# Patient Record
Sex: MALE | Race: WHITE | Employment: OTHER | URBAN - METROPOLITAN AREA
[De-identification: names, ages, dates, MRNs, and addresses within clinical notes are randomized per-mention and may not be internally consistent; named-entity substitution may affect disease eponyms.]

---

## 2018-01-26 ENCOUNTER — HOSPITAL ENCOUNTER (OUTPATIENT)
Dept: PHYSICAL THERAPY | Age: 73
Discharge: HOME | End: 2018-01-26
Payer: MEDICARE

## 2018-01-26 PROCEDURE — 97162 PT EVAL MOD COMPLEX 30 MIN: CPT

## 2018-01-26 PROCEDURE — G8979 MOBILITY GOAL STATUS: HCPCS

## 2018-01-26 PROCEDURE — 97110 THERAPEUTIC EXERCISES: CPT

## 2018-01-26 PROCEDURE — G8978 MOBILITY CURRENT STATUS: HCPCS

## 2018-01-26 NOTE — PROGRESS NOTES
Ambulatory/Rehab Services H2 Model Falls Risk Assessment    Risk Factor Pts. ·   Confusion/Disorientation/Impulsivity  []    4 ·   Symptomatic Depression  []   2 ·   Altered Elimination  []   1 ·   Dizziness/Vertigo  []   1 ·   Gender (Male)  [x]   1 ·   Any administered antiepileptics (anticonvulsants):  []   2 ·   Any administered benzodiazepines:  []   1 ·   Visual Impairment (specify):  []   1 ·   Portable Oxygen Use  []   1 ·   Orthostatic ? BP  []   1 ·   History of Recent Falls (within 3 mos.)  []   5     Ability to Rise from Chair (choose one) Pts. ·   Ability to rise in a single movement  []   0 ·   Pushes up, successful in one attempt  [x]   1 ·   Multiple attempts, but successful  []   3 ·   Unable to rise without assistance  []   4   Total: (5 or greater = High Risk) 2     Falls Prevention Plan:   []                Physical Limitations to Exercise (specify):   []                Mobility Assistance Device (type):   []                Exercise/Equipment Adaptation (specify):    ©2010 [facility] [address] Patent #[id number].  Federal Law prohibits the replication, distribution or use without written permission from [facility] [employer]

## 2018-01-26 NOTE — THERAPY EVALUATION
Mavis Capellan  : 1945  [place] / [place] / Product Type: Medicare /  [address]  at [place] [address] Therapy  [address], [address]  [phone]   [phone]      OUTPATIENT PHYSICAL THERAPY:Initial Assessment 2018    ICD-10: Treatment Diagnosis:  R26.2 Difficulty in walking, not elsewhere classified     M54.5 Low back pain   Precautions/Allergies:   Sulfa (sulfonamide antibiotics)   Fall Risk Score: 2 (? 5 = High Risk)  MD Orders: Evaluate and treat MEDICAL/REFERRING DIAGNOSIS:  Pain in right hip [M25.551]  Other intervertebral disc degeneration, lumbar region [M51.36]   DATE OF ONSET: 2017  REFERRING PHYSICIAN: Jack Walsh, *  RETURN PHYSICIAN APPOINTMENT: TBD     INITIAL ASSESSMENT:  Mr. Louis Zamora reported an onset of lumbar pain and R hip pain / weakness beginning 2017. He is an avid golfer and his symptoms are causing weight bearing activities to be increasingly difficult. Further evaluation revealed R hip abductor weakness which contributes to his R LE limp, and may be affecting his lumbar pain. He has been referred to physical therapy for treatment. PROBLEM LIST (Impacting functional limitations):  1. Decreased Strength  2. Decreased ADL/Functional Activities  3. Increased Pain  4. Decreased Activity Tolerance INTERVENTIONS PLANNED:  1. Home Exercise Program (HEP)  2. Manual Therapy  3. Neuromuscular Re-education/Strengthening  4. Therapeutic Exercise/Strengthening   TREATMENT PLAN:  Effective Dates: 2018 TO 3/26/2018. Frequency/Duration: 2 times a week for 8 weeks  GOALS: (Goals have been discussed and agreed upon with patient.)  SHORT-TERM FUNCTIONAL GOALS: Time Frame: 4 weeks  1. Increase LEFS 4 points to decrease pain 1-2 levels. 2. Increase R LE strength to decrease pain 1-2 levels. 3. Increase R LE strength to improve gait and decreased R LE limp with golf.   DISCHARGE GOALS: Time Frame: 12 weeks  1. Increase LEFS 9 points to decrease pain +2 levels. 2. Increase R LE strength to allow a normal gait pattern and no R LE limp with golf. .  3. Demonstrate independence in home exercises to allow DC from physical therapy. Rehabilitation Potential For Stated Goals: Good  Regarding Marquez Siddiqui's therapy, I certify that the treatment plan above will be carried out by a therapist or under their direction. Thank you for this referral,  Renan Caballero., PT     Referring Physician Signature: Néstor Farias., *              Date                    The information in this section was collected on 1/26/2018 (except where otherwise noted). HISTORY:   History of Present Injury/Illness (Reason for Referral): The patient reported an onset of lumbar pain and R hip pain / weakness beginning June 2017. He is an avid golfer and his symptoms are causing weight bearing activities to be increasingly difficult. Further evaluation revealed R hip abductor weakness which contributes to his R LE limp, and may be affecting his lumbar pain. He has been referred to physical therapy for treatment. Past Medical History/Comorbidities:   Mr. Jose D Merino  has a past medical history of Abdominal aneurysm without mention of rupture (9/30/2015); Benign localized hyperplasia of prostate without urinary obstruction and other lower urinary tract symptoms (LUTS) (9/30/2015); Bladder neck obstruction (9/30/2015); Cancer ([facility] [address] (02/2017); Gastrointestinal disorder; H/O testicular mass (9/30/2015); Hypertension; Inguinal hernia with obstruction, without mention of gangrene, recurrent unilateral or unspecified (9/30/2015); Other and unspecified hyperlipidemia (9/30/2015); Other ill-defined conditions; and Renal insufficiency (9/30/2015). Mr. Jose D Merino  has a past surgical history that includes hx other surgical; hx orthopaedic (Right); hx orthopaedic (Right); and hx other surgical (Right, 02/2017).   Social History/Living Environment:      Prior Level of Function/Work/Activity:  Retired  Dominant Side:         RIGHT  Current Medications:       Current Outpatient Prescriptions:     amLODIPine (NORVASC) 2.5 mg tablet, Take 7.5 mg by mouth daily. , Disp: , Rfl:     atorvastatin (LIPITOR) 40 mg tablet, Take  by mouth daily. 1-1/2 tablet daily, Disp: , Rfl:     finasteride (PROSCAR) 5 mg tablet, Take 5 mg by mouth daily. , Disp: , Rfl:     Aspirin, Buffered 81 mg tab, Take 1 Tab by mouth daily. Takes every other day, Disp: , Rfl:     tamsulosin (FLOMAX) 0.4 mg capsule, Take 0.4 mg by mouth daily. , Disp: , Rfl:     omeprazole (PRILOSEC) 10 mg capsule, Take 10 mg by mouth daily. , Disp: , Rfl:    Date Last Reviewed:  1/26/2018   Number of Personal Factors/Comorbidities that affect the Plan of Care: 1-2: MODERATE COMPLEXITY   EXAMINATION:     Observation/Orthostatic Postural Assessment:   Slightly kyphotic with a R LE limp. Palpation:   Tenderness to her lumbar spine and lateral right hip. ROM: AROM : WNL                          Strength:    -4/5 R LE Strength      5/5 L LE Strength           Special Tests: N/A  Neurological Screen: N/A  Functional Mobility: Independent   Balance:  Good. Body Structures Involved:  1. Joints  2. Muscles Body Functions Affected:  1. Sensory/Pain  2. Neuromusculoskeletal  3. Movement Related Activities and Participation Affected:  1. General Tasks and Demands  2. Mobility  3. Community, Social and [place]   Number of elements (examined above) that affect the Plan of Care: 3: MODERATE COMPLEXITY   CLINICAL PRESENTATION:   Presentation: Evolving clinical presentation with changing clinical characteristics: MODERATE COMPLEXITY   CLINICAL DECISION MAKING:   Outcome Measure:    Tool Used: Lower Extremity Functional Scale (LEFS)  Score:  Initial: 39/80 Most Recent: X/80 (Date: -- )   Interpretation of Score: 20 questions each scored on a 5 point scale with 0 representing \"extreme difficulty or unable to perform\" and 4 representing \"no difficulty\". The lower the score, the greater the functional disability. 80/80 represents no disability. Minimal detectable change is 9 points. Score 80 79-63 62-48 47-32 31-16 15-1 0   Modifier CH CI CJ CK CL CM CN     ? Mobility - Walking and Moving Around:     - CURRENT STATUS: CK - 40%-59% impaired, limited or restricted    - GOAL STATUS: CJ - 20%-39% impaired, limited or restricted    - D/C STATUS:  ---------------To be determined---------------    Medical Necessity:   · Patient demonstrates good rehab potential due to higher previous functional level. Reason for Services/Other Comments:  · Patient continues to require skilled intervention due to his inability to perform weight bearing activities and ADLs due to R LE pain and weakness. .   Use of outcome tool(s) and clinical judgement create a POC that gives a: Questionable prediction of patient's progress: MODERATE COMPLEXITY            TREATMENT:   (In addition to Assessment/Re-Assessment sessions the following treatments were rendered)  Pre-treatment Symptoms/Complaints:  The patient reported an onset of lumbar pain and R hip pain / weakness beginning June 2017. He is an avid golfer and his symptoms are causing weight bearing activities to be increasingly difficult. Further evaluation revealed R hip abductor weakness which contributes to his R LE limp, and may be affecting his lumbar pain. He has been referred to physical therapy for treatment. Pain: Initial:   Pain Intensity 1: 6  Post Session:  6   Therapeutic Exercise: ( 15): The patient received treatment as below to improve mobility, strength and balance. Required moderate verbal and manual cues to promote proper body alignment, promote proper body posture and promote proper body mechanics. Progressed resistance, range and repetitions as indicated.   The patient received exercise instruction followed by patient demonstration of the exercises to include AROM, PROM, and strengthening exercises for  step downs, gluteus medius, hip abduction, lateral plank, crab walk, bridging, single hip and knee extension in supine, and heel cord stretching to stabilize the patient's LE and improve AROM to decrease spasms, pain, and improve function. [facility]  Evaluation: ( X )  Manual Therapy (     ):   Therapeutic Modalities:                                                                                               HEP: As above; handouts given to patient for all exercises. Treatment/Session Assessment:    · Response to Treatment:  The patient tolerated today's treatment without symptom exacerbation. The patient demonstrated independence with the initial home exercises and is to perform the exercises in conjunction with continued physical therapy. The patient should progress to an independent home exercise program within a few weeks. · Compliance with Program/Exercises: Will assess as treatment progresses. · Recommendations/Intent for next treatment session: \"Next visit will focus on advancements to more challenging activities\".    Total Treatment Duration:  PT Patient Time In/Time Out  Time In: 0900  Time Out: [address] T[address] StreetPancho, PT

## 2018-01-30 ENCOUNTER — HOSPITAL ENCOUNTER (OUTPATIENT)
Dept: PHYSICAL THERAPY | Age: 73
Discharge: HOME | End: 2018-01-30
Payer: MEDICARE

## 2018-01-30 PROCEDURE — 97110 THERAPEUTIC EXERCISES: CPT

## 2018-02-02 ENCOUNTER — HOSPITAL ENCOUNTER (OUTPATIENT)
Dept: PHYSICAL THERAPY | Age: 73
Discharge: HOME | End: 2018-02-02
Payer: MEDICARE

## 2018-02-02 PROCEDURE — 97110 THERAPEUTIC EXERCISES: CPT

## 2018-02-02 NOTE — THERAPY EVALUATION
Mavis Capellan  : 1945  [place] / Product Type: Medicare /  [address]  at [facility]  [address], [address]  [phone]   [phone]      OUTPATIENT PHYSICAL THERAPY:Daily Note 2018    ICD-10: Treatment Diagnosis:  R26.2 Difficulty in walking, not elsewhere classified     M54.5 Low back pain   Precautions/Allergies:   Sulfa (sulfonamide antibiotics)   Fall Risk Score: 2 (? 5 = High Risk)  MD Orders: Evaluate and treat MEDICAL/REFERRING DIAGNOSIS:  Pain in right hip [M25.551]  Other intervertebral disc degeneration, lumbar region [M51.36]   DATE OF ONSET: 2017  REFERRING PHYSICIAN: Jack San., *  RETURN PHYSICIAN APPOINTMENT: TBD     INITIAL ASSESSMENT:  Mr. Louis Zamora reported an onset of lumbar pain and R hip pain / weakness beginning 2017. He is an avid golfer and his symptoms are causing weight bearing activities to be increasingly difficult. Further evaluation revealed R hip abductor weakness which contributes to his R LE limp, and may be affecting his lumbar pain. He has been referred to physical therapy for treatment. PROBLEM LIST (Impacting functional limitations):  1. Decreased Strength  2. Decreased ADL/Functional Activities  3. Increased Pain  4. Decreased Activity Tolerance INTERVENTIONS PLANNED:  1. Home Exercise Program (HEP)  2. Manual Therapy  3. Neuromuscular Re-education/Strengthening  4. Therapeutic Exercise/Strengthening   TREATMENT PLAN:  Effective Dates: 2018 TO 3/26/2018. Frequency/Duration: 2 times a week for 8 weeks  GOALS: (Goals have been discussed and agreed upon with patient.)  SHORT-TERM FUNCTIONAL GOALS: Time Frame: 4 weeks  1. Increase LEFS 4 points to decrease pain 1-2 levels. 2. Increase R LE strength to decrease pain 1-2 levels. 3. Increase R LE strength to improve gait and decreased R LE limp with golf.   DISCHARGE GOALS: Time Frame: 12 weeks  1. Increase LEFS 9 points to decrease pain +2 levels. 2. Increase R LE strength to allow a normal gait pattern and no R LE limp with golf. .  3. Demonstrate independence in home exercises to allow DC from physical therapy. Rehabilitation Potential For Stated Goals: Good  Regarding Marquez Siddiqui's therapy, I certify that the treatment plan above will be carried out by a therapist or under their direction. Thank you for this referral,  Keli Clement., PT                 The information in this section was collected on 1/26/2018 (except where otherwise noted). HISTORY:   History of Present Injury/Illness (Reason for Referral): The patient reported an onset of lumbar pain and R hip pain / weakness beginning June 2017. He is an avid golfer and his symptoms are causing weight bearing activities to be increasingly difficult. Further evaluation revealed R hip abductor weakness which contributes to his R LE limp, and may be affecting his lumbar pain. He has been referred to physical therapy for treatment. Past Medical History/Comorbidities:   Mr. Antonio Mead  has a past medical history of Abdominal aneurysm without mention of rupture (9/30/2015); Benign localized hyperplasia of prostate without urinary obstruction and other lower urinary tract symptoms (LUTS) (9/30/2015); Bladder neck obstruction (9/30/2015); Cancer ([facility] [address] (02/2017); Gastrointestinal disorder; H/O testicular mass (9/30/2015); Hypertension; Inguinal hernia with obstruction, without mention of gangrene, recurrent unilateral or unspecified (9/30/2015); Other and unspecified hyperlipidemia (9/30/2015); Other ill-defined conditions; and Renal insufficiency (9/30/2015). Mr. Antonio Mead  has a past surgical history that includes hx other surgical; hx orthopaedic (Right); hx orthopaedic (Right); and hx other surgical (Right, 02/2017).   Social History/Living Environment:      Prior Level of Function/Work/Activity:  Retired  Dominant Side: RIGHT  Current Medications:       Current Outpatient Prescriptions:     amLODIPine (NORVASC) 2.5 mg tablet, Take 7.5 mg by mouth daily. , Disp: , Rfl:     atorvastatin (LIPITOR) 40 mg tablet, Take  by mouth daily. 1-1/2 tablet daily, Disp: , Rfl:     finasteride (PROSCAR) 5 mg tablet, Take 5 mg by mouth daily. , Disp: , Rfl:     Aspirin, Buffered 81 mg tab, Take 1 Tab by mouth daily. Takes every other day, Disp: , Rfl:     tamsulosin (FLOMAX) 0.4 mg capsule, Take 0.4 mg by mouth daily. , Disp: , Rfl:     omeprazole (PRILOSEC) 10 mg capsule, Take 10 mg by mouth daily. , Disp: , Rfl:    Date Last Reviewed:  2/2/2018   Number of Personal Factors/Comorbidities that affect the Plan of Care: 1-2: MODERATE COMPLEXITY   EXAMINATION:     Observation/Orthostatic Postural Assessment:   Slightly kyphotic with a R LE limp. Palpation:   Tenderness to her lumbar spine and lateral right hip. ROM: AROM : WNL                          Strength:    -4/5 R LE Strength      5/5 L LE Strength           Special Tests: N/A  Neurological Screen: N/A  Functional Mobility: Independent   Balance:  Good. Body Structures Involved:  1. Joints  2. Muscles Body Functions Affected:  1. Sensory/Pain  2. Neuromusculoskeletal  3. Movement Related Activities and Participation Affected:  1. General Tasks and Demands  2. Mobility  3. Community, Social and [place]   Number of elements (examined above) that affect the Plan of Care: 3: MODERATE COMPLEXITY   CLINICAL PRESENTATION:   Presentation: Evolving clinical presentation with changing clinical characteristics: MODERATE COMPLEXITY   CLINICAL DECISION MAKING:   Outcome Measure: Tool Used: Lower Extremity Functional Scale (LEFS)  Score:  Initial: 39/80 Most Recent: X/80 (Date: -- )   Interpretation of Score: 20 questions each scored on a 5 point scale with 0 representing \"extreme difficulty or unable to perform\" and 4 representing \"no difficulty\".   The lower the score, the greater the functional disability. 80/80 represents no disability. Minimal detectable change is 9 points. Score 80 79-63 62-48 47-32 31-16 15-1 0   Modifier CH CI CJ CK CL CM CN     ? Mobility - Walking and Moving Around:     - CURRENT STATUS: CK - 40%-59% impaired, limited or restricted    - GOAL STATUS: CJ - 20%-39% impaired, limited or restricted    - D/C STATUS:  ---------------To be determined---------------    Medical Necessity:   · Patient demonstrates good rehab potential due to higher previous functional level. Reason for Services/Other Comments:  · Patient continues to require skilled intervention due to his inability to perform weight bearing activities and ADLs due to R LE pain and weakness. .   Use of outcome tool(s) and clinical judgement create a POC that gives a: Questionable prediction of patient's progress: MODERATE COMPLEXITY            TREATMENT:   (In addition to Assessment/Re-Assessment sessions the following treatments were rendered)  Pre-treatment Symptoms/Complaints:  The patient did not report any new symptoms today. Pain: Initial:   Pain Intensity 1: 5  Post Session:  5   Therapeutic Exercise: ( 45): The patient received treatment as below to improve mobility, strength and balance. Required moderate verbal and manual cues to promote proper body alignment, promote proper body posture and promote proper body mechanics. Progressed resistance, range and repetitions as indicated. The patient received exercise instruction followed by patient demonstration of the exercises to include AROM, PROM, and strengthening exercises for  step downs, gluteus medius, hip abduction, lateral plank, crab walk, bridging, single hip and knee extension in supine, and heel cord stretching to stabilize the patient's LE and improve AROM to decrease spasms, pain, and improve function.    Date:  1/30/2018 Date:  2/2/2018 Date:     Activity/Exercise Parameters Parameters Parameters   MAT  EXERCISES:      Hip ER / Abduction      Hip Adduction (Ball Squeeze)      Bridging      Straight Leg Raise      SAQ      Hip Abduction (Sidelying)      Gluteus Medius (Crab Walk) 3 x 10(B) 10 x 5(B)    Sit To Stand            STANDING HIP MACHINE:      Hip Abduction [email or web address] x 15(B) [email or web address] x 15(B)    Hip Flexion [email or web address] x 15(B) [email or web address] x 15(B)    Hip Extension [email or web address] x 15(B) [email or web address] x 15(B)    Hip Adduction      Standing Knee Extension      4 Way Cable [email or web address] [email or web address] x 15 [email or web address] x 15    Gluteus Medius (Crab Walk)      Knee Extension [email or web address] x 15(B) [email or web address] x 15(B)    Knee Flexion [email or web address] x 15(B) [email or web address] x 15(B)    Step Downs 2 x 15(B) 2 x 15(B)          Stationary Bike      Treadmill      NuStep 10 min. 10 min          FLEXIBILITY:      Heel Cord      Hamstring      Hip Flexors              [employer] Portal  Evaluation: (  )  Manual Therapy (     ):   Therapeutic Modalities:                                                                                               HEP: As above; handouts given to patient for all exercises. Treatment/Session Assessment:    · Response to Treatment:  The patient tolerated today's treatment without symptom exacerbation. The patient demonstrated exercise tolerance to the exercises with increased weights and requiring manual and verbal cuing. The patient should progress to an independent home exercise program within a few weeks. · Compliance with Program/Exercises: Will assess as treatment progresses. · Recommendations/Intent for next treatment session: \"Next visit will focus on advancements to more challenging activities\".    Total Treatment Duration:  PT Patient Time In/Time Out  Time In: 0900  Time Out: Danna Sanchez., PT

## 2018-02-05 ENCOUNTER — HOSPITAL ENCOUNTER (OUTPATIENT)
Dept: PHYSICAL THERAPY | Age: 73
Discharge: HOME | End: 2018-02-05
Payer: MEDICARE

## 2018-02-05 PROCEDURE — 97110 THERAPEUTIC EXERCISES: CPT

## 2018-02-06 NOTE — THERAPY EVALUATION
Romie Alvarez  : 1945  [place] / Product Type: Medicare /  [address] [place]  at [facility]  [address], [address]  [phone]   [phone]      OUTPATIENT PHYSICAL THERAPY:Daily Note 2018    ICD-10: Treatment Diagnosis:  R26.2 Difficulty in walking, not elsewhere classified     M54.5 Low back pain   Precautions/Allergies:   Sulfa (sulfonamide antibiotics)   Fall Risk Score: 2 (? 5 = High Risk)  MD Orders: Evaluate and treat MEDICAL/REFERRING DIAGNOSIS:  Pain in right hip [M25.551]  Other intervertebral disc degeneration, lumbar region [M51.36]   DATE OF ONSET: 2017  REFERRING PHYSICIAN: Ethan Rothman., *  RETURN PHYSICIAN APPOINTMENT: TBD     INITIAL ASSESSMENT:  Mr. Anastacia Rodriguez reported an onset of lumbar pain and R hip pain / weakness beginning 2017. He is an avid golfer and his symptoms are causing weight bearing activities to be increasingly difficult. Further evaluation revealed R hip abductor weakness which contributes to his R LE limp, and may be affecting his lumbar pain. He has been referred to physical therapy for treatment. PROBLEM LIST (Impacting functional limitations):  1. Decreased Strength  2. Decreased ADL/Functional Activities  3. Increased Pain  4. Decreased Activity Tolerance INTERVENTIONS PLANNED:  1. Home Exercise Program (HEP)  2. Manual Therapy  3. Neuromuscular Re-education/Strengthening  4. Therapeutic Exercise/Strengthening   TREATMENT PLAN:  Effective Dates: 2018 TO 3/26/2018. Frequency/Duration: 2 times a week for 8 weeks  GOALS: (Goals have been discussed and agreed upon with patient.)  SHORT-TERM FUNCTIONAL GOALS: Time Frame: 4 weeks  1. Increase LEFS 4 points to decrease pain 1-2 levels. 2. Increase R LE strength to decrease pain 1-2 levels. 3. Increase R LE strength to improve gait and decreased R LE limp with golf.   DISCHARGE GOALS: Time Frame: 12 weeks  1. Increase LEFS 9 points to decrease pain +2 levels. 2. Increase R LE strength to allow a normal gait pattern and no R LE limp with golf. .  3. Demonstrate independence in home exercises to allow DC from physical therapy. Rehabilitation Potential For Stated Goals: Good  Regarding Marquez Siddiqui's therapy, I certify that the treatment plan above will be carried out by a therapist or under their direction. Thank you for this referral,  Richi Feliz, PT                 The information in this section was collected on 1/26/2018 (except where otherwise noted). HISTORY:   History of Present Injury/Illness (Reason for Referral): The patient reported an onset of lumbar pain and R hip pain / weakness beginning June 2017. He is an avid golfer and his symptoms are causing weight bearing activities to be increasingly difficult. Further evaluation revealed R hip abductor weakness which contributes to his R LE limp, and may be affecting his lumbar pain. He has been referred to physical therapy for treatment. Past Medical History/Comorbidities:   Mr. Caity Guzman  has a past medical history of Abdominal aneurysm without mention of rupture (9/30/2015); Benign localized hyperplasia of prostate without urinary obstruction and other lower urinary tract symptoms (LUTS) (9/30/2015); Bladder neck obstruction (9/30/2015); Cancer ([facility] [address] (02/2017); Gastrointestinal disorder; H/O testicular mass (9/30/2015); Hypertension; Inguinal hernia with obstruction, without mention of gangrene, recurrent unilateral or unspecified (9/30/2015); Other and unspecified hyperlipidemia (9/30/2015); Other ill-defined conditions; and Renal insufficiency (9/30/2015). Mr. Caity Guzman  has a past surgical history that includes hx other surgical; hx orthopaedic (Right); hx orthopaedic (Right); and hx other surgical (Right, 02/2017).   Social History/Living Environment:      Prior Level of Function/Work/Activity:  Retired  Dominant Side: RIGHT  Current Medications:       Current Outpatient Prescriptions:     amLODIPine (NORVASC) 2.5 mg tablet, Take 7.5 mg by mouth daily. , Disp: , Rfl:     atorvastatin (LIPITOR) 40 mg tablet, Take  by mouth daily. 1-1/2 tablet daily, Disp: , Rfl:     finasteride (PROSCAR) 5 mg tablet, Take 5 mg by mouth daily. , Disp: , Rfl:     Aspirin, Buffered 81 mg tab, Take 1 Tab by mouth daily. Takes every other day, Disp: , Rfl:     tamsulosin (FLOMAX) 0.4 mg capsule, Take 0.4 mg by mouth daily. , Disp: , Rfl:     omeprazole (PRILOSEC) 10 mg capsule, Take 10 mg by mouth daily. , Disp: , Rfl:    Date Last Reviewed:  2/5/2018   Number of Personal Factors/Comorbidities that affect the Plan of Care: 1-2: MODERATE COMPLEXITY   EXAMINATION:     Observation/Orthostatic Postural Assessment:   Slightly kyphotic with a R LE limp. Palpation:   Tenderness to her lumbar spine and lateral right hip. ROM: AROM : WNL                          Strength:    -4/5 R LE Strength      5/5 L LE Strength           Special Tests: N/A  Neurological Screen: N/A  Functional Mobility: Independent   Balance:  Good. Body Structures Involved:  1. Joints  2. Muscles Body Functions Affected:  1. Sensory/Pain  2. Neuromusculoskeletal  3. Movement Related Activities and Participation Affected:  1. General Tasks and Demands  2. Mobility  3. Community, Social and [place]   Number of elements (examined above) that affect the Plan of Care: 3: MODERATE COMPLEXITY   CLINICAL PRESENTATION:   Presentation: Evolving clinical presentation with changing clinical characteristics: MODERATE COMPLEXITY   CLINICAL DECISION MAKING:   Outcome Measure: Tool Used: Lower Extremity Functional Scale (LEFS)  Score:  Initial: 39/80 Most Recent: X/80 (Date: -- )   Interpretation of Score: 20 questions each scored on a 5 point scale with 0 representing \"extreme difficulty or unable to perform\" and 4 representing \"no difficulty\".   The lower the score, the greater the functional disability. 80/80 represents no disability. Minimal detectable change is 9 points. Score 80 79-63 62-48 47-32 31-16 15-1 0   Modifier CH CI CJ CK CL CM CN     ? Mobility - Walking and Moving Around:     - CURRENT STATUS: CK - 40%-59% impaired, limited or restricted    - GOAL STATUS: CJ - 20%-39% impaired, limited or restricted    - D/C STATUS:  ---------------To be determined---------------    Medical Necessity:   · Patient demonstrates good rehab potential due to higher previous functional level. Reason for Services/Other Comments:  · Patient continues to require skilled intervention due to his inability to perform weight bearing activities and ADLs due to R LE pain and weakness. .   Use of outcome tool(s) and clinical judgement create a POC that gives a: Questionable prediction of patient's progress: MODERATE COMPLEXITY            TREATMENT:   (In addition to Assessment/Re-Assessment sessions the following treatments were rendered)  Pre-treatment Symptoms/Complaints:  The patient feels his strength is improving. Pain: Initial:   Pain Intensity 1: 4  Post Session:  4   Therapeutic Exercise: ( 45): The patient received treatment as below to improve mobility, strength and balance. Required moderate verbal and manual cues to promote proper body alignment, promote proper body posture and promote proper body mechanics. Progressed resistance, range and repetitions as indicated. The patient received exercise instruction followed by patient demonstration of the exercises to include AROM, PROM, and strengthening exercises for  step downs, gluteus medius, hip abduction, lateral plank, crab walk, bridging, single hip and knee extension in supine, and heel cord stretching to stabilize the patient's LE and improve AROM to decrease spasms, pain, and improve function.    Date:  1/30/2018 Date:  2/2/2018 Date:  2/5/2018   Activity/Exercise Parameters Parameters Parameters   MAT  EXERCISES:      Hip ER / Abduction      Hip Adduction (Ball Squeeze)      Bridging      Straight Leg Raise      SAQ      Hip Abduction (Sidelying)      Gluteus Medius (Crab Walk) 3 x 10(B) 10 x 5(B) 10 x 5(B)   Sit To Stand            STANDING HIP MACHINE:      Hip Abduction [email or web address] x 15(B) [email or web address] x 15(B) [email or web address] x 15(B)   Hip Flexion [email or web address] x 15(B) [email or web address] x 15(B) [email or web address] x 15(B)   Hip Extension [email or web address] x 15(B) [email or web address] x 15(B) [email or web address] x 15(B)   Hip Adduction      Standing Knee Extension      4 Way Cable Walkout [email or web address] x 15 [email or web address] x 15 [email or web address] x 15   Gluteus Medius (Crab Walk)      Knee Extension [email or web address] x 15(B) [email or web address] x 15(B) [email or web address] x 15(B)   Knee Flexion [email or web address] x 15(B) [email or web address] x 15(B) [email or web address] x 15(B)   Step Downs 2 x 15(B) 2 x 15(B) 3 x 15(B)         Stationary Bike   10 min   Treadmill      NuStep 10 min. 10 min          FLEXIBILITY:      Heel Cord      Hamstring      Hip Flexors              [employer] Portal  Evaluation: (  )  Manual Therapy (     ):   Therapeutic Modalities:                                                                                               HEP: As above; handouts given to patient for all exercises. Treatment/Session Assessment:    · Response to Treatment:  The patient tolerated today's treatment without symptom exacerbation. The patient demonstrated exercise tolerance to the exercises with increased reps and requiring manual and verbal cuing. The patient should progress to an independent home exercise program within a few weeks. · Compliance with Program/Exercises: Will assess as treatment progresses. · Recommendations/Intent for next treatment session: \"Next visit will focus on advancements to more challenging activities\".    Total Treatment Duration:  PT Patient Time In/Time Out  Time In: 1600  Time Out: [id number] PT

## 2018-02-13 ENCOUNTER — HOSPITAL ENCOUNTER (OUTPATIENT)
Dept: PHYSICAL THERAPY | Age: 73
Discharge: HOME | End: 2018-02-13
Payer: MEDICARE

## 2018-02-13 PROCEDURE — 97110 THERAPEUTIC EXERCISES: CPT

## 2018-02-13 NOTE — THERAPY EVALUATION
Francois Cotter  : 1945  [place] / Product Type: Medicare /  [address] [place]  at [facility]  [address], [address]  [phone]   [phone]      OUTPATIENT PHYSICAL THERAPY:Daily Note 2018    ICD-10: Treatment Diagnosis:  R26.2 Difficulty in walking, not elsewhere classified     M54.5 Low back pain   Precautions/Allergies:   Sulfa (sulfonamide antibiotics)   Fall Risk Score: 2 (? 5 = High Risk)  MD Orders: Evaluate and treat MEDICAL/REFERRING DIAGNOSIS:  Pain in right hip [M25.551]  Other intervertebral disc degeneration, lumbar region [M51.36]   DATE OF ONSET: 2017  REFERRING PHYSICIAN: Jeff Cramer., *  RETURN PHYSICIAN APPOINTMENT: TBD     INITIAL ASSESSMENT:  Mr. Janet Blanc reported an onset of lumbar pain and R hip pain / weakness beginning 2017. He is an avid golfer and his symptoms are causing weight bearing activities to be increasingly difficult. Further evaluation revealed R hip abductor weakness which contributes to his R LE limp, and may be affecting his lumbar pain. He has been referred to physical therapy for treatment. PROBLEM LIST (Impacting functional limitations):  1. Decreased Strength  2. Decreased ADL/Functional Activities  3. Increased Pain  4. Decreased Activity Tolerance INTERVENTIONS PLANNED:  1. Home Exercise Program (HEP)  2. Manual Therapy  3. Neuromuscular Re-education/Strengthening  4. Therapeutic Exercise/Strengthening   TREATMENT PLAN:  Effective Dates: 2018 TO 3/26/2018. Frequency/Duration: 2 times a week for 8 weeks  GOALS: (Goals have been discussed and agreed upon with patient.)  SHORT-TERM FUNCTIONAL GOALS: Time Frame: 4 weeks  1. Increase LEFS 4 points to decrease pain 1-2 levels. 2. Increase R LE strength to decrease pain 1-2 levels. 3. Increase R LE strength to improve gait and decreased R LE limp with golf.   DISCHARGE GOALS: Time Frame: 12 weeks  1. Increase LEFS 9 points to decrease pain +2 levels. 2. Increase R LE strength to allow a normal gait pattern and no R LE limp with golf. .  3. Demonstrate independence in home exercises to allow DC from physical therapy. Rehabilitation Potential For Stated Goals: Good  Regarding Marquez Siddiqui's therapy, I certify that the treatment plan above will be carried out by a therapist or under their direction. Thank you for this referral,  Mary Deng., PT                 The information in this section was collected on 1/26/2018 (except where otherwise noted). HISTORY:   History of Present Injury/Illness (Reason for Referral): The patient reported an onset of lumbar pain and R hip pain / weakness beginning June 2017. He is an avid golfer and his symptoms are causing weight bearing activities to be increasingly difficult. Further evaluation revealed R hip abductor weakness which contributes to his R LE limp, and may be affecting his lumbar pain. He has been referred to physical therapy for treatment. Past Medical History/Comorbidities:   Mr. Randy Da Silva  has a past medical history of Abdominal aneurysm without mention of rupture (9/30/2015); Benign localized hyperplasia of prostate without urinary obstruction and other lower urinary tract symptoms (LUTS) (9/30/2015); Bladder neck obstruction (9/30/2015); Cancer ([facility] [address] (02/2017); Gastrointestinal disorder; H/O testicular mass (9/30/2015); Hypertension; Inguinal hernia with obstruction, without mention of gangrene, recurrent unilateral or unspecified (9/30/2015); Other and unspecified hyperlipidemia (9/30/2015); Other ill-defined conditions; and Renal insufficiency (9/30/2015). Mr. Randy Da Silva  has a past surgical history that includes hx other surgical; hx orthopaedic (Right); hx orthopaedic (Right); and hx other surgical (Right, 02/2017).   Social History/Living Environment:      Prior Level of Function/Work/Activity:  Retired  Dominant Side:         RIGHT  Current Medications:       Current Outpatient Prescriptions:     amLODIPine (NORVASC) 2.5 mg tablet, Take 7.5 mg by mouth daily. , Disp: , Rfl:     atorvastatin (LIPITOR) 40 mg tablet, Take  by mouth daily. 1-1/2 tablet daily, Disp: , Rfl:     finasteride (PROSCAR) 5 mg tablet, Take 5 mg by mouth daily. , Disp: , Rfl:     Aspirin, Buffered 81 mg tab, Take 1 Tab by mouth daily. Takes every other day, Disp: , Rfl:     tamsulosin (FLOMAX) 0.4 mg capsule, Take 0.4 mg by mouth daily. , Disp: , Rfl:     omeprazole (PRILOSEC) 10 mg capsule, Take 10 mg by mouth daily. , Disp: , Rfl:    Date Last Reviewed:  2/13/2018   Number of Personal Factors/Comorbidities that affect the Plan of Care: 1-2: MODERATE COMPLEXITY   EXAMINATION:     Observation/Orthostatic Postural Assessment:   Slightly kyphotic with a R LE limp. Palpation:   Tenderness to her lumbar spine and lateral right hip. ROM: AROM : WNL                          Strength:    -4/5 R LE Strength      5/5 L LE Strength           Special Tests: N/A  Neurological Screen: N/A  Functional Mobility: Independent   Balance:  Good. Body Structures Involved:  1. Joints  2. Muscles Body Functions Affected:  1. Sensory/Pain  2. Neuromusculoskeletal  3. Movement Related Activities and Participation Affected:  1. General Tasks and Demands  2. Mobility  3. Community, Social and [place]   Number of elements (examined above) that affect the Plan of Care: 3: MODERATE COMPLEXITY   CLINICAL PRESENTATION:   Presentation: Evolving clinical presentation with changing clinical characteristics: MODERATE COMPLEXITY   CLINICAL DECISION MAKING:   Outcome Measure: Tool Used: Lower Extremity Functional Scale (LEFS)  Score:  Initial: 39/80 Most Recent: X/80 (Date: -- )   Interpretation of Score: 20 questions each scored on a 5 point scale with 0 representing \"extreme difficulty or unable to perform\" and 4 representing \"no difficulty\".   The lower the score, the greater the functional disability. 80/80 represents no disability. Minimal detectable change is 9 points. Score 80 79-63 62-48 47-32 31-16 15-1 0   Modifier CH CI CJ CK CL CM CN     ? Mobility - Walking and Moving Around:     - CURRENT STATUS: CK - 40%-59% impaired, limited or restricted    - GOAL STATUS: CJ - 20%-39% impaired, limited or restricted    - D/C STATUS:  ---------------To be determined---------------    Medical Necessity:   · Patient demonstrates good rehab potential due to higher previous functional level. Reason for Services/Other Comments:  · Patient continues to require skilled intervention due to his inability to perform weight bearing activities and ADLs due to R LE pain and weakness. .   Use of outcome tool(s) and clinical judgement create a POC that gives a: Questionable prediction of patient's progress: MODERATE COMPLEXITY            TREATMENT:   (In addition to Assessment/Re-Assessment sessions the following treatments were rendered)  Pre-treatment Symptoms/Complaints:  The patient has returned to therapy following doctor visits. Pain: Initial:   Pain Intensity 1: 4  Post Session:  4   Therapeutic Exercise: ( 60): The patient received treatment as below to improve mobility, strength and balance. Required moderate verbal and manual cues to promote proper body alignment, promote proper body posture and promote proper body mechanics. Progressed resistance, range and repetitions as indicated. The patient received exercise instruction followed by patient demonstration of the exercises to include AROM, PROM, and strengthening exercises for  step downs, gluteus medius, hip abduction, lateral plank, crab walk, bridging, single hip and knee extension in supine, and heel cord stretching to stabilize the patient's LE and improve AROM to decrease spasms, pain, and improve function.    Date:  1/30/2018 Date:  2/2/2018 Date:  2/5/2018 Date:  2/13/2018   Date:     Activity/Exercise Parameters Parameters     Parameters   MAT  EXERCISES:          Hip ER / Abduction          Hip Adduction (Ball Squeeze)          Bridging          Straight Leg Raise          SAQ          Hip Abduction (Sidelying)          Gluteus Medius (Crab Walk) 3 x 10(B) 10 x 5(B) 10 x 5(B) 10 x 5(B)      Sit To Stand                    STANDING HIP MACHINE:          Hip Abduction [email or web address] x 15(B) [email or web address] x 15(B) [email or web address] x 15(B) [email or web address] x 15(B)      Hip Flexion [email or web address] x 15(B) [email or web address] x 15(B) [email or web address] x 15(B) [email or web address] x 15(B)      Hip Extension [email or web address] x 15(B) [email or web address] x 15(B) [email or web address] x 15(B) [email or web address] x 15(B)      Hip Adduction          Standing Knee Extension    [email or web address] x 15(B)      4 Way Cable Walkout [email or web address] x 15 [email or web address] x 15 [email or web address] x 15(B)       Gluteus Medius (Crab Walk)          Knee Extension [email or web address] x 15(B) [email or web address] x 15(B) [email or web address] x 15(B) [email or web address] x 15(B)      Knee Flexion [email or web address] x 15(B) [email or web address] x 15(B) [email or web address] x 15(B) [email or web address] x 15(B)      Step Downs 2 x 15(B) 2 x 15(B) 3 x 15(B) @3 x 15(B)                Stationary Bike   10 min 10 min      Treadmill          NuStep 10 min. 10 min                  FLEXIBILITY:          Heel Cord          Hamstring          Hip Flexors                      [facility]  Evaluation: (  )  Manual Therapy (     ):   Therapeutic Modalities:                                                                                               HEP: As above; handouts given to patient for all exercises. Treatment/Session Assessment:    · Response to Treatment:  The patient tolerated today's treatment without symptom exacerbation. The patient demonstrated exercise tolerance following a brief brake from PT, but has continued his HEP. He required manual and verbal cuing. The patient should progress to an independent home exercise program within a few weeks. · Compliance with Program/Exercises: Will assess as treatment progresses. · Recommendations/Intent for next treatment session: \"Next visit will focus on advancements to more challenging activities\".    Total Treatment Duration:  PT Patient Time In/Time Out  Time In: 1000  Time Out: [address] [facility]., PT

## 2018-02-21 ENCOUNTER — HOSPITAL ENCOUNTER (OUTPATIENT)
Dept: PHYSICAL THERAPY | Age: 73
Discharge: HOME | End: 2018-02-21
Payer: MEDICARE

## 2018-02-21 PROCEDURE — 97110 THERAPEUTIC EXERCISES: CPT

## 2018-02-21 NOTE — THERAPY EVALUATION
Sesar Guaman  : 1945  [place] / Product Type: Medicare /  [address]  at [facility]  [address], [address]  [phone]   [phone]      OUTPATIENT PHYSICAL THERAPY:Daily Note 2018    ICD-10: Treatment Diagnosis:  R26.2 Difficulty in walking, not elsewhere classified     M54.5 Low back pain   Precautions/Allergies:   Sulfa (sulfonamide antibiotics)   Fall Risk Score: 2 (? 5 = High Risk)  MD Orders: Evaluate and treat MEDICAL/REFERRING DIAGNOSIS:  Pain in right hip [M25.551]  Other intervertebral disc degeneration, lumbar region [M51.36]   DATE OF ONSET: 2017  REFERRING PHYSICIAN: Bruna Pete., *  RETURN PHYSICIAN APPOINTMENT: TBD     INITIAL ASSESSMENT:  Mr. Gaviota Bojorquez reported an onset of lumbar pain and R hip pain / weakness beginning 2017. He is an avid golfer and his symptoms are causing weight bearing activities to be increasingly difficult. Further evaluation revealed R hip abductor weakness which contributes to his R LE limp, and may be affecting his lumbar pain. He has been referred to physical therapy for treatment. PROBLEM LIST (Impacting functional limitations):  1. Decreased Strength  2. Decreased ADL/Functional Activities  3. Increased Pain  4. Decreased Activity Tolerance INTERVENTIONS PLANNED:  1. Home Exercise Program (HEP)  2. Manual Therapy  3. Neuromuscular Re-education/Strengthening  4. Therapeutic Exercise/Strengthening   TREATMENT PLAN:  Effective Dates: 2018 TO 3/26/2018. Frequency/Duration: 2 times a week for 8 weeks  GOALS: (Goals have been discussed and agreed upon with patient.)  SHORT-TERM FUNCTIONAL GOALS: Time Frame: 4 weeks  1. Increase LEFS 4 points to decrease pain 1-2 levels. 2. Increase R LE strength to decrease pain 1-2 levels. 3. Increase R LE strength to improve gait and decreased R LE limp with golf.   DISCHARGE GOALS: Time Frame: 12 weeks  1. Increase LEFS 9 points to decrease pain +2 levels. 2. Increase R LE strength to allow a normal gait pattern and no R LE limp with golf. .  3. Demonstrate independence in home exercises to allow DC from physical therapy. Rehabilitation Potential For Stated Goals: Good  Regarding Marquez Siddiqui's therapy, I certify that the treatment plan above will be carried out by a therapist or under their direction. Thank you for this referral,  Jenny Garcia, PT                 The information in this section was collected on 1/26/2018 (except where otherwise noted). HISTORY:   History of Present Injury/Illness (Reason for Referral): The patient reported an onset of lumbar pain and R hip pain / weakness beginning June 2017. He is an avid golfer and his symptoms are causing weight bearing activities to be increasingly difficult. Further evaluation revealed R hip abductor weakness which contributes to his R LE limp, and may be affecting his lumbar pain. He has been referred to physical therapy for treatment. Past Medical History/Comorbidities:   Mr. Gee Mcconnell  has a past medical history of Abdominal aneurysm without mention of rupture (9/30/2015); Benign localized hyperplasia of prostate without urinary obstruction and other lower urinary tract symptoms (LUTS) (9/30/2015); Bladder neck obstruction (9/30/2015); Cancer ([facility] [address] (02/2017); Gastrointestinal disorder; H/O testicular mass (9/30/2015); Hypertension; Inguinal hernia with obstruction, without mention of gangrene, recurrent unilateral or unspecified (9/30/2015); Other and unspecified hyperlipidemia (9/30/2015); Other ill-defined conditions; and Renal insufficiency (9/30/2015). Mr. Gee Mcconnell  has a past surgical history that includes hx other surgical; hx orthopaedic (Right); hx orthopaedic (Right); and hx other surgical (Right, 02/2017).   Social History/Living Environment:      Prior Level of Function/Work/Activity:  Retired  Dominant Side:         RIGHT  Current Medications:       Current Outpatient Prescriptions:     amLODIPine (NORVASC) 2.5 mg tablet, Take 7.5 mg by mouth daily. , Disp: , Rfl:     atorvastatin (LIPITOR) 40 mg tablet, Take  by mouth daily. 1-1/2 tablet daily, Disp: , Rfl:     finasteride (PROSCAR) 5 mg tablet, Take 5 mg by mouth daily. , Disp: , Rfl:     Aspirin, Buffered 81 mg tab, Take 1 Tab by mouth daily. Takes every other day, Disp: , Rfl:     tamsulosin (FLOMAX) 0.4 mg capsule, Take 0.4 mg by mouth daily. , Disp: , Rfl:     omeprazole (PRILOSEC) 10 mg capsule, Take 10 mg by mouth daily. , Disp: , Rfl:    Date Last Reviewed:  2/21/2018   Number of Personal Factors/Comorbidities that affect the Plan of Care: 1-2: MODERATE COMPLEXITY   EXAMINATION:     Observation/Orthostatic Postural Assessment:   Slightly kyphotic with a R LE limp. Palpation:   Tenderness to her lumbar spine and lateral right hip. ROM: AROM : WNL                          Strength:    -4/5 R LE Strength      5/5 L LE Strength           Special Tests: N/A  Neurological Screen: N/A  Functional Mobility: Independent   Balance:  Good. Body Structures Involved:  1. Joints  2. Muscles Body Functions Affected:  1. Sensory/Pain  2. Neuromusculoskeletal  3. Movement Related Activities and Participation Affected:  1. General Tasks and Demands  2. Mobility  3. Community, Social and [place]   Number of elements (examined above) that affect the Plan of Care: 3: MODERATE COMPLEXITY   CLINICAL PRESENTATION:   Presentation: Evolving clinical presentation with changing clinical characteristics: MODERATE COMPLEXITY   CLINICAL DECISION MAKING:   Outcome Measure: Tool Used: Lower Extremity Functional Scale (LEFS)  Score:  Initial: 39/80 Most Recent: X/80 (Date: -- )   Interpretation of Score: 20 questions each scored on a 5 point scale with 0 representing \"extreme difficulty or unable to perform\" and 4 representing \"no difficulty\".   The lower the score, the greater the functional disability. 80/80 represents no disability. Minimal detectable change is 9 points. Score 80 79-63 62-48 47-32 31-16 15-1 0   Modifier CH CI CJ CK CL CM CN     ? Mobility - Walking and Moving Around:     - CURRENT STATUS: CK - 40%-59% impaired, limited or restricted    - GOAL STATUS: CJ - 20%-39% impaired, limited or restricted    - D/C STATUS:  ---------------To be determined---------------    Medical Necessity:   · Patient demonstrates good rehab potential due to higher previous functional level. Reason for Services/Other Comments:  · Patient continues to require skilled intervention due to his inability to perform weight bearing activities and ADLs due to R LE pain and weakness. .   Use of outcome tool(s) and clinical judgement create a POC that gives a: Questionable prediction of patient's progress: MODERATE COMPLEXITY            TREATMENT:   (In addition to Assessment/Re-Assessment sessions the following treatments were rendered)  Pre-treatment Symptoms/Complaints:  The patient has returned to therapy following physician visits. Pain: Initial:   Pain Intensity 1: 4  Post Session:  4   Therapeutic Exercise: ( 60): The patient received treatment as below to improve mobility, strength and balance. Required moderate verbal and manual cues to promote proper body alignment, promote proper body posture and promote proper body mechanics. Progressed resistance, range and repetitions as indicated. The patient received exercise instruction followed by patient demonstration of the exercises to include AROM, PROM, and strengthening exercises for  step downs, gluteus medius, hip abduction, lateral plank, crab walk, bridging, single hip and knee extension in supine, and heel cord stretching to stabilize the patient's LE and improve AROM to decrease spasms, pain, and improve function.    Date:  1/30/2018 Date:  2/2/2018 Date:  2/5/2018 Date:  2/13/2018 Date:  2/21/2018  Date: Activity/Exercise Parameters Parameters     Parameters   MAT  EXERCISES:          Hip ER / Abduction          Hip Adduction (Ball Squeeze)          Bridging          Straight Leg Raise          SAQ          Hip Abduction (Sidelying)          Gluteus Medius (Crab Walk) 3 x 10(B) 10 x 5(B) 10 x 5(B) 10 x 5(B) 10 x 5(B)     Sit To Stand                    STANDING HIP MACHINE:          Hip Abduction [email or web address] x 15(B) [email or web address] x 15(B) [email or web address] x 15(B) [email or web address] x 15(B) [email or web address] x 15(B)     Hip Flexion [email or web address] x 15(B) [email or web address] x 15(B) [email or web address] x 15(B) [email or web address] x 15(B) [email or web address] x 15(B)     Hip Extension [email or web address]@[email or web address] x 15(B) [email or web address] x 15(B) [email or web address] x 15(B) [email or web address] x 15(B) [email or web address] x 15(B)     Hip Adduction          Standing Knee Extension    [email or web address] x 15(B) [email or web address] x 15(B)     4 Way Cable Walkout [email or web address] x 15 [email or web address] x 15 [email or web address] x 15(B)  [email or web address] x 15(B)     Gluteus Medius (Crab Walk)          Knee Extension [email or web address]@[employer] x 15(B) [email or web address] x 15(B) [email or web address] x 15(B) [email or web address] x 15(B) [email or web address] x 15(B)     Knee Flexion [email or web address] x 15(B) [email or web address] x 15(B) [email or web address] x 15(B) [email or web address] x 15(B) [email or web address] x 15(B)     Step Downs 2 x 15(B) 2 x 15(B) 3 x 15(B) 3 x 15(B) 3 x 15(B)               Stationary Bike   10 min 10 min 10 min     Treadmill          NuStep 10 min. 10 min                  FLEXIBILITY:          Heel Cord          Hamstring          Hip Flexors                      [facility] Portal  Evaluation: (  )  Manual Therapy (     ):   Therapeutic Modalities:                                                                                               HEP: As above; handouts given to patient for all exercises. Treatment/Session Assessment:    · Response to Treatment:  The patient tolerated today's treatment without symptom exacerbation. The patient demonstrated exercise tolerance and feels the therapy is benefiting. He required manual and verbal cuing. The patient should progress to an independent home exercise program within a few weeks.    · Compliance with Program/Exercises: The patient is compliant with his home exercise program.  · Recommendations/Intent for next treatment session: \"Next visit will focus on advancements to more challenging activities\".    Total Treatment Duration:  PT Patient Time In/Time Out  Time In: 0800  Time Out: 0900    Rodrigo Ramirez PT

## 2018-02-27 ENCOUNTER — HOSPITAL ENCOUNTER (OUTPATIENT)
Dept: PHYSICAL THERAPY | Age: 73
Discharge: HOME | End: 2018-02-27
Payer: MEDICARE

## 2018-02-27 PROCEDURE — 97110 THERAPEUTIC EXERCISES: CPT

## 2018-02-27 NOTE — THERAPY EVALUATION
Mavis Capellan  : 1945  [place] / Product Type: Medicare /  [address]  at [facility]  [address], [address]  [phone]   [phone]      OUTPATIENT PHYSICAL THERAPY:Daily Note 2018    ICD-10: Treatment Diagnosis:  R26.2 Difficulty in walking, not elsewhere classified     M54.5 Low back pain   Precautions/Allergies:   Sulfa (sulfonamide antibiotics)   Fall Risk Score: 2 (? 5 = High Risk)  MD Orders: Evaluate and treat MEDICAL/REFERRING DIAGNOSIS:  Pain in right hip [M25.551]  Other intervertebral disc degeneration, lumbar region [M51.36]   DATE OF ONSET: 2017  REFERRING PHYSICIAN: Jack Walsh, *  RETURN PHYSICIAN APPOINTMENT: TBD     INITIAL ASSESSMENT:  Mr. Louis Zamora reported an onset of lumbar pain and R hip pain / weakness beginning 2017. He is an avid golfer and his symptoms are causing weight bearing activities to be increasingly difficult. Further evaluation revealed R hip abductor weakness which contributes to his R LE limp, and may be affecting his lumbar pain. He has been referred to physical therapy for treatment. PROBLEM LIST (Impacting functional limitations):  1. Decreased Strength  2. Decreased ADL/Functional Activities  3. Increased Pain  4. Decreased Activity Tolerance INTERVENTIONS PLANNED:  1. Home Exercise Program (HEP)  2. Manual Therapy  3. Neuromuscular Re-education/Strengthening  4. Therapeutic Exercise/Strengthening   TREATMENT PLAN:  Effective Dates: 2018 TO 3/26/2018. Frequency/Duration: 2 times a week for 8 weeks  GOALS: (Goals have been discussed and agreed upon with patient.)  SHORT-TERM FUNCTIONAL GOALS: Time Frame: 4 weeks  1. Increase LEFS 4 points to decrease pain 1-2 levels. 2. Increase R LE strength to decrease pain 1-2 levels. 3. Increase R LE strength to improve gait and decreased R LE limp with golf.   DISCHARGE GOALS: Time Frame: 12 weeks  1. Increase LEFS 9 points to decrease pain +2 levels. 2. Increase R LE strength to allow a normal gait pattern and no R LE limp with golf. .  3. Demonstrate independence in home exercises to allow DC from physical therapy. Rehabilitation Potential For Stated Goals: Good  Regarding Marquez Siddiqui's therapy, I certify that the treatment plan above will be carried out by a therapist or under their direction. Thank you for this referral,  Norma Torres, PT                 The information in this section was collected on 1/26/2018 (except where otherwise noted). HISTORY:   History of Present Injury/Illness (Reason for Referral): The patient reported an onset of lumbar pain and R hip pain / weakness beginning June 2017. He is an avid golfer and his symptoms are causing weight bearing activities to be increasingly difficult. Further evaluation revealed R hip abductor weakness which contributes to his R LE limp, and may be affecting his lumbar pain. He has been referred to physical therapy for treatment. Past Medical History/Comorbidities:   Mr. Louis Zamora  has a past medical history of Abdominal aneurysm without mention of rupture (9/30/2015); Benign localized hyperplasia of prostate without urinary obstruction and other lower urinary tract symptoms (LUTS) (9/30/2015); Bladder neck obstruction (9/30/2015); Cancer ([facility] [address] (02/2017); Gastrointestinal disorder; H/O testicular mass (9/30/2015); Hypertension; Inguinal hernia with obstruction, without mention of gangrene, recurrent unilateral or unspecified (9/30/2015); Other and unspecified hyperlipidemia (9/30/2015); Other ill-defined conditions; and Renal insufficiency (9/30/2015). Mr. Louis Zamora  has a past surgical history that includes hx other surgical; hx orthopaedic (Right); hx orthopaedic (Right); and hx other surgical (Right, 02/2017).   Social History/Living Environment:      Prior Level of Function/Work/Activity:  Retired  Dominant Side:         RIGHT  Current Medications:       Current Outpatient Prescriptions:     amLODIPine (NORVASC) 2.5 mg tablet, Take 7.5 mg by mouth daily. , Disp: , Rfl:     atorvastatin (LIPITOR) 40 mg tablet, Take  by mouth daily. 1-1/2 tablet daily, Disp: , Rfl:     finasteride (PROSCAR) 5 mg tablet, Take 5 mg by mouth daily. , Disp: , Rfl:     Aspirin, Buffered 81 mg tab, Take 1 Tab by mouth daily. Takes every other day, Disp: , Rfl:     tamsulosin (FLOMAX) 0.4 mg capsule, Take 0.4 mg by mouth daily. , Disp: , Rfl:     omeprazole (PRILOSEC) 10 mg capsule, Take 10 mg by mouth daily. , Disp: , Rfl:    Date Last Reviewed:  2/27/2018   Number of Personal Factors/Comorbidities that affect the Plan of Care: 1-2: MODERATE COMPLEXITY   EXAMINATION:     Observation/Orthostatic Postural Assessment:   Slightly kyphotic with a R LE limp. Palpation:   Tenderness to her lumbar spine and lateral right hip. ROM: AROM : WNL                          Strength:    -4/5 R LE Strength      5/5 L LE Strength           Special Tests: N/A  Neurological Screen: N/A  Functional Mobility: Independent   Balance:  Good. Body Structures Involved:  1. Joints  2. Muscles Body Functions Affected:  1. Sensory/Pain  2. Neuromusculoskeletal  3. Movement Related Activities and Participation Affected:  1. General Tasks and Demands  2. Mobility  3. Community, Social and [place]   Number of elements (examined above) that affect the Plan of Care: 3: MODERATE COMPLEXITY   CLINICAL PRESENTATION:   Presentation: Evolving clinical presentation with changing clinical characteristics: MODERATE COMPLEXITY   CLINICAL DECISION MAKING:   Outcome Measure: Tool Used: Lower Extremity Functional Scale (LEFS)  Score:  Initial: 39/80 Most Recent: X/80 (Date: -- )   Interpretation of Score: 20 questions each scored on a 5 point scale with 0 representing \"extreme difficulty or unable to perform\" and 4 representing \"no difficulty\".   The lower the score, the greater the functional disability. 80/80 represents no disability. Minimal detectable change is 9 points. Score 80 79-63 62-48 47-32 31-16 15-1 0   Modifier CH CI CJ CK CL CM CN     ? Mobility - Walking and Moving Around:     - CURRENT STATUS: CK - 40%-59% impaired, limited or restricted    - GOAL STATUS: CJ - 20%-39% impaired, limited or restricted    - D/C STATUS:  ---------------To be determined---------------    Medical Necessity:   · Patient demonstrates good rehab potential due to higher previous functional level. Reason for Services/Other Comments:  · Patient continues to require skilled intervention due to his inability to perform weight bearing activities and ADLs due to R LE pain and weakness. .   Use of outcome tool(s) and clinical judgement create a POC that gives a: Questionable prediction of patient's progress: MODERATE COMPLEXITY            TREATMENT:   (In addition to Assessment/Re-Assessment sessions the following treatments were rendered)  Pre-treatment Symptoms/Complaints:  The patient feels he can DC to a HEP following his next visit. Pain: Initial:   Pain Intensity 1: 3  Post Session:  4   Therapeutic Exercise: ( 60): The patient received treatment as below to improve mobility, strength and balance. Required moderate verbal and manual cues to promote proper body alignment, promote proper body posture and promote proper body mechanics. Progressed resistance, range and repetitions as indicated. The patient received exercise instruction followed by patient demonstration of the exercises to include AROM, PROM, and strengthening exercises for  step downs, gluteus medius, hip abduction, lateral plank, crab walk, bridging, single hip and knee extension in supine, and heel cord stretching to stabilize the patient's LE and improve AROM to decrease spasms, pain, and improve function.    Date:  1/30/2018 Date:  2/2/2018 Date:  2/5/2018 Date:  2/13/2018 Date:  2/21/2018 Date:  2/27/2018 Date: Activity/Exercise Parameters Parameters     Parameters   MAT  EXERCISES:          Hip ER / Abduction          Hip Adduction (Ball Squeeze)          Bridging          Straight Leg Raise          SAQ          Hip Abduction (Sidelying)          Gluteus Medius (Crab Walk) 3 x 10(B) 10 x 5(B) 10 x 5(B) 10 x 5(B) 10 x 5(B) 10 x 5(B)    Sit To Stand                    STANDING HIP MACHINE:          Hip Abduction [email or web address] x 15(B) [email or web address] x 15(B) [email or web address] x 15(B) [email or web address] x 15(B) [email or web address] x 15(B) [email or web address] x 15(B)    Hip Flexion [email or web address] x 15(B) [email or web address] x 15(B) [email or web address] x 15(B) [email or web address] x 15(B) [email or web address] x 15(B) [email or web address] x 15(B)    Hip Extension [email or web address] x 15(B) [email or web address] x 15(B) [email or web address] x 15(B) [email or web address] x 15(B) [email or web address] x 15(B) [email or web address] x 15(B)    Hip Adduction          Standing Knee Extension    [email or web address] x 15(B) [email or web address] x 15(B) [email or web address] x 15(B)    4 Way Cable Walkout [email or web address] x 15 [email or web address] x 15 [email or web address] x 15(B)  [email or web address] x 15(B) [email or web address] x 15(B)    Gluteus Medius (Crab Walk)          Knee Extension [email or web address] x 15(B) [email or web address] x 15(B) [email or web address] x 15(B) [email or web address] x 15(B) [email or web address] x 15(B) [email or web address] x 15(B)    Knee Flexion [email or web address] x 15(B) [email or web address] x 15(B) [email or web address] x 15(B) [email or web address] x 15(B) [email or web address] x 15(B) [email or web address] x 15(B)    Step Downs 2 x 15(B) 2 x 15(B) 3 x 15(B) 3 x 15(B) 3 x 15(B)               Stationary Bike   10 min 10 min 10 min 10 min    Treadmill          NuStep 10 min. 10 min                  FLEXIBILITY:          Heel Cord          Hamstring          Hip Flexors                      [facility] Portal  Evaluation: (  )  Manual Therapy (     ):   Therapeutic Modalities:                                                                                               HEP: As above; handouts given to patient for all exercises. Treatment/Session Assessment:    · Response to Treatment:  The patient tolerated today's treatment without symptom exacerbation. The patient demonstrated exercise tolerance and feels the therapy is benefiting. He required manual and verbal cuing. The patient should progress to an independent home exercise program after his next visit. Stephan Andrade · Compliance with Program/Exercises: The patient is compliant with his home exercise program.  · Recommendations/Intent for next treatment session: \"Next visit will focus on advancements to more challenging activities\".    Total Treatment Duration:  PT Patient Time In/Time Out  Time In: 0900  Time Out: [address] PT

## 2018-03-07 ENCOUNTER — HOSPITAL ENCOUNTER (OUTPATIENT)
Dept: PHYSICAL THERAPY | Age: 73
Discharge: HOME | End: 2018-03-07
Payer: MEDICARE

## 2018-03-07 PROCEDURE — 97110 THERAPEUTIC EXERCISES: CPT

## 2018-03-07 NOTE — THERAPY EVALUATION
Sharan Hernandez  : 1945  [place] / Product Type: Medicare /  [address]  at [place] [address] Therapy  [address], [address]  [phone]   [phone]      OUTPATIENT PHYSICAL THERAPY:Daily Note 3/7/2018    ICD-10: Treatment Diagnosis:  R26.2 Difficulty in walking, not elsewhere classified     M54.5 Low back pain   Precautions/Allergies:   Sulfa (sulfonamide antibiotics)   Fall Risk Score: 2 (? 5 = High Risk)  MD Orders: Evaluate and treat MEDICAL/REFERRING DIAGNOSIS:  Pain in right hip [M25.551]  Other intervertebral disc degeneration, lumbar region [M51.36]   DATE OF ONSET: 2017  REFERRING PHYSICIAN: Oswaldo Nuno., *  RETURN PHYSICIAN APPOINTMENT: TBD     INITIAL ASSESSMENT:  Mr. Je Trammell reported an onset of lumbar pain and R hip pain / weakness beginning 2017. He is an avid golfer and his symptoms are causing weight bearing activities to be increasingly difficult. Further evaluation revealed R hip abductor weakness which contributes to his R LE limp, and may be affecting his lumbar pain. He has been referred to physical therapy for treatment. PROBLEM LIST (Impacting functional limitations):  1. Decreased Strength  2. Decreased ADL/Functional Activities  3. Increased Pain  4. Decreased Activity Tolerance INTERVENTIONS PLANNED:  1. Home Exercise Program (HEP)  2. Manual Therapy  3. Neuromuscular Re-education/Strengthening  4. Therapeutic Exercise/Strengthening   TREATMENT PLAN:  Effective Dates: 2018 TO 3/26/2018. Frequency/Duration: 2 times a week for 8 weeks  GOALS: (Goals have been discussed and agreed upon with patient.)  SHORT-TERM FUNCTIONAL GOALS: Time Frame: 4 weeks  1. Increase LEFS 4 points to decrease pain 1-2 levels. 2. Increase R LE strength to decrease pain 1-2 levels. 3. Increase R LE strength to improve gait and decreased R LE limp with golf.   DISCHARGE GOALS: Time Frame: 12 weeks  1. Increase LEFS 9 points to decrease pain +2 levels. 2. Increase R LE strength to allow a normal gait pattern and no R LE limp with golf. .  3. Demonstrate independence in home exercises to allow DC from physical therapy. Rehabilitation Potential For Stated Goals: Good  Regarding Marquez Siddiqui's therapy, I certify that the treatment plan above will be carried out by a therapist or under their direction. Thank you for this referral,  Mena Green, PT                 The information in this section was collected on 1/26/2018 (except where otherwise noted). HISTORY:   History of Present Injury/Illness (Reason for Referral): The patient reported an onset of lumbar pain and R hip pain / weakness beginning June 2017. He is an avid golfer and his symptoms are causing weight bearing activities to be increasingly difficult. Further evaluation revealed R hip abductor weakness which contributes to his R LE limp, and may be affecting his lumbar pain. He has been referred to physical therapy for treatment. Past Medical History/Comorbidities:   Mr. Je Trammell  has a past medical history of Abdominal aneurysm without mention of rupture (9/30/2015); Benign localized hyperplasia of prostate without urinary obstruction and other lower urinary tract symptoms (LUTS) (9/30/2015); Bladder neck obstruction (9/30/2015); Cancer ([facility] [address] (02/2017); Gastrointestinal disorder; H/O testicular mass (9/30/2015); Hypertension; Inguinal hernia with obstruction, without mention of gangrene, recurrent unilateral or unspecified (9/30/2015); Other and unspecified hyperlipidemia (9/30/2015); Other ill-defined conditions; and Renal insufficiency (9/30/2015). Mr. Je Trammell  has a past surgical history that includes hx other surgical; hx orthopaedic (Right); hx orthopaedic (Right); and hx other surgical (Right, 02/2017).   Social History/Living Environment:      Prior Level of Function/Work/Activity:  Retired  Dominant Side: RIGHT  Current Medications:       Current Outpatient Prescriptions:     amLODIPine (NORVASC) 2.5 mg tablet, Take 7.5 mg by mouth daily. , Disp: , Rfl:     atorvastatin (LIPITOR) 40 mg tablet, Take  by mouth daily. 1-1/2 tablet daily, Disp: , Rfl:     finasteride (PROSCAR) 5 mg tablet, Take 5 mg by mouth daily. , Disp: , Rfl:     Aspirin, Buffered 81 mg tab, Take 1 Tab by mouth daily. Takes every other day, Disp: , Rfl:     tamsulosin (FLOMAX) 0.4 mg capsule, Take 0.4 mg by mouth daily. , Disp: , Rfl:     omeprazole (PRILOSEC) 10 mg capsule, Take 10 mg by mouth daily. , Disp: , Rfl:    Date Last Reviewed:  3/7/2018   Number of Personal Factors/Comorbidities that affect the Plan of Care: 1-2: MODERATE COMPLEXITY   EXAMINATION:     Observation/Orthostatic Postural Assessment:   Slightly kyphotic with a R LE limp. Palpation:   Tenderness to her lumbar spine and lateral right hip. ROM: AROM : WNL                          Strength:    -4/5 R LE Strength      5/5 L LE Strength           Special Tests: N/A  Neurological Screen: N/A  Functional Mobility: Independent   Balance:  Good. Body Structures Involved:  1. Joints  2. Muscles Body Functions Affected:  1. Sensory/Pain  2. Neuromusculoskeletal  3. Movement Related Activities and Participation Affected:  1. General Tasks and Demands  2. Mobility  3. Community, Social and [place]   Number of elements (examined above) that affect the Plan of Care: 3: MODERATE COMPLEXITY   CLINICAL PRESENTATION:   Presentation: Evolving clinical presentation with changing clinical characteristics: MODERATE COMPLEXITY   CLINICAL DECISION MAKING:   Outcome Measure: Tool Used: Lower Extremity Functional Scale (LEFS)  Score:  Initial: 39/80 Most Recent: X/80 (Date: -- )   Interpretation of Score: 20 questions each scored on a 5 point scale with 0 representing \"extreme difficulty or unable to perform\" and 4 representing \"no difficulty\".   The lower the score, the greater the functional disability. 80/80 represents no disability. Minimal detectable change is 9 points. Score 80 79-63 62-48 47-32 31-16 15-1 0   Modifier CH CI CJ CK CL CM CN     ? Mobility - Walking and Moving Around:     - CURRENT STATUS: CK - 40%-59% impaired, limited or restricted    - GOAL STATUS: CJ - 20%-39% impaired, limited or restricted    - D/C STATUS:  ---------------To be determined---------------    Medical Necessity:   · Patient demonstrates good rehab potential due to higher previous functional level. Reason for Services/Other Comments:  · Patient continues to require skilled intervention due to his inability to perform weight bearing activities and ADLs due to R LE pain and weakness. .   Use of outcome tool(s) and clinical judgement create a POC that gives a: Questionable prediction of patient's progress: MODERATE COMPLEXITY            TREATMENT:   (In addition to Assessment/Re-Assessment sessions the following treatments were rendered)  Pre-treatment Symptoms/Complaints:  The patient feels he can DC to and would like to return from his trip to decide. Pain: Initial:   Pain Intensity 1: 3  Post Session:  2   Therapeutic Exercise: ( 60): The patient received treatment as below to improve mobility, strength and balance. Required moderate verbal and manual cues to promote proper body alignment, promote proper body posture and promote proper body mechanics. Progressed resistance, range and repetitions as indicated. The patient received exercise instruction followed by patient demonstration of the exercises to include AROM, PROM, and strengthening exercises for  step downs, gluteus medius, hip abduction, lateral plank, crab walk, bridging, single hip and knee extension in supine, and heel cord stretching to stabilize the patient's LE and improve AROM to decrease spasms, pain, and improve function.    Date:  1/30/2018 Date:  2/2/2018 Date:  2/5/2018 Date:  2/13/2018 Date:  2/21/2018 Date:  2/27/2018 Date:  3/7/2018   Activity/Exercise Parameters Parameters     Parameters   MAT  EXERCISES:          Hip ER / Abduction          Hip Adduction (Ball Squeeze)          Bridging          Straight Leg Raise          SAQ          Hip Abduction (Sidelying)          Gluteus Medius (Crab Walk) 3 x 10(B) 10 x 5(B) 10 x 5(B) 10 x 5(B) 10 x 5(B) 10 x 5(B)    Sit To Stand                    STANDING HIP MACHINE:          Hip Abduction [email or web address] x 15(B) [email or web address] x 15(B) [email or web address] x 15(B) [email or web address] x 15(B) [email or web address] x 15(B) [email or web address] x 15(B) [email or web address] x 15(B)   Hip Flexion [email or web address] x 15(B) [email or web address] x 15(B) [email or web address] x 15(B) [email or web address] x 15(B) [email or web address] x 15(B) [email or web address] x 15(B) [email or web address] x 15(B)   Hip Extension [email or web address] x 15(B) [email or web address] x 15(B) [email or web address] x 15(B) [email or web address] x 15(B) [email or web address] x 15(B) [email or web address] x 15(B) [email or web address] x 15(B)   Hip Adduction          Standing Knee Extension    [email or web address] x 15(B) [email or web address] x 15(B) [email or web address] x 15(B) [email or web address] x 15(B)   4 Way Cable Walkout [email or web address] x 15 [email or web address] x 15 [email or web address] x 15(B)  [email or web address] x 15(B) [email or web address] x 15(B) [email or web address] x 15(B)   Gluteus Medius (Crab Walk)          Knee Extension [email or web address] x 15(B) [email or web address] x 15(B) [email or web address] x 15(B) [email or web address] x 15(B) [email or web address] x 15(B) [email or web address] x 15(B) [email or web address] x 15(B)   Knee Flexion [email or web address] x 15(B) [email or web address] x 15(B) [email or web address] x 15(B) [email or web address] x 15(B) [email or web address] x 15(B) [email or web address] x 15(B) [email or web address] x 15(B)   Step Downs 2 x 15(B) 2 x 15(B) 3 x 15(B) 3 x 15(B) 3 x 15(B)               Stationary Bike   10 min 10 min 10 min 10 min 10 min   Treadmill          NuStep 10 min. 10 min                  FLEXIBILITY:          Heel Cord          Hamstring          Hip Flexors                      [facility]  Evaluation: (  )  Manual Therapy (     ):   Therapeutic Modalities:                                                                                               HEP: As above; handouts given to patient for all exercises. Treatment/Session Assessment:    · Response to Treatment:  The patient tolerated today's treatment without symptom exacerbation. The patient demonstrated exercise tolerance and feels the therapy is benefiting.   He required manual and verbal cuing. The patient is traveling to [place] next week and may continue on his HEP at home. · Compliance with Program/Exercises: The patient is compliant with his home exercise program.  · Recommendations/Intent for next treatment session: He will likely continue his HEP independently.   Total Treatment Duration:  PT Patient Time In/Time Out  Time In: 0900  Time Out: [address] PT

## 2018-04-30 NOTE — THERAPY EVALUATION
Johnny Shrestha  : 1945  [place] / Product Type: Medicare /  [address]  at [place] [address] Therapy  [address] [place] [address], [address]  [phone]   [phone]      OUTPATIENT PHYSICAL THERAPY:Discontinuation Summary 2018    ICD-10: Treatment Diagnosis:  R26.2 Difficulty in walking, not elsewhere classified     M54.5 Low back pain   Precautions/Allergies:   Sulfa (sulfonamide antibiotics)   Fall Risk Score: 2 (? 5 = High Risk)  MD Orders: Evaluate and treat MEDICAL/REFERRING DIAGNOSIS:  Pain in right hip [M25.551]  Other intervertebral disc degeneration, lumbar region [M51.36]   DATE OF ONSET: 2017  REFERRING PHYSICIAN: Deborah Begum., *  RETURN PHYSICIAN APPOINTMENT: TBD     INITIAL ASSESSMENT:  Mr. Savanah Mays reported an onset of lumbar pain and R hip pain / weakness beginning 2017. He is an avid golfer and his symptoms are causing weight bearing activities to be increasingly difficult. Further evaluation revealed R hip abductor weakness which contributes to his R LE limp, and may be affecting his lumbar pain. He has been referred to physical therapy for treatment. PROBLEM LIST (Impacting functional limitations):  1. Decreased Strength  2. Decreased ADL/Functional Activities  3. Increased Pain  4. Decreased Activity Tolerance INTERVENTIONS PLANNED:  1. Home Exercise Program (HEP)  2. Manual Therapy  3. Neuromuscular Re-education/Strengthening  4. Therapeutic Exercise/Strengthening   TREATMENT PLAN:  Effective Dates: 2018 TO 3/26/2018. Frequency/Duration: 2 times a week for 8 weeks             VOLUNTARY DC  GOALS: (Goals have been discussed and agreed upon with patient.)  SHORT-TERM FUNCTIONAL GOALS: Time Frame: 4 weeks  1. Increase LEFS 4 points to decrease pain 1-2 levels. 2. Increase R LE strength to decrease pain 1-2 levels.   3. Increase R LE strength to improve gait and decreased R LE limp with golf. DISCHARGE GOALS: Time Frame: 12 weeks  1. Increase LEFS 9 points to decrease pain +2 levels. 2. Increase R LE strength to allow a normal gait pattern and no R LE limp with golf. .  3. Demonstrate independence in home exercises to allow DC from physical therapy. Rehabilitation Potential For Stated Goals: Good  Regarding Marquez Siddiqui's therapy, I certify that the treatment plan above will be carried out by a therapist or under their direction. Thank you for this referral,  Lila Patel., PT                 The information in this section was collected on 1/26/2018 (except where otherwise noted). HISTORY:   History of Present Injury/Illness (Reason for Referral): The patient reported an onset of lumbar pain and R hip pain / weakness beginning June 2017. He is an avid golfer and his symptoms are causing weight bearing activities to be increasingly difficult. Further evaluation revealed R hip abductor weakness which contributes to his R LE limp, and may be affecting his lumbar pain. He has been referred to physical therapy for treatment. Past Medical History/Comorbidities:   Mr. Dani Queen  has a past medical history of Abdominal aneurysm without mention of rupture ([facility] [address] (9/30/2015); Benign localized hyperplasia of prostate without urinary obstruction and other lower urinary tract symptoms (LUTS) (9/30/2015); Bladder neck obstruction (9/30/2015); Cancer ([address] (02/2017); Gastrointestinal disorder; H/O testicular mass (9/30/2015); Hypertension; Inguinal hernia with obstruction, without mention of gangrene, recurrent unilateral or unspecified (9/30/2015); Other and unspecified hyperlipidemia (9/30/2015); Other ill-defined conditions; and Renal insufficiency (9/30/2015). Mr. Dani Queen  has a past surgical history that includes hx other surgical; hx orthopaedic (Right); hx orthopaedic (Right); and hx other surgical (Right, 02/2017).   Social History/Living Environment:      Prior Level of Function/Work/Activity:  Retired  Dominant Side:         RIGHT  Current Medications:       Current Outpatient Prescriptions:     amLODIPine (NORVASC) 2.5 mg tablet, Take 7.5 mg by mouth daily. , Disp: , Rfl:     atorvastatin (LIPITOR) 40 mg tablet, Take  by mouth daily. 1-1/2 tablet daily, Disp: , Rfl:     finasteride (PROSCAR) 5 mg tablet, Take 5 mg by mouth daily. , Disp: , Rfl:     Aspirin, Buffered 81 mg tab, Take 1 Tab by mouth daily. Takes every other day, Disp: , Rfl:     tamsulosin (FLOMAX) 0.4 mg capsule, Take 0.4 mg by mouth daily. , Disp: , Rfl:     omeprazole (PRILOSEC) 10 mg capsule, Take 10 mg by mouth daily. , Disp: , Rfl:    Date Last Reviewed:  3/7/2018   Number of Personal Factors/Comorbidities that affect the Plan of Care: 1-2: MODERATE COMPLEXITY   EXAMINATION:     Observation/Orthostatic Postural Assessment:   Slightly kyphotic with a R LE limp. Palpation:   Tenderness to her lumbar spine and lateral right hip. ROM: AROM : WNL                          Strength:    -4/5 R LE Strength      5/5 L LE Strength           Special Tests: N/A  Neurological Screen: N/A  Functional Mobility: Independent   Balance:  Good. Body Structures Involved:  1. Joints  2. Muscles Body Functions Affected:  1. Sensory/Pain  2. Neuromusculoskeletal  3. Movement Related Activities and Participation Affected:  1. General Tasks and Demands  2. Mobility  3. Community, Social and [place]   Number of elements (examined above) that affect the Plan of Care: 3: MODERATE COMPLEXITY   CLINICAL PRESENTATION:   Presentation: Evolving clinical presentation with changing clinical characteristics: MODERATE COMPLEXITY   CLINICAL DECISION MAKING:   Outcome Measure:    Tool Used: Lower Extremity Functional Scale (LEFS)  Score:  Initial: 39/80 Most Recent: X/80 (Date: -- )   Interpretation of Score: 20 questions each scored on a 5 point scale with 0 representing \"extreme difficulty or unable to perform\" and 4 representing \"no difficulty\". The lower the score, the greater the functional disability. 80/80 represents no disability. Minimal detectable change is 9 points. Score 80 79-63 62-48 47-32 31-16 15-1 0   Modifier CH CI CJ CK CL CM CN     ? Mobility - Walking and Moving Around:     - CURRENT STATUS: CK - 40%-59% impaired, limited or restricted    - GOAL STATUS: CJ - 20%-39% impaired, limited or restricted    - D/C STATUS:  ---------------To be determined---------------    Medical Necessity:   · Patient demonstrates good rehab potential due to higher previous functional level. Reason for Services/Other Comments:  · Patient continues to require skilled intervention due to his inability to perform weight bearing activities and ADLs due to R LE pain and weakness. .   Use of outcome tool(s) and clinical judgement create a POC that gives a: Questionable prediction of patient's progress: MODERATE COMPLEXITY            TREATMENT:       Treatment/Session Assessment:    · Response to Treatment:  The patient voluntarily DC PT. Recommendations/Intent for next treatment session: Voluntary DC.   Tomer Parish., PT

## 2020-01-13 ENCOUNTER — HOSPITAL ENCOUNTER (OUTPATIENT)
Dept: PHYSICAL THERAPY | Age: 75
Discharge: HOME | End: 2020-01-13
Payer: MEDICARE

## 2020-01-13 PROCEDURE — 97162 PT EVAL MOD COMPLEX 30 MIN: CPT

## 2020-01-13 PROCEDURE — 97110 THERAPEUTIC EXERCISES: CPT

## 2020-01-13 NOTE — THERAPY EVALUATION
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 Frequency/Duration: 2 times a week for 90 Day(s) MEDICAL/REFERRING DIAGNOSIS:  Other specified dorsopathies, site unspecified [M53.80]  Spondylosis without myelopathy or radiculopathy, lumbosacral region [M47.817]  Spondylolisthesis, multiple sites in spine [M43.19]  Other intervertebral disc degeneration, lumbar region [M51.36]   DATE OF ONSET: Approximately 1 year ago  REFERRING PHYSICIAN: Irene Garcia MD MD Orders: Evaluate and Treat  Return MD Appointment: Pt has no follow up appt at this time     INITIAL ASSESSMENT:  Mr. Flavio Bhakta presents with decreased lumbar ROM, decreased bilateral hip strength/flexibility and increased pain leading to decreased functional status. Pt would benefit from skilled physical therapy services to address the above deficits and help patient return to prior level of function. PROBLEM LIST (Impacting functional limitations):  1. Decreased Strength  2. Decreased ADL/Functional Activities  3. Increased Pain  4. Decreased Flexibility/Joint Mobility  5. Decreased [place] with Home Exercise Program INTERVENTIONS PLANNED: (Treatment may consist of any combination of the following)  1. Cold  2. Cryotherapy  3. Electrical Stimulation  4. Heat  5. Home Exercise Program (HEP)  6. Manual Therapy  7. Range of Motion (ROM)  8. Therapeutic Exercise/Strengthening  9. Ultrasound (US)  10.  Aquatic Therapy     GOALS: (Goals have been discussed and agreed upon with patient.)  Short-Term Functional Goals: Time Frame: 4 weeks  1. Pt will increase lumbar ROM flexion = 60 degrees, side bending = 30 degrees bilaterally to assist with household ADL's  2. Pt will increase SLR 50 degrees bilaterally to assist with household ADL's  3. Pt will be independent with HEP  Discharge Goals: Time Frame: 12 weeks  1. Pt will increase SLR 60 degrees bilaterally to assist with household ADL's  2. Pt will increase strength bilateral LE's 5/5 to assist with household ADL's  3. Pt will perfrom 20 minutes household cleaning activities independently with min to no c/o LBP    OUTCOME MEASURE:   Tool Used: Modified Oswestry Low Back Pain Questionnaire  Score:  Initial: 12/50  Most Recent: X/50 (Date: -- )   Interpretation of Score: Each section is scored on a 0-5 scale, 5 representing the greatest disability. The scores of each section are added together for a total score of 50. MEDICAL NECESSITY:   · Patient is expected to demonstrate progress in strength, range of motion and functional technique to increase independence with household ADL's. · Patient demonstrates good rehab potential due to higher previous functional level. REASON FOR SERVICES/OTHER COMMENTS:  · Patient continues to require skilled intervention due to decreased lumbar ROM, LE strength/flexibility and increased pain leading to decreased functional status. Total Duration:  PT Patient Time In/Time Out  Time In: 1515  Time Out: 1600    Rehabilitation Potential For Stated Goals: Good  Regarding Marquez Siddiqui's therapy, I certify that the treatment plan above will be carried out by a therapist or under their direction.   Thank you for this referral,  Elías Sexton PT     Referring Physician Signature: Amy Koyanagi, MD _______________________________ Date _____________     PAIN/SUBJECTIVE:   Initial:   6 Post Session:  5/10   HISTORY:   History of Injury/Illness (Reason for Referral):  Pt reports insidious onset bilateral low back pain of approximately 1 year duration. 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 He had an ELEAZAR 12-19-19. He denies any long term relief from the injection. Pt reports marked difficulties standing to wash dishes and perform household ADL's due to his LBP. Aggravating factors include standing. Relieving factors include sitting in a recliner and sitting in the car. He states Dr Evon Key told him he is not a surgical candidate. Past Medical History/Comorbidities:  Mr. Terry Ko  has a past medical history of Abdominal aneurysm without mention of rupture ([address] (9/30/2015), Benign localized hyperplasia of prostate without urinary obstruction and other lower urinary tract symptoms (LUTS) (9/30/2015), Bladder neck obstruction (9/30/2015), Cancer ([address] (02/2017), Gastrointestinal disorder, H/O testicular mass (9/30/2015), Hypertension, Inguinal hernia with obstruction, without mention of gangrene, recurrent unilateral or unspecified (9/30/2015), Other and unspecified hyperlipidemia (9/30/2015), Other ill-defined conditions, and Renal insufficiency (9/30/2015). Mr. Terry Ko  has a past surgical history that includes hx other surgical; hx orthopaedic (Right); hx orthopaedic (Right); and hx other surgical (Right, 02/2017). Social History/Living Environment:     Pt lives in a one story house with one step to enter.   He denies difficulties ascending/descending stairs due to his back pain  Prior Level of Function/Work/Activity:  Pt is retired  Dominant Side:         RIGHT  Other Clinical Tests:          MRI lumbar spine ( see above)  Personal Factors:          Sex:  male        Age:  76 y.o. Profession:  Pt is retired   Ambulatory/Rehab Services H2 Model Falls Risk Assessment   Risk Factors:       (1)  Gender [Male] Ability to Rise from Chair:       (0)  Ability to rise in a single movement   Falls Prevention Plan:       No modifications necessary   Total: (5 or greater = High Risk): 1   ©2010 [facility] [place] [address] [place] [address] Patent #[id number]. Federal Law prohibits the replication, distribution or use without written permission from [facility] [employer]   Current Medications:       Current Outpatient Medications:     amLODIPine (NORVASC) 2.5 mg tablet, Take 7.5 mg by mouth daily. , Disp: , Rfl:     atorvastatin (LIPITOR) 40 mg tablet, Take  by mouth daily. 1-1/2 tablet daily, Disp: , Rfl:     finasteride (PROSCAR) 5 mg tablet, Take 5 mg by mouth daily. , Disp: , Rfl:     Aspirin, Buffered 81 mg tab, Take 1 Tab by mouth daily. Takes every other day, Disp: , Rfl:     tamsulosin (FLOMAX) 0.4 mg capsule, Take 0.4 mg by mouth daily. , Disp: , Rfl:     omeprazole (PRILOSEC) 10 mg capsule, Take 10 mg by mouth daily. , Disp: , Rfl:    Date Last Reviewed:  01-13-20   Number of Personal Factors/Comorbidities that affect the Plan of Care: 1-2: MODERATE COMPLEXITY   EXAMINATION:   Observation/Orthostatic Postural Assessment:           Forward head, rounded shoulders; decreased lumbar lordosis  Palpation:          Tenderness bilateral thoracolumbar paraspinals and bilateral QL  ROM:    Lumbar Flexion = 45 degrees  Lumbar Extension = 20 degrees  Lumbar Side Bending R = 22 degrees  Lumbar Side Bending L = 24 degrees    Strength:    R hip flexion = 4+/5  R hip abduction = 4+/5  R hip adduction = 5/5  R knee extension = 5/5  R knee flexion = 5/5  R ankle dorsiflexion = 5/5  R ankle plantarflexion = 5/5    L hip flexion = 4+/5  L hip abduction = 4+/5  L hip adduction = 5/5  L knee extension = 5/5  L knee flexion = 5/5  L ankle dorsiflexion = 5/5  L ankle plantarflexion = 5/5    Special Tests:          SLR 40 degrees with marked hamstring tightness noted bilaterally  Neurological Screen:        Myotomes:  No significant deficits noted        Dermatomes:  Sensation intact to light touch bilateral LE's        Reflexes:  DTR's 2+ to bilateral patellar and achilles  Functional Mobility:         Gait/Ambulation:  No significant deficits noted        Transfers:  Pt able to transition sit to supine and supine to sit independently    Body Structures Involved:  1. Bones  2. Joints  3. Muscles Body Functions Affected:  1. Sensory/Pain  2. Neuromusculoskeletal Activities and Participation Affected:  1. Mobility  2.  Domestic Life   Number of elements (examined above) that affect the Plan of Care: 3: MODERATE COMPLEXITY   CLINICAL PRESENTATION:   Presentation: Evolving clinical presentation with changing clinical characteristics: MODERATE COMPLEXITY   CLINICAL DECISION MAKING:   Use of outcome tool(s) and clinical judgement create a POC that gives a: Questionable prediction of patient's progress: MODERATE COMPLEXITY

## 2020-01-13 NOTE — PROGRESS NOTES
Uyen Aly  : 1945  [place]  Secondary: [address] at [facility], [address] [facility],[address]  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 Frequency/Duration: 2 times a week for 90 Day(s)    Pre-treatment Symptoms/Complaints:  LBP  Pain: Initial:   6/10 Post Session:  5/10   Medications Last Reviewed:  2020  Updated Objective Findings:  See evaluation note from today  TREATMENT:     THERAPEUTIC EXERCISE: (10 minutes):  Exercises per grid below to improve mobility and strength. Required minimal verbal cues to promote proper body alignment and promote proper body posture. Progressed resistance and repetitions as indicated. Date:  20 Date:   Date:     Activity/Exercise Parameters Parameters Parameters   SKTC 3 reps  20 sec holds     Active Hamstring Stretch 3 reps  20 sec holds                                       [employer] Portal  Treatment/Session Summary:    · Response to Treatment:  Pt tolerated all treatments well today with no c/o. · Communication/Consultation:  None today  · Equipment provided today:  None today  · Recommendations/Intent for next treatment session: Next visit will focus on progression of stretching/strengthening exercises as tolerable with modalities and manual therapy to reduce pain.     Total Treatment Billable Duration:  10 minutes  PT Patient Time In/Time Out  Time In: 3114  Time Out: [address] [facility] Nae Darnell PT    Future Appointments   Date Time Provider Janelle Cruz   1/15/2020  1:45 PM Tramaine Verdugo PT [facility]   1/20/2020  1:45 PM Shante Bush, PT [facility]   1/23/2020  1:00 PM Shante Bush, PT [facility] SFE   1/27/2020  1:45 PM Shante Bush, PT [facility] SFE   1/30/2020  1:45 PM Shante Bush, PT [facility]

## 2020-01-15 ENCOUNTER — HOSPITAL ENCOUNTER (OUTPATIENT)
Dept: PHYSICAL THERAPY | Age: 75
Discharge: HOME | End: 2020-01-15
Payer: MEDICARE

## 2020-01-15 PROCEDURE — 97140 MANUAL THERAPY 1/> REGIONS: CPT

## 2020-01-15 PROCEDURE — 97110 THERAPEUTIC EXERCISES: CPT

## 2020-01-15 NOTE — PROGRESS NOTES
Tammy Gregg  : 1945  [place]  Secondary: [address] at [address], [address], [place] [address].  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 Frequency/Duration: 2 times a week for 90 Day(s)    Pre-treatment Symptoms/Complaints:  LBP \"it is about the same\"  Pain: Initial:  4/10 Post Session:  3/10   Medications Last Reviewed:  1/15/2020  Updated Objective Findings:  None Today  TREATMENT:     THERAPEUTIC EXERCISE: (30 minutes):  Exercises per grid below to improve mobility and strength. Required minimal verbal cues to promote proper body alignment and promote proper body posture. Progressed resistance and repetitions as indicated. MANUAL THERAPY: (15 minutes): Soft tissue mobilization was utilized and necessary because of the patient's restricted joint motion, loss of articular motion, restricted motion of soft tissue and paraspinals bilaterally, low back region. Date:  01-15-20 Date:   Date:     Activity/Exercise Parameters Parameters Parameters   SKTC 3 reps  20 sec holds     Active Hamstring Stretch 3 reps  20 sec holds pumps     Lower trunk Rotation 10 x with 3 sec pause     Hip Flexion Isometric 3 x 10 sec holds Bilaterally     Piriformis stretch 3 x 20 sec holds Bilaterally     Pelvic Tilt X 10 reps pause     Bridging X 10 reps pause at top                             Moist Heat following treatment in prone x 10 minutes. Skin clear.     [employer] Portal  Treatment/Session Summary:    · Response to Treatment:  Pt tolerated all treatments well today with no c/o. Patient did well today with added exercises. Tight Paraspinals bilaterally where he can typically get some muscle spasm at times. · Communication/Consultation:  None today  · Equipment provided today:  None today  · Recommendations/Intent for next treatment session: Next visit will focus on progression of stretching/strengthening exercises as tolerable with modalities and manual therapy to reduce pain.     Total Treatment Billable Duration:  45 minutes (plus non billed moist heat)  PT Patient Time In/Time Out  Time In: 1345  Time Out: [facility]    Future Appointments   Date Time Provider Janelle Cruz   1/15/2020  1:45 PM [facility]   1/20/2020  1:45 PM Damian Ware, PT [facility]   1/23/2020  1:00 PM Damian Ware, PT [facility] SFE   1/27/2020  1:45 PM Damian Ware, PT [facility]   1/30/2020  1:45 PM Damian Ware, PT [facility]

## 2020-01-20 ENCOUNTER — HOSPITAL ENCOUNTER (OUTPATIENT)
Dept: PHYSICAL THERAPY | Age: 75
Discharge: HOME | End: 2020-01-20
Payer: MEDICARE

## 2020-01-20 PROCEDURE — 97140 MANUAL THERAPY 1/> REGIONS: CPT

## 2020-01-20 PROCEDURE — 97110 THERAPEUTIC EXERCISES: CPT

## 2020-01-20 NOTE — PROGRESS NOTES
Cooper Omalley  : 1945  [place]  Secondary: [address] at [place] [id number] [facility], [address], [place] [address].  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 Frequency/Duration: 2 times a week for 90 Day(s)    Pre-treatment Symptoms/Complaints:  LBP; \"It's not too bad today. \"  Pain: Initial:  3/10 Post Session:  2/10   Medications Last Reviewed:  2020  Updated Objective Findings:  None Today  TREATMENT:     THERAPEUTIC EXERCISE: (25 minutes):  Exercises per grid below to improve mobility and strength. Required minimal verbal cues to promote proper body alignment and promote proper body posture. Progressed resistance and repetitions as indicated. MANUAL THERAPY: (15 minutes): Soft tissue mobilization was utilized and necessary because of the patient's restricted joint motion, loss of articular motion, restricted motion of soft tissue and paraspinals bilaterally, low back region. MODALITIES: (10 minutes:)  Moist heat to low back in prone x10 minutes to decrease pain and stiffness. Skin clear afterwards.    Date:  01-15-20 Date:  20 Date:     Activity/Exercise Parameters Parameters Parameters   SKTC 3 reps  20 sec holds 3 reps  20 sec holds    Active Hamstring Stretch 3 reps  20 sec holds pumps 3 reps  20 sec holds    Lower trunk Rotation 10 x with 3 sec pause 10 reps  5 sec holds    Hip Flexion Isometric 3 x 10 sec holds Bilaterally 5 reps  10 sec holds    Piriformis stretch 3 x 20 sec holds Bilaterally 3 reps  20 sec holds    Pelvic Tilt X 10 reps pause 15 reps  5 sec holds    Bridging X 10 reps pause at top     NuStep  Level 4  6 minutes    T-band Straight Arm Pulldowns  Green T-band  20 reps                Moist Heat following treatment in prone x 10 minutes. Skin clear. [employer] Portal  Treatment/Session Summary:    · Response to Treatment:  Pt tolerated all treatments well today with no c/o. Pt felt good at end of session today. · Communication/Consultation:  None today  · Equipment provided today:  None today  · Recommendations/Intent for next treatment session: Next visit will focus on progression of stretching/strengthening exercises as tolerable with modalities and manual therapy to reduce pain.     Total Treatment Billable Duration:  40 minutes   PT Patient Time In/Time Out  Time In: 8710  Time Out: Td Galvan PT    Future Appointments   Date Time Provider Janelle Cruz   1/23/2020  1:00 PM Carlos Cruz [facility] LINDA   1/27/2020  1:45 PM Geni Santiago PT [facility] SFE   1/30/2020  1:45 PM [facility]

## 2020-01-23 ENCOUNTER — HOSPITAL ENCOUNTER (OUTPATIENT)
Dept: PHYSICAL THERAPY | Age: 75
Discharge: HOME | End: 2020-01-23
Payer: MEDICARE

## 2020-01-23 PROCEDURE — 97140 MANUAL THERAPY 1/> REGIONS: CPT

## 2020-01-23 PROCEDURE — 97110 THERAPEUTIC EXERCISES: CPT

## 2020-01-23 NOTE — PROGRESS NOTES
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 Frequency/Duration: 2 times a week for 90 Day(s)    Pre-treatment Symptoms/Complaints:  LBP; Pt states he feels his back is improving. Pain: Initial:  3/10 Post Session:  2/10   Medications Last Reviewed:  2020  Updated Objective Findings:  None Today  TREATMENT:     THERAPEUTIC EXERCISE: (25 minutes):  Exercises per grid below to improve mobility and strength. Required minimal verbal cues to promote proper body alignment and promote proper body posture. Progressed resistance and repetitions as indicated. MANUAL THERAPY: (15 minutes): Soft tissue mobilization was utilized and necessary because of the patient's restricted joint motion, loss of articular motion, restricted motion of soft tissue and paraspinals bilaterally, low back region. MODALITIES: (10 minutes:)  Moist heat to low back in prone x10 minutes to decrease pain and stiffness. Skin clear afterwards.    Date:  01-15-20 Date:  20 Date:  20   Activity/Exercise Parameters Parameters Parameters   SKTC 3 reps  20 sec holds 3 reps  20 sec holds 3 reps  20 sec holds   Active Hamstring Stretch 3 reps  20 sec holds pumps 3 reps  20 sec holds 3 reps  20 sec holds   Lower trunk Rotation 10 x with 3 sec pause 10 reps  5 sec holds 10 reps  5 sec holds   Hip Flexion Isometric 3 x 10 sec holds Bilaterally 5 reps  10 sec holds 5 reps  10 sec holds Piriformis stretch 3 x 20 sec holds Bilaterally 3 reps  20 sec holds 3 reps  20 sec holds   Pelvic Tilt X 10 reps pause 15 reps  5 sec holds    Bridging X 10 reps pause at top  10 reps  5 sec holds   NuStep  Level 4  6 minutes    T-band Straight Arm Pulldowns  Green T-band  20 reps    Supine Marching   20 reps         Moist Heat following treatment in prone x 10 minutes. Skin clear. [employer] Portal  Treatment/Session Summary:    · Response to Treatment:  Pt tolerated all treatments well today with no c/o. Pt having some c/o neck pain today of unknown reason. I used the wedge under his upper body which helped with the neck pain. Tried TENS on patients low back, but machine not working today. Will try it next visit. · Communication/Consultation:  None today  · Equipment provided today:  None today  · Recommendations/Intent for next treatment session: Next visit will focus on progression of stretching/strengthening exercises as tolerable with modalities and manual therapy to reduce pain.     Total Treatment Billable Duration:  40 minutes   PT Patient Time In/Time Out  Time In: 1300  Time Out: Lorenzo Hernandez PT    Future Appointments   Date Time Provider Janelle Cruz   1/27/2020  1:45 PM Lalit Jackson PT [facility] LINDA   1/30/2020  1:45 PM MAUREEN Arredondo

## 2020-01-27 ENCOUNTER — HOSPITAL ENCOUNTER (OUTPATIENT)
Dept: PHYSICAL THERAPY | Age: 75
Discharge: HOME | End: 2020-01-27
Payer: MEDICARE

## 2020-01-27 PROCEDURE — 97110 THERAPEUTIC EXERCISES: CPT

## 2020-01-27 PROCEDURE — 97140 MANUAL THERAPY 1/> REGIONS: CPT

## 2020-01-27 PROCEDURE — 97014 ELECTRIC STIMULATION THERAPY: CPT

## 2020-01-27 NOTE — PROGRESS NOTES
Chantelle May  : 1945  [place]  Secondary: [address] at [place] [address] [facility], [address], [place] [address].  [phone]   [phone]         OUTPATIENT PHYSICAL THERAPY: Daily Treatment Note 2020  Visit Count:  5    ICD-10: Treatment Diagnosis: Other intervertebral disc degeneration, lumbar region (M51.36); Other spondylosis, lumbar region (M47.896); Low back pain (M54.5)  Precautions/Allergies:   Sulfa (sulfonamide antibiotics)   TREATMENT PLAN:  Effective Dates: 2020 TO 2020 (90 days). Frequency/Duration: 2 times a week for 90 Day(s)    Pre-treatment Symptoms/Complaints:  LBP; Pt states he played golf this weekend and had some pain, but is feeling better today. Pain: Initial:  3/10 Post Session:  2/10   Medications Last Reviewed:  2020  Updated Objective Findings:  None Today  TREATMENT:     THERAPEUTIC EXERCISE: (20 minutes):  Exercises per grid below to improve mobility and strength. Required minimal verbal cues to promote proper body alignment and promote proper body posture. Progressed resistance and repetitions as indicated. MANUAL THERAPY: (15 minutes): Soft tissue mobilization was utilized and necessary because of the patient's restricted joint motion, loss of articular motion, restricted motion of soft tissue and paraspinals bilaterally, low back region. MODALITIES: (15 minutes:)  Interferential electrical stimulation with moist heat to low back in prone x15 minutes to decrease pain and stiffness. Skin clear afterwards.    Date:  01-15-20 Date:  20 Date:  20 Date:  20   Activity/Exercise Parameters Parameters Parameters    SKTC 3 reps  20 sec holds 3 reps  20 sec holds 3 reps  20 sec holds 3 reps  20 sec holds   Active Hamstring Stretch 3 reps  20 sec holds pumps 3 reps  20 sec holds 3 reps  20 sec holds 3 reps  20 sec holds   Lower trunk Rotation 10 x with 3 sec pause 10 reps  5 sec holds 10 reps  5 sec holds 10 reps  5 sec holds   Hip Flexion Isometric 3 x 10 sec holds Bilaterally 5 reps  10 sec holds 5 reps  10 sec holds 5 reps  10 sec holds   Piriformis stretch 3 x 20 sec holds Bilaterally 3 reps  20 sec holds 3 reps  20 sec holds 3 reps  20 sec holds   Pelvic Tilt X 10 reps pause 15 reps  5 sec holds     Bridging X 10 reps pause at top  10 reps  5 sec holds 10 reps  5 sec holds   NuStep  Level 4  6 minutes  Level 4  6 minutes   T-band Straight Arm Pulldowns  Green T-band  20 reps     Supine Marching   20 reps 20 reps              [employer] Portal  Treatment/Session Summary:    · Response to Treatment:  Pt tolerated all treatments well today with no c/o. Pt reported decreased pain and stiffness following treatments today. · Communication/Consultation:  None today  · Equipment provided today:  None today  · Recommendations/Intent for next treatment session: Next visit will focus on progression of stretching/strengthening exercises as tolerable with modalities and manual therapy to reduce pain.     Total Treatment Billable Duration:  50 minutes   PT Patient Time In/Time Out  Time In: 9394  Time Out: Td Galvan PT    Future Appointments   Date Time Provider Janelle Cruz   1/30/2020  1:45 PM Carlos Martínez [facility] LINDA   2/3/2020  2:30 PM Jose David Camp PT [facility] SFSEGUNDO   2/6/2020  1:00 PM MAUREEN Martínez SEGUNDO

## 2020-01-30 ENCOUNTER — HOSPITAL ENCOUNTER (OUTPATIENT)
Dept: PHYSICAL THERAPY | Age: 75
Discharge: HOME | End: 2020-01-30
Payer: MEDICARE

## 2020-01-30 PROCEDURE — 97014 ELECTRIC STIMULATION THERAPY: CPT

## 2020-01-30 PROCEDURE — 97110 THERAPEUTIC EXERCISES: CPT

## 2020-01-30 PROCEDURE — 97140 MANUAL THERAPY 1/> REGIONS: CPT

## 2020-01-30 NOTE — PROGRESS NOTES
Cooper Omalley  : 1945  [place]  Secondary: [address] at [address], [address] [place] [address],[address] [facility], [place] [address].  [phone]   [phone]         OUTPATIENT PHYSICAL THERAPY: Daily Treatment Note 2020  Visit Count:  6    ICD-10: Treatment Diagnosis: Other intervertebral disc degeneration, lumbar region (M51.36); Other spondylosis, lumbar region (M47.896); Low back pain (M54.5)  Precautions/Allergies:   Sulfa (sulfonamide antibiotics)   TREATMENT PLAN:  Effective Dates: 2020 TO 2020 (90 days). Frequency/Duration: 2 times a week for 90 Day(s)    Pre-treatment Symptoms/Complaints:  LBP; Pt states his back is improving with current treatments of exercise, manual therapy and e-stim. Pain: Initial:  3/10 Post Session:  2/10   Medications Last Reviewed:  2020  Updated Objective Findings:  None Today  TREATMENT:     THERAPEUTIC EXERCISE: (25 minutes):  Exercises per grid below to improve mobility and strength. Required minimal verbal cues to promote proper body alignment and promote proper body posture. Progressed resistance and repetitions as indicated. MANUAL THERAPY: (15 minutes): Soft tissue mobilization was utilized and necessary because of the patient's restricted joint motion, loss of articular motion, restricted motion of soft tissue and paraspinals bilaterally, low back region. MODALITIES: (15 minutes:)  Interferential electrical stimulation with moist heat to low back in prone x15 minutes to decrease pain and stiffness. Skin clear afterwards.    Date:  01-15-20 Date:  20 Date:  20 Date:  20 Date:  20   Activity/Exercise Parameters Parameters Parameters     SKTC 3 reps  20 sec holds 3 reps  20 sec holds 3 reps  20 sec holds 3 reps  20 sec holds 3 reps  20 sec holds   Active Hamstring Stretch 3 reps  20 sec holds pumps 3 reps  20 sec holds 3 reps  20 sec holds 3 reps  20 sec holds 3 reps  20 sec holds   Lower trunk Rotation 10 x with 3 sec pause 10 reps  5 sec holds 10 reps  5 sec holds 10 reps  5 sec holds 10 reps  5 sec holds   Hip Flexion Isometric 3 x 10 sec holds Bilaterally 5 reps  10 sec holds 5 reps  10 sec holds 5 reps  10 sec holds 5 reps  10 sec holds   Piriformis stretch 3 x 20 sec holds Bilaterally 3 reps  20 sec holds 3 reps  20 sec holds 3 reps  20 sec holds 3 reps  20 sec holds   Pelvic Tilt X 10 reps pause 15 reps  5 sec holds      Bridging X 10 reps pause at top  10 reps  5 sec holds 10 reps  5 sec holds    NuStep  Level 4  6 minutes  Level 4  6 minutes Level 4  6 minutes   T-band Straight Arm Pulldowns  Green T-band  20 reps   Green T-band  20 reps   Supine Marching   20 reps 20 reps 20 reps               MedBridge Portal  Treatment/Session Summary:    · Response to Treatment:  Pt tolerated all treatments well today with no c/o. Improved pain and soft tissue mobility today. · Communication/Consultation:  None today  · Equipment provided today:  None today  · Recommendations/Intent for next treatment session: Next visit will focus on progression of stretching/strengthening exercises as tolerable with modalities and manual therapy to reduce pain.     Total Treatment Billable Duration:  55 minutes   PT Patient Time In/Time Out  Time In: 4163  Time Out: 8695 Kevon Christensen PT    Future Appointments   Date Time Provider Janelle Cruz   [facility]2020  2:30 [facility]   2/6/2020  1:00 PM Francisco Harden [facility]

## 2020-02-03 ENCOUNTER — HOSPITAL ENCOUNTER (OUTPATIENT)
Dept: PHYSICAL THERAPY | Age: 75
Discharge: HOME | End: 2020-02-03
Payer: MEDICARE

## 2020-02-03 PROCEDURE — 97110 THERAPEUTIC EXERCISES: CPT

## 2020-02-03 PROCEDURE — 97140 MANUAL THERAPY 1/> REGIONS: CPT

## 2020-02-03 PROCEDURE — 97014 ELECTRIC STIMULATION THERAPY: CPT

## 2020-02-03 NOTE — PROGRESS NOTES
Shahrzad Go  : 1945  [place]  Secondary: [address] at [address], [address] [place] [address],[address] [facility], [place] [address].  [phone]   [phone]         OUTPATIENT PHYSICAL THERAPY: Daily Treatment Note 2/3/2020  Visit Count:  7    ICD-10: Treatment Diagnosis: Other intervertebral disc degeneration, lumbar region (M51.36); Other spondylosis, lumbar region (M47.896); Low back pain (M54.5)  Precautions/Allergies:   Sulfa (sulfonamide antibiotics)   TREATMENT PLAN:  Effective Dates: 2020 TO 2020 (90 days). Frequency/Duration: 2 times a week for 90 Day(s)    Pre-treatment Symptoms/Complaints:  LBP; Pt states he played golf this weekend and his back felt pretty good. Pain: Initial:  3/10 Post Session:  2/10   Medications Last Reviewed:  2/3/2020  Updated Objective Findings:  None Today  TREATMENT:     THERAPEUTIC EXERCISE: (25 minutes):  Exercises per grid below to improve mobility and strength. Required minimal verbal cues to promote proper body alignment and promote proper body posture. Progressed resistance and repetitions as indicated. MANUAL THERAPY: (15 minutes): Soft tissue mobilization was utilized and necessary because of the patient's restricted joint motion, loss of articular motion, restricted motion of soft tissue and paraspinals bilaterally, low back region. MODALITIES: (15 minutes:)  Interferential electrical stimulation with moist heat to low back in prone x15 minutes to decrease pain and stiffness. Skin clear afterwards.    Date:  20 Date:  20 Date:  20   Activity/Exercise      SKTC 3 reps  20 sec holds 3 reps  20 sec holds 3 reps  20 sec holds   Active Hamstring Stretch 3 reps  20 sec holds 3 reps  20 sec holds 3 reps  20 sec holds   Lower trunk Rotation 10 reps  5 sec holds 10 reps  5 sec holds 10 reps  5 sec holds   Hip Flexion Isometric 5 reps  10 sec holds 5 reps  10 sec holds 5 reps  10 sec holds   Piriformis stretch 3 reps  20 sec holds 3 reps  20 sec holds 3 reps  20 sec holds   Pelvic Tilt      Bridging 10 reps  5 sec holds     NuStep Level 4  6 minutes Level 4  6 minutes Level 4  6 minutes   T-band Straight Arm Pulldowns  Green T-band  20 reps Blue T-band  20 reps   Supine Marching 20 reps 20 reps 20 reps             MedBridge Portal  Treatment/Session Summary:    · Response to Treatment:  Pt tolerated all treatments well today with no c/o. Overall pain improving. · Communication/Consultation:  None today  · Equipment provided today:  None today  · Recommendations/Intent for next treatment session: Next visit will focus on progression of stretching/strengthening exercises as tolerable with modalities and manual therapy to reduce pain.     Total Treatment Billable Duration:  55 minutes   PT Patient Time In/Time Out  Time In: 1430  Time Out: 1530  Noah Kaplan PT    Future Appointments   Date Time Provider Janelle Cruz   2/6/2020  1:00 PM Tramaine Verdugo, PT [facility]

## 2020-02-06 ENCOUNTER — HOSPITAL ENCOUNTER (OUTPATIENT)
Dept: PHYSICAL THERAPY | Age: 75
Discharge: HOME | End: 2020-02-06
Payer: MEDICARE

## 2020-02-06 PROCEDURE — 97140 MANUAL THERAPY 1/> REGIONS: CPT

## 2020-02-06 PROCEDURE — 97014 ELECTRIC STIMULATION THERAPY: CPT

## 2020-02-06 PROCEDURE — 97110 THERAPEUTIC EXERCISES: CPT

## 2020-02-06 NOTE — PROGRESS NOTES
Tammy Gregg  : 1945  [place]  Secondary: [address] at [address], [address] [place] [address],[address], [address] [facility]  [phone]   [phone]         OUTPATIENT PHYSICAL THERAPY: Daily Treatment Note 2020  Visit Count:  8    ICD-10: Treatment Diagnosis: Other intervertebral disc degeneration, lumbar region (M51.36); Other spondylosis, lumbar region (M47.896); Low back pain (M54.5)  Precautions/Allergies:   Sulfa (sulfonamide antibiotics)   TREATMENT PLAN:  Effective Dates: 2020 TO 2020 (90 days). Frequency/Duration: 2 times a week for 90 Day(s)    Pre-treatment Symptoms/Complaints:  LBP; Pt states his back is doing well. Pain: Initial:  3/10 Post Session:  2/10   Medications Last Reviewed:  2020  Updated Objective Findings:  None Today  TREATMENT:     THERAPEUTIC EXERCISE: (25 minutes):  Exercises per grid below to improve mobility and strength. Required minimal verbal cues to promote proper body alignment and promote proper body posture. Progressed resistance and repetitions as indicated. MANUAL THERAPY: (15 minutes): Soft tissue mobilization was utilized and necessary because of the patient's restricted joint motion, loss of articular motion, restricted motion of soft tissue and paraspinals bilaterally, low back region. MODALITIES: (15 minutes:)  Interferential electrical stimulation to low back in prone x15 minutes to decrease pain and stiffness. Skin clear afterwards.    Date:  20 Date:  20 Date:  20 Date:  20   Activity/Exercise       SKTC 3 reps  20 sec holds 3 reps  20 sec holds 3 reps  20 sec holds 3 reps  20 sec holds   Active Hamstring Stretch 3 reps  20 sec holds 3 reps  20 sec holds 3 reps  20 sec holds 3 reps  20 sec holds   Lower trunk Rotation 10 reps  5 sec holds 10 reps  5 sec holds 10 reps  5 sec holds 10 reps  5 sec holds   Hip Flexion Isometric 5 reps  10 sec holds 5 reps  10 sec holds 5 reps  10 sec holds 5 reps  10 sec holds   Piriformis stretch 3 reps  20 sec holds 3 reps  20 sec holds 3 reps  20 sec holds 3 reps  20 sec holds   Pelvic Tilt       Bridging 10 reps  5 sec holds      NuStep Level 4  6 minutes Level 4  6 minutes Level 4  6 minutes Level 4  6 minutes   T-band Straight Arm Pulldowns  Green T-band  20 reps Blue T-band  20 reps Blue T-band  20 reps   Supine Marching 20 reps 20 reps 20 reps 20 reps              MedBridge Portal  Treatment/Session Summary:    · Response to Treatment:  Pt tolerated all treatments well today with no c/o. Pain doing well. Pt to continue independent HEP. Discharge from PT.  · Communication/Consultation:  None today  · Equipment provided today:  None today  · Recommendations/Intent for next treatment session: Next visit will focus on progression of stretching/strengthening exercises as tolerable with modalities and manual therapy to reduce pain. Total Treatment Billable Duration:  55 minutes   PT Patient Time In/Time Out  Time In: 0599  Time Out: [address], PT    No future appointments.

## 2020-02-06 NOTE — THERAPY DISCHARGE
Phoebe Wilson : 1945 [place] Secondary: [address] at [address], Suite [facility], [place] [address]. [phone]   [phone] OUTPATIENT PHYSICAL THERAPY:Discharge Summary 2020 ICD-10: Treatment Diagnosis: Other intervertebral disc degeneration, lumbar region (M51.36); Other spondylosis, lumbar region (M47.896); Low back pain (M54.5) Precautions/Allergies:  
Sulfa (sulfonamide antibiotics) TREATMENT PLAN: 
Effective Dates: 2020 TO 2020 (90 days). Frequency/Duration: 2 times a week for 90 Day(s) MEDICAL/REFERRING DIAGNOSIS: 
Other specified dorsopathies, site unspecified [M53.80] Spondylosis without myelopathy or radiculopathy, lumbosacral region [M47.817] Spondylolisthesis, multiple sites in spine [M43.19] Other intervertebral disc degeneration, lumbar region [M51.36] DATE OF ONSET: Approximately 1 year ago REFERRING PHYSICIAN: Amy Koyanagi, MD MD Orders: Evaluate and Treat Return MD Appointment: Pt has no follow up appt at this time INITIAL ASSESSMENT:  Mr. Michael Loaiza was seen for 8 visits of physical therapy from 20 to 20. He was doing much better on last visit. Discharge from PT to independent [facility]. PROBLEM LIST (Impacting functional limitations): 1. Decreased Strength 2. Decreased ADL/Functional Activities 3. Increased Pain 4. Decreased Flexibility/Joint Mobility 5. Decreased [place] with Home Exercise Program INTERVENTIONS PLANNED: (Treatment may consist of any combination of the following) 1. Cold 2. Cryotherapy 3. Electrical Stimulation 4. Heat 5. Home Exercise Program (HEP) 6. Manual Therapy 7. Range of Motion (ROM) 8. Therapeutic Exercise/Strengthening 9. Ultrasound (US) 10. Aquatic Therapy GOALS: (Goals have been discussed and agreed upon with patient.) Short-Term Functional Goals: Time Frame: 4 weeks 1. Pt will increase lumbar ROM flexion = 60 degrees, side bending = 30 degrees bilaterally to assist with household ADL's 2. Pt will increase SLR 50 degrees bilaterally to assist with household ADL's 3. Pt will be independent with HEP Discharge Goals: Time Frame: 12 weeks 1. Pt will increase SLR 60 degrees bilaterally to assist with household ADL's 2. Pt will increase strength bilateral LE's 5/5 to assist with household ADL's 3. Pt will perfrom 20 minutes household cleaning activities independently with min to no c/o LBP 
 
OUTCOME MEASURE:  
Tool Used: Modified Oswestry Low Back Pain Questionnaire Score:  Initial: 12/50  Most Recent: X/50 (Date: -- ) Interpretation of Score: Each section is scored on a 0-5 scale, 5 representing the greatest disability. The scores of each section are added together for a total score of 50. MEDICAL NECESSITY:  
· Patient is expected to demonstrate progress in strength, range of motion and functional technique to increase independence with household ADL's. · Patient demonstrates good rehab potential due to higher previous functional level. REASON FOR SERVICES/OTHER COMMENTS: 
· Patient continues to require skilled intervention due to decreased lumbar ROM, LE strength/flexibility and increased pain leading to decreased functional status. Total Duration: PT Patient Time In/Time Out Time In: 1300 Time Out: 7011 Rehabilitation Potential For Stated Goals: Good Regarding Marquez Siddiqui's therapy, I certify that the treatment plan above will be carried out by a therapist or under their direction. Thank you for this referral, 
Samuel Gomes, PT Referring Physician Signature: Wan Olmstead MD _______________________________ Date _____________ PAIN/SUBJECTIVE:  
Initial:   6 Post Session:  5/10 HISTORY:  
History of Injury/Illness (Reason for Referral): 
Pt reports insidious onset bilateral low back pain of approximately 1 year duration. 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 He had an ELEAZAR 12-19-19. He denies any long term relief from the injection. Pt reports marked difficulties standing to wash dishes and perform household ADL's due to his LBP. Aggravating factors include standing. Relieving factors include sitting in a recliner and sitting in the car. He states Dr Cali Williamson told him he is not a surgical candidate. Past Medical History/Comorbidities: 
Mr. Clayton Caceres  has a past medical history of Abdominal aneurysm without mention of rupture ([facility] [address] (9/30/2015), Benign localized hyperplasia of prostate without urinary obstruction and other lower urinary tract symptoms (LUTS) (9/30/2015), Bladder neck obstruction (9/30/2015), Cancer ([address] (02/2017), Gastrointestinal disorder, H/O testicular mass (9/30/2015), Hypertension, Inguinal hernia with obstruction, without mention of gangrene, recurrent unilateral or unspecified (9/30/2015), Other and unspecified hyperlipidemia (9/30/2015), Other ill-defined conditions, and Renal insufficiency (9/30/2015). Mr. Clayton Caceres  has a past surgical history that includes hx other surgical; hx orthopaedic (Right); hx orthopaedic (Right); and hx other surgical (Right, 02/2017). Social History/Living Environment:  
  Pt lives in a one story house with one step to enter. He denies difficulties ascending/descending stairs due to his back pain Prior Level of Function/Work/Activity: 
Pt is retired Dominant Side:  
      RIGHT Other Clinical Tests: MRI lumbar spine ( see above) Personal Factors:   
      Sex:  male Age:  76 y.o. Profession:  Pt is retired Ambulatory/Rehab Services H2 Model Falls Risk Assessment Risk Factors: 
     (1)  Gender [Male] Ability to Rise from Chair: 
     (0)  Ability to rise in a single movement Falls Prevention Plan: No modifications necessary Total: (5 or greater = High Risk): 1 ©2010 [facility] Patent #[id number]. Federal Law prohibits the replication, distribution or use without written permission from [facility] Current Medications:   
  
Current Outpatient Medications:  
  amLODIPine (NORVASC) 2.5 mg tablet, Take 7.5 mg by mouth daily. , Disp: , Rfl:  
  atorvastatin (LIPITOR) 40 mg tablet, Take  by mouth daily. 1-1/2 tablet daily, Disp: , Rfl:  
  finasteride (PROSCAR) 5 mg tablet, Take 5 mg by mouth daily. , Disp: , Rfl:   Aspirin, Buffered 81 mg tab, Take 1 Tab by mouth daily. Takes every other day, Disp: , Rfl:  
  tamsulosin (FLOMAX) 0.4 mg capsule, Take 0.4 mg by mouth daily. , Disp: , Rfl:  
  omeprazole (PRILOSEC) 10 mg capsule, Take 10 mg by mouth daily. , Disp: , Rfl:   
Date Last Reviewed:  01-13-20 Number of Personal Factors/Comorbidities that affect the Plan of Care: 1-2: MODERATE COMPLEXITY EXAMINATION:  
Observation/Orthostatic Postural Assessment: Forward head, rounded shoulders; decreased lumbar lordosis Palpation:   
      Tenderness bilateral thoracolumbar paraspinals and bilateral QL 
ROM:   
Lumbar Flexion = 45 degrees Lumbar Extension = 20 degrees Lumbar Side Bending R = 22 degrees Lumbar Side Bending L = 24 degrees Strength:   
R hip flexion = 4+/5 R hip abduction = 4+/5 R hip adduction = 5/5 R knee extension = 5/5 R knee flexion = 5/5 
R ankle dorsiflexion = 5/5 
R ankle plantarflexion = 5/5 L hip flexion = 4+/5 L hip abduction = 4+/5 L hip adduction = 5/5 L knee extension = 5/5 L knee flexion = 5/5 L ankle dorsiflexion = 5/5 L ankle plantarflexion = 5/5 Special Tests: SLR 40 degrees with marked hamstring tightness noted bilaterally Neurological Screen: Myotomes:  No significant deficits noted Dermatomes:  Sensation intact to light touch bilateral LE's Reflexes:  DTR's 2+ to bilateral patellar and achilles Functional Mobility:  
      Gait/Ambulation:  No significant deficits noted Transfers:  Pt able to transition sit to supine and supine to sit independently Body Structures Involved: 1. Bones 2. Joints 3. Muscles Body Functions Affected: 1. Sensory/Pain 2. Neuromusculoskeletal Activities and Participation Affected: 1. Mobility 2. Domestic Life Number of elements (examined above) that affect the Plan of Care: 3: MODERATE COMPLEXITY CLINICAL PRESENTATION:  
Presentation: Evolving clinical presentation with changing clinical characteristics: MODERATE COMPLEXITY CLINICAL DECISION MAKING:  
Use of outcome tool(s) and clinical judgement create a POC that gives a: Questionable prediction of patient's progress: MODERATE COMPLEXITY

## 2022-07-05 LAB
AVERAGE GLUCOSE: ABNORMAL
HBA1C MFR BLD: 5.8 %
PROSTATE SPECIFIC ANTIGEN: 5.75 NG/ML

## 2022-08-30 LAB — PSA, ULTRASENSITIVE: 6.23

## 2022-09-27 NOTE — PROGRESS NOTES
Received [facility] referral/auth for Oncology  [id number]  Dates: 9.2.22 - 3-1.23

## 2022-09-29 DIAGNOSIS — R97.20 ELEVATED PSA: Primary | ICD-10-CM

## 2022-09-29 NOTE — PROGRESS NOTES
07/05/2022       POTASSIUM [MOLES/VOLUME] IN SERUM OR PLASMA 3.9  3.5 - 5.1 07/05/2022       CHLORIDE [MOLES/VOLUME] IN SERUM OR PLASMA 102  98 - 107 07/05/2022       CARBON DIOXIDE, TOTAL [MOLES/VOLUME] IN SERUM OR PLASMA 28  22 - 29 07/05/2022       ANION GAP 3 IN SERUM OR PLASMA 11  2 - 14 07/05/2022       GLUCOSE [MASS/VOLUME] IN SERUM OR PLASMA 122  70 - 99 07/05/2022 H     CREATININE [MASS/VOLUME] IN SERUM OR PLASMA 1.7  0.5 - 1.3 07/05/2022 H     CALCIUM [MASS/VOLUME] IN SERUM OR PLASMA 9.7  8.4 - 10.2 07/05/2022       ALANINE AMINOTRANSFERASE [ENZYMATIC ACTIVITY/VOLUME] IN SERUM OR PLASMA 23  0 - 55 07/05/2022       GLOMERULAR FILTRATION RATE/1.73 SQ M. PREDICTED [VOLUME RATE/AREA] IN SERUM OR PLASMA BY CREATININE-BASED FORMULA (MDRD) 41    07/05/2022       UREA NITROGEN [MASS/VOLUME] IN SERUM OR PLASMA 22  7 - 26 07/05/2022       HEMOGLOBIN A1C/HEMOGLOBIN. TOTAL IN BLOOD BY HPLC 5.8  4.2 - 5.6 07/05/2022 H       IMAGING: N/A
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
## 2022-09-30 ENCOUNTER — OFFICE VISIT (OUTPATIENT)
Dept: ONCOLOGY | Age: 77
End: 2022-09-30
Payer: MEDICARE

## 2022-09-30 ENCOUNTER — HOSPITAL ENCOUNTER (OUTPATIENT)
Dept: LAB | Age: 77
Discharge: HOME | End: 2022-10-03
Payer: MEDICARE

## 2022-09-30 VITALS
WEIGHT: 180 LBS | RESPIRATION RATE: 20 BRPM | SYSTOLIC BLOOD PRESSURE: 123 MMHG | DIASTOLIC BLOOD PRESSURE: 72 MMHG | OXYGEN SATURATION: 97 % | HEIGHT: 70 IN | TEMPERATURE: 97.5 F | HEART RATE: 76 BPM | BODY MASS INDEX: 25.77 KG/M2

## 2022-09-30 DIAGNOSIS — R97.20 ELEVATED PSA: ICD-10-CM

## 2022-09-30 DIAGNOSIS — R97.20 ELEVATED PSA: Primary | ICD-10-CM

## 2022-09-30 LAB — PSA SERPL-MCNC: 6.8 NG/ML

## 2022-09-30 PROCEDURE — 99204 OFFICE O/P NEW MOD 45 MIN: CPT | Performed by: UROLOGY

## 2022-09-30 PROCEDURE — 1123F ACP DISCUSS/DSCN MKR DOCD: CPT | Performed by: UROLOGY

## 2022-09-30 PROCEDURE — 84153 ASSAY OF PSA TOTAL: CPT

## 2022-09-30 PROCEDURE — 36415 COLL VENOUS BLD VENIPUNCTURE: CPT

## 2022-09-30 RX ORDER — FAMOTIDINE 20 MG/1
40 TABLET, FILM COATED ORAL DAILY
COMMUNITY
Start: 2022-01-28

## 2022-09-30 RX ORDER — CHLORTHALIDONE 25 MG/1
12.5 TABLET ORAL DAILY
COMMUNITY
Start: 2022-05-27

## 2022-09-30 ASSESSMENT — PATIENT HEALTH QUESTIONNAIRE - PHQ9
1. LITTLE INTEREST OR PLEASURE IN DOING THINGS: 0
2. FEELING DOWN, DEPRESSED OR HOPELESS: 0
SUM OF ALL RESPONSES TO PHQ9 QUESTIONS 1 & 2: 0
SUM OF ALL RESPONSES TO PHQ QUESTIONS 1-9: 0

## 2022-09-30 ASSESSMENT — ENCOUNTER SYMPTOMS
RESPIRATORY NEGATIVE: 1
GASTROINTESTINAL NEGATIVE: 1

## 2022-09-30 NOTE — PATIENT INSTRUCTIONS
Patient Instructions from Today's Visit    Reason for Visit:  New Patient, elevated PSA     Diagnosis Information:  [email or web address]-us/[email or web address]-answers-[email or web address]-education-[email or web address]-uxrcdxp-[email or web address]-sheets      Plan: We are re checking your psa today  There are multiple reasons that could cause your PSA to elevated. Trauma, infection and inflammation are the most common. Of course Prostate Cancer is a possibility, however it is not the most common. With you being on the finasteride, we have to consider that your PSA is actually about double what the labs are showing. We recommend a MRI of the prostate   Follow Up:      Recent Lab Results:  No results found for this or any previous visit. Treatment Summary has been discussed and given to patient: N/A        -------------------------------------------------------------------------------------------------------------------    Patient does express an interest in My Chart. My Chart log in information explained on the after visit summary printout at the [address] [facility] [address] desk.     LOIDA Salvador

## 2022-10-03 ENCOUNTER — TELEPHONE (OUTPATIENT)
Dept: ONCOLOGY | Age: 77
End: 2022-10-03

## 2022-10-03 DIAGNOSIS — R97.20 ELEVATED PSA: Primary | ICD-10-CM

## 2022-10-03 RX ORDER — LORAZEPAM 1 MG/1
1 TABLET ORAL EVERY 6 HOURS PRN
Qty: 1 TABLET | Refills: 0 | Status: SHIPPED | OUTPATIENT
Start: 2022-10-03 | End: 2022-10-06

## 2022-10-03 NOTE — TELEPHONE ENCOUNTER
Call back to patient letting him know that Ativan 1 mg will be sent in to patients pharmacy to take prior to scan. Explained to patient that because he is taking this medication for scan, he will need a . Patient verbalized understanding and will call office back with any other questions or concerns.

## 2022-10-03 NOTE — TELEPHONE ENCOUNTER
Patient called because he is claustrophobic and needs a valium or something to take prior to his MRI. Please call patient back.

## 2022-10-13 ENCOUNTER — HOSPITAL ENCOUNTER (OUTPATIENT)
Dept: MRI IMAGING | Age: 77
Discharge: HOME | End: 2022-10-16
Payer: MEDICARE

## 2022-10-13 DIAGNOSIS — R97.20 ELEVATED PSA: ICD-10-CM

## 2022-10-13 PROCEDURE — A9579 GAD-BASE MR CONTRAST NOS,1ML: HCPCS | Performed by: UROLOGY

## 2022-10-13 PROCEDURE — 72197 MRI PELVIS W/O & W/DYE: CPT

## 2022-10-13 PROCEDURE — 6360000004 HC RX CONTRAST MEDICATION: Performed by: UROLOGY

## 2022-10-13 RX ORDER — SODIUM CHLORIDE 0.9 % (FLUSH) 0.9 %
30 SYRINGE (ML) INJECTION AS NEEDED
Status: DISCONTINUED | OUTPATIENT
Start: 2022-10-13 | End: 2022-10-17 | Stop reason: HOSPADM

## 2022-10-13 RX ADMIN — GADOTERIDOL 17 ML: 279.3 INJECTION, SOLUTION INTRAVENOUS at 12:34
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
## 2022-10-14 ENCOUNTER — OFFICE VISIT (OUTPATIENT)
Dept: ONCOLOGY | Age: 77
End: 2022-10-14
Payer: MEDICARE

## 2022-10-14 VITALS
WEIGHT: 179.3 LBS | RESPIRATION RATE: 21 BRPM | TEMPERATURE: 97.4 F | OXYGEN SATURATION: 98 % | HEIGHT: 70 IN | BODY MASS INDEX: 25.67 KG/M2 | SYSTOLIC BLOOD PRESSURE: 140 MMHG | HEART RATE: 71 BPM | DIASTOLIC BLOOD PRESSURE: 79 MMHG

## 2022-10-14 DIAGNOSIS — R97.20 ELEVATED PSA: Primary | ICD-10-CM

## 2022-10-14 PROCEDURE — G8484 FLU IMMUNIZE NO ADMIN: HCPCS | Performed by: UROLOGY

## 2022-10-14 PROCEDURE — 99214 OFFICE O/P EST MOD 30 MIN: CPT | Performed by: UROLOGY

## 2022-10-14 PROCEDURE — G8417 CALC BMI ABV UP PARAM F/U: HCPCS | Performed by: UROLOGY

## 2022-10-14 PROCEDURE — G8427 DOCREV CUR MEDS BY ELIG CLIN: HCPCS | Performed by: UROLOGY

## 2022-10-14 PROCEDURE — 1123F ACP DISCUSS/DSCN MKR DOCD: CPT | Performed by: UROLOGY

## 2022-10-14 PROCEDURE — 4004F PT TOBACCO SCREEN RCVD TLK: CPT | Performed by: UROLOGY

## 2022-10-14 RX ORDER — CIPROFLOXACIN 500 MG/1
TABLET, FILM COATED ORAL
Qty: 2 TABLET | Refills: 0 | Status: SHIPPED | OUTPATIENT
Start: 2022-10-14

## 2022-10-14 ASSESSMENT — PATIENT HEALTH QUESTIONNAIRE - PHQ9
SUM OF ALL RESPONSES TO PHQ9 QUESTIONS 1 & 2: 0
1. LITTLE INTEREST OR PLEASURE IN DOING THINGS: 0
SUM OF ALL RESPONSES TO PHQ QUESTIONS 1-9: 0
2. FEELING DOWN, DEPRESSED OR HOPELESS: 0

## 2022-10-14 ASSESSMENT — ENCOUNTER SYMPTOMS
RESPIRATORY NEGATIVE: 1
GASTROINTESTINAL NEGATIVE: 1

## 2022-10-14 NOTE — PATIENT INSTRUCTIONS
Patient Instructions from Today's Visit    Reason for Visit:  Follow up     Diagnosis Information:  [email or web address] net/about-us/asco-answers-patient-education-materials/ukqb-mhjwahi-zcgv-sheets      Plan: Your MRI does show an area of concern. We would like to proceed with a biopsy of the prostate to determine if this is prostate cancer, and if it is aggressive. This is an outpatient procedure. Blood in your urine and stool for the first few days is normal. Blood in your ejaculate for the first few months is also normal.  We will plan to see you back 10-14 days after the biopsy to go over the results. Information on the procedure is included in this printout     Follow Up: With the biopsy    Recent Lab Results:  N/a    Treatment Summary has been discussed and given to patient:   N/a      -------------------------------------------------------------------------------------------------------------------    Patient does express an interest in My Chart. My Chart log in information explained on the after visit summary printout at the [facility] [address] [facility] [address] desk. LOIDA Velasco      PRE-PROSTATE BIOPSY INFORMATION     The procedure involves an ultrasound probe which will be inserted into your rectum to visualize the prostate. A needle with lidocaine will be injected to numb the prostate gland. Once the gland is numb they will insert a biopsy needle to get multiple samples from your prostate gland. A pathologist will check the samples for cancer. The results from the pathologist can take 10-14 days to be finalized. If you haven't heard back in a reasonable time please call. Before your biopsy:    -You will be given antibiotics prior to your biopsy to help decrease the risk of infection. Be sure and call your doctor or come to the Emergency Department if you experience fever or chills after your biopsy.    -The day of your biopsy eat and drink normally. It is best to be well hydrated for the biopsy.  You will NOT need to do any bowel prep before the biopsy.    -We will need a urine sample when you check in for your appointment.     -You will need to be off of all major blood thinners, vitamins/herbal medications and anti-inflammatory medications 5 days before your biopsy to prevent any major bleeding. Please see enclosed list or call our line with any medication related questions.     -You do not need a  home, though one might be nice. If you require anti-anxiety medication (i.e. valium, lorazepam) to help through your biopsy, a  will be required. Urology Office #:  [phone]      BIOPSY INSTRUCTIONS / BLOOD THINNERS    IF YOU HAVE ANY HEART ISSUES, HISTORY OF BLOOD CLOTS, STROKE OR OTHER MEDICAL CONDITIONS THAT REQUIRE YOU TO BE ON BLOOD THINNING AGENTS, PLEASE ALERT OUR STAFF. YOU ALSO MAY NEED TO CONTACT YOUR PRESCRIBING PHYSICIAN / CADIOLOGIST FOR THEIR RECOMMENDATION REGARDING THESE MEDICATIONS. ANTI-INFLAMMATORY DRUGS: (SHOULD BE STOPPED 5 DAYS) If you are taking these for a specific medical condition, please alert office for a possible alternative medication         Some examples: Advil, Aleve, Ansaid, Anaprox, Clinoril, Daypro, Feldene, Ibuprofen, Indocin, Midol, Motrin, Nalfon, Naprosyn, Orudis, Ponstil, Tolectin, Toradol    IF YOUR ARE CURRENTLY ON COUMADIN (WARFARIN), PERSANTINE, PLAVIX, HEPARIN, LOVENOX, XARELTO, ELIQUIS OR TICLID:  Please discuss the use of these medications with your doctor as when to discontinue/continue use of these medications. VITAMINS & HERBAL MEDICATIONS: (STOP 5 DAYS PRIOR TO BIOPSY)    ASPIRIN PRODUCTS: Do not need to be discontinued. If you are taking a full dose 325 mg aspirin, change to 81 mg baby aspirin 5 days prior to surgery.       Some examples: Tere Monroe, Ascriptin, Bufferin, [place] Tablets, Ecotrin, Empirin, Equagesic, Excedrin, Florinol, Four way cold tablets, Midol, Norgesic, Nuprin, Pamprin, Synalgos, Triaminicin, Vanquish, Zorprin        Post Biopsy Expectations/Instructions: It is normal for blood to be in your urine and stool for the first 48 hours after the procedure. Blood can be in the ejaculate for up to 6 weeks. It will be a rust color - this is completely normal.   Watch for urinary retention, as this can occur due to clots. If you experience a temperature of 101.5 or greater accompanied by chills, body aches please go back to ED to be evaluated.

## 2022-10-17 ENCOUNTER — PREP FOR PROCEDURE (OUTPATIENT)
Dept: ONCOLOGY | Age: 77
End: 2022-10-17

## 2022-10-18 PROBLEM — R97.20 ELEVATED PROSTATE SPECIFIC ANTIGEN (PSA): Status: ACTIVE | Noted: 2022-10-18

## 2022-11-02 ENCOUNTER — TELEPHONE (OUTPATIENT)
Dept: ONCOLOGY | Age: 77
End: 2022-11-02

## 2022-11-02 NOTE — TELEPHONE ENCOUNTER
I sent a msg to [facility]. It is ok for the patient to rec both flu and Covid Vac.  Call to the patient and heVerbalizes understanding of instructions

## 2022-11-02 NOTE — TELEPHONE ENCOUNTER
Pt is scheduled for biopsy with Dr Yuliana Siddiqui next week and he asks if he can get his flu shot and covid booster prior to the procedure.

## 2022-11-04 ENCOUNTER — HOSPITAL ENCOUNTER (OUTPATIENT)
Dept: GENERAL RADIOLOGY | Age: 77
Discharge: HOME | End: 2022-11-07

## 2022-11-04 ENCOUNTER — OFFICE VISIT (OUTPATIENT)
Dept: INTERNAL MEDICINE CLINIC | Facility: CLINIC | Age: 77
End: 2022-11-04
Payer: MEDICARE

## 2022-11-04 VITALS
WEIGHT: 175.5 LBS | HEIGHT: 70 IN | DIASTOLIC BLOOD PRESSURE: 70 MMHG | BODY MASS INDEX: 25.13 KG/M2 | SYSTOLIC BLOOD PRESSURE: 110 MMHG

## 2022-11-04 DIAGNOSIS — N18.32 STAGE 3B CHRONIC KIDNEY DISEASE (HCC): ICD-10-CM

## 2022-11-04 DIAGNOSIS — Z85.828 HISTORY OF BASAL CELL CARCINOMA (BCC): ICD-10-CM

## 2022-11-04 DIAGNOSIS — M51.36 DDD (DEGENERATIVE DISC DISEASE), LUMBAR: ICD-10-CM

## 2022-11-04 DIAGNOSIS — M54.31 SCIATICA OF RIGHT SIDE: Primary | ICD-10-CM

## 2022-11-04 DIAGNOSIS — Z23 NEED FOR INFLUENZA VACCINATION: ICD-10-CM

## 2022-11-04 DIAGNOSIS — D75.89 MACROCYTOSIS: ICD-10-CM

## 2022-11-04 DIAGNOSIS — Z85.89 HISTORY OF SQUAMOUS CELL CARCINOMA: ICD-10-CM

## 2022-11-04 DIAGNOSIS — K21.9 GASTROESOPHAGEAL REFLUX DISEASE, UNSPECIFIED WHETHER ESOPHAGITIS PRESENT: ICD-10-CM

## 2022-11-04 DIAGNOSIS — M15.9 PRIMARY OSTEOARTHRITIS INVOLVING MULTIPLE JOINTS: ICD-10-CM

## 2022-11-04 DIAGNOSIS — I71.40 ABDOMINAL AORTIC ANEURYSM (AAA) 3.0 CM TO 5.5 CM IN DIAMETER IN MALE: ICD-10-CM

## 2022-11-04 DIAGNOSIS — I10 PRIMARY HYPERTENSION: ICD-10-CM

## 2022-11-04 DIAGNOSIS — R73.03 PREDIABETES: ICD-10-CM

## 2022-11-04 DIAGNOSIS — M54.31 SCIATICA OF RIGHT SIDE: ICD-10-CM

## 2022-11-04 DIAGNOSIS — E78.00 PURE HYPERCHOLESTEROLEMIA: ICD-10-CM

## 2022-11-04 DIAGNOSIS — R97.20 ELEVATED PSA: ICD-10-CM

## 2022-11-04 DIAGNOSIS — Z86.2 HISTORY OF ANEMIA: ICD-10-CM

## 2022-11-04 DIAGNOSIS — Z85.820 HISTORY OF MELANOMA: ICD-10-CM

## 2022-11-04 PROBLEM — M15.0 PRIMARY OSTEOARTHRITIS INVOLVING MULTIPLE JOINTS: Status: ACTIVE | Noted: 2022-11-04

## 2022-11-04 PROBLEM — M51.369 DDD (DEGENERATIVE DISC DISEASE), LUMBAR: Status: ACTIVE | Noted: 2022-11-04

## 2022-11-04 PROCEDURE — G8484 FLU IMMUNIZE NO ADMIN: HCPCS | Performed by: NURSE PRACTITIONER

## 2022-11-04 PROCEDURE — G0008 ADMIN INFLUENZA VIRUS VAC: HCPCS | Performed by: NURSE PRACTITIONER

## 2022-11-04 PROCEDURE — 3074F SYST BP LT 130 MM HG: CPT | Performed by: NURSE PRACTITIONER

## 2022-11-04 PROCEDURE — G8427 DOCREV CUR MEDS BY ELIG CLIN: HCPCS | Performed by: NURSE PRACTITIONER

## 2022-11-04 PROCEDURE — 99205 OFFICE O/P NEW HI 60 MIN: CPT | Performed by: NURSE PRACTITIONER

## 2022-11-04 PROCEDURE — 4004F PT TOBACCO SCREEN RCVD TLK: CPT | Performed by: NURSE PRACTITIONER

## 2022-11-04 PROCEDURE — G8417 CALC BMI ABV UP PARAM F/U: HCPCS | Performed by: NURSE PRACTITIONER

## 2022-11-04 PROCEDURE — 1123F ACP DISCUSS/DSCN MKR DOCD: CPT | Performed by: NURSE PRACTITIONER

## 2022-11-04 PROCEDURE — 3078F DIAST BP <80 MM HG: CPT | Performed by: NURSE PRACTITIONER

## 2022-11-04 PROCEDURE — 90694 VACC AIIV4 NO PRSRV 0.5ML IM: CPT | Performed by: NURSE PRACTITIONER

## 2022-11-04 RX ORDER — CHOLECALCIFEROL (VITAMIN D3) 25 MCG
1000 CAPSULE ORAL DAILY
COMMUNITY

## 2022-11-04 RX ORDER — PREDNISONE 5 MG/1
TABLET ORAL
Qty: 1 EACH | Refills: 0 | Status: SHIPPED | OUTPATIENT
Start: 2022-11-04

## 2022-11-04 RX ORDER — DIAPER,BRIEF,INFANT-TODD,DISP
EACH MISCELLANEOUS
COMMUNITY
Start: 2022-06-27

## 2022-11-04 RX ORDER — ACETAMINOPHEN 500 MG
1000 TABLET ORAL EVERY 4 HOURS PRN
COMMUNITY
Start: 2022-01-28

## 2022-11-04 RX ORDER — ATORVASTATIN CALCIUM 80 MG/1
80 TABLET, FILM COATED ORAL DAILY
COMMUNITY

## 2022-11-04 ASSESSMENT — ENCOUNTER SYMPTOMS
BACK PAIN: 1
SHORTNESS OF BREATH: 0

## 2022-11-04 ASSESSMENT — PATIENT HEALTH QUESTIONNAIRE - PHQ9
SUM OF ALL RESPONSES TO PHQ QUESTIONS 1-9: 0
SUM OF ALL RESPONSES TO PHQ QUESTIONS 1-9: 0
SUM OF ALL RESPONSES TO PHQ9 QUESTIONS 1 & 2: 0
SUM OF ALL RESPONSES TO PHQ QUESTIONS 1-9: 0
2. FEELING DOWN, DEPRESSED OR HOPELESS: 0
SUM OF ALL RESPONSES TO PHQ QUESTIONS 1-9: 0
1. LITTLE INTEREST OR PLEASURE IN DOING THINGS: 0

## 2022-11-04 NOTE — PROGRESS NOTES
PROGRESS NOTE      Chief Complaint   Patient presents with    Establish Care     Pt here to get established        HPI    New patient: Wife is patient Antonella Estevez). Previously [address] [facility]. Right hip pain: Ongoing. [facility]. Evaluation scheduled with Dr Soledad Quezada in January. Right leg radicular pain and lumbar DDD: MRI 5/21 per Dr Jb Yeung. History of epidural injections and some benefit. Recently more bothersome prompting xray of hip and advised follow up with ortho. Hypertension: Chlorthalidone 12.5mg with good control. Monitors occasionally at home with normal readings. Hyperlipidemia: atorvastatin 80 mg for years with compliance and tolerance. Nuclear medicine test 2021    Renal insufficiency: GFR per labs 7/22 41 and Scr 1.7. Occasional OTC Aleve    Elevated PSA with history of BPH symptoms: [facility] Jaylon Kelsey for elevated PSA (calculated for finasteride use if estimated to be 12). MRI of prostate 10/13/22 revealed 1 cm PI-RADS 4 lesion at the left base peripheral zone and scheduled for biopsy next week    Prediabetes: A1C 5.8%    History of melanoma: 4-5 years ago now with annual visit with Dr Nani Greco  History of basal cell and squamous cell carcinoma. GERD and hiatal hernia: Pepcid for years with good control. History of colon polyps: No further follow up scheduled. Aortic aneurysm: Followed annually in February - no change in size    Needs flu shot      Past Medical History, Past Surgical History, Family history, Social History, and Medications were all reviewed and updated as necessary.      Current Outpatient Medications   Medication Sig Dispense Refill    Cholecalciferol (VITAMIN D-3) 25 MCG (1000 UT) CAPS Take 1,000 Units by mouth daily      acetaminophen (TYLENOL) 500 MG tablet Take 1,000 mg by mouth every 4 hours as needed      hydrocortisone 1 % cream APPLY THIN FILM TO AFFECTED AREA TWICE A DAY ---EXTERNAL USE ONLY      atorvastatin (LIPITOR) 80 MG tablet Take 80 mg by mouth daily      predniSONE 5 MG (21) TBPK As directed 1 each 0    ciprofloxacin (CIPRO) 500 MG tablet Please take the first tablet the morning of the procedure. Take the second tablet after the procedure, prior to going to bed. 2 tablet 0    chlorthalidone (HYGROTON) 25 MG tablet Take 12.5 mg by mouth daily      famotidine (PEPCID) 20 MG tablet Take 40 mg by mouth daily      finasteride (PROSCAR) 5 MG tablet Take 5 mg by mouth daily      tamsulosin (FLOMAX) 0.4 MG capsule Take 0.4 mg by mouth daily       No current facility-administered medications for this visit. Allergies   Allergen Reactions    Sulfa Antibiotics Other (See Comments)     headaches       ASSESSMENT and Israel Stahlmike was seen today for establish care. Diagnoses and all orders for this visit:    Sciatica of right side  -     predniSONE 5 MG (21) TBPK; As directed  -     XR LUMBAR SPINE (MIN 4 VIEWS); Future    DDD (degenerative disc disease), lumbar  -     XR LUMBAR SPINE (MIN 4 VIEWS); Future    Primary hypertension  -     Comprehensive Metabolic Panel; Future    Pure hypercholesterolemia    Abdominal aortic aneurysm (AAA) 3.0 cm to 5.5 cm in diameter in male    Stage 3b chronic kidney disease (HCC)    Prediabetes  -     Hemoglobin A1C; Future    Elevated PSA    Gastroesophageal reflux disease, unspecified whether esophagitis present    Need for influenza vaccination  -     Influenza, FLUAD, (age 72 y+), IM, Preservative Free, 0.5 mL    History of melanoma    History of basal cell carcinoma (BCC)    History of squamous cell carcinoma    Primary osteoarthritis involving multiple joints    History of anemia  -     CBC with Auto Differential; Future    Macrocytosis  -     CBC with Auto Differential; Future  -     Vitamin B12; Future  -     Folate; Future    [facility]  Xray today lspine. Trial of prednisone taper. Medications and side effects reviewed.  Follow up scheduled with Dr Jordan Ramesh this month  FU with dermatology scheduled  Will have annual follow up at [facility]. Return for fasting labs    Medical problems and test results were reviewed with the patient today. FOLLOW UP    Return for will need nonfasting labs in 1-2 weeks. REVIEW OF SYSTEMS    Review of Systems   Constitutional:  Negative for unexpected weight change. Eyes:  Negative for visual disturbance. Respiratory:  Negative for shortness of breath. Cardiovascular:  Negative for chest pain, palpitations and leg swelling. Musculoskeletal:  Positive for back pain. Neurological:  Positive for numbness. Negative for headaches. Psychiatric/Behavioral:  Positive for sleep disturbance. Negative for dysphoric mood. The patient is not nervous/anxious. PHYSICAL EXAM    /70   Ht 5' 10\" (1.778 m)   Wt 175 lb 8 oz (79.6 kg)   BMI 25.18 kg/m²      Physical Exam  Vitals and nursing note reviewed. Constitutional:       Appearance: Normal appearance. He is not ill-appearing. Cardiovascular:      Rate and Rhythm: Normal rate and regular rhythm. Pulmonary:      Effort: Pulmonary effort is normal.      Breath sounds: Normal breath sounds. Musculoskeletal:      Lumbar back: Tenderness and bony tenderness present. Normal range of motion. Negative right straight leg raise test and negative left straight leg raise test.      Right lower leg: No edema. Left lower leg: No edema. Neurological:      Mental Status: He is alert.    Psychiatric:         Mood and Affect: Mood normal.         Behavior: Behavior normal.

## 2022-11-09 ENCOUNTER — ANESTHESIA EVENT (OUTPATIENT)
Dept: SURGERY | Age: 77
End: 2022-11-09
Payer: MEDICARE

## 2022-11-09 RX ORDER — BUDESONIDE AND FORMOTEROL FUMARATE DIHYDRATE 80; 4.5 UG/1; UG/1
2 AEROSOL RESPIRATORY (INHALATION) 2 TIMES DAILY
COMMUNITY

## 2022-11-09 NOTE — PERIOP NOTE
Dear  Nina Garay,      Thank you for completing your phone assessment with me today. Here are your requested surgery instructions. Please call #[phone] with any questions/concerns. Your surgery is scheduled at [facility]: [address], [place], [address]. Please arrive at MAIN Entrance. The Pre-op department (#[phone]) will call you on the business day before your surgery with your arrival time. If you have any questions on the day of surgery, please call the pre-op dept. at the telephone number above. If you are sick the day of surgery: fever >100 deg F, coughing up colored mucus, or have abdominal sickness (intractable nausea, vomiting or diarrhea) please call [phone] the day of surgery as early as possible and speak to a nurse about your symptoms. They will advise you on next steps. No food or drink after midnight which includes any gum, mints, candy, or ice chips. Please take these medications on the morning of surgery with a small sip of water: famotidine, finasteride, flomax. On the day before surgery take Acetaminophen 1000mg in the morning and at bedtime OR Acetaminophen 650mg in the morning, afternoon and bedtime. Please stop all vitamins/supplements 7 days prior to surgery and stop all NSAIDS (ibuprofen, naproxen, aleve, motrin, advil) 5 days before your surgery. A responsible adult must drive you to the hospital, remain in the building during surgery and you will need adult supervision for 24 hours after anesthesia. Please use an antibacterial soap (Dial, Safeguard, etc.) the night before surgery and on the morning of surgery. Do NOT wear: deodorant, make-up, nail polish, lotions, cologne, perfumes, powders or oil on your skin. All piercings/metal/jewelry must be removed prior to arrival.  If you wear contacts then you will need to bring a case to store them in or wear your glasses.  Artificial nails are not permitted. Please leave all your valuables at home but be sure to bring your insurance card and ID on the day of surgery for registration/identification. Our Guide to Surgery with additional information can be found:  [email or web address]    Emailed to: [email or web address]

## 2022-11-09 NOTE — PERIOP NOTE
Telephone call to patient, Endy Garcia to discuss preop instructions as well as pre op  arrival time given of 0945 on 11/10/2022. Pre op instructions reviewed with patient and all questions answered. Left contact information for any additional questions or needs.

## 2022-11-09 NOTE — PERIOP NOTE
Patient verified name and . Order for consent  found in EHR and matches case posting; patient verifies procedure. Type 1B surgery, phone assessment complete. Orders received. Labs per surgeon: Unknown  Labs per anesthesia protocol: Potassium DOS    Patient answered medical/surgical history questions at their best of ability. All prior to admission medications documented in [facility]. Patient instructed to take the following medications the day of surgery according to anesthesia guidelines with a small sip of water: famotidine, finasteride, flomax. On the day before surgery please take Acetaminophen 1000mg in the morning and then again before bed. You may substitute for Tylenol 650 mg. Hold all vitamins 7 days prior to surgery and NSAIDS 5 days prior to surgery. Prescription meds to hold: chlorthalidone    Patient instructed on the following:    > Arrive at MAIN Entrance, time of arrival to be called the day before by 1700  > NPO after midnight, unless otherwise indicated, including gum, mints, and ice chips  > Responsible adult must drive patient to the hospital, stay during surgery, and patient will need supervision 24 hours after anesthesia  > Use antibacterial soap in shower the night before surgery and on the morning of surgery  > All piercings must be removed prior to arrival.    > Leave all valuables (money and jewelry) at home but bring insurance card and ID on DOS.   > You may be required to pay a deductible or co-pay on the day of your procedure. You can pre-pay by calling [phone] if your surgery is at the [facility] or [phone] if your surgery is at the [facility]. > Do not wear make-up, nail polish, lotions, cologne, perfumes, powders, or oil on skin. Artificial nails are not permitted.

## 2022-11-10 ENCOUNTER — HOSPITAL ENCOUNTER (OUTPATIENT)
Age: 77
Setting detail: OUTPATIENT SURGERY
Discharge: HOME | End: 2022-11-10
Attending: UROLOGY | Admitting: UROLOGY
Payer: MEDICARE

## 2022-11-10 ENCOUNTER — ANESTHESIA (OUTPATIENT)
Dept: SURGERY | Age: 77
End: 2022-11-10
Payer: MEDICARE

## 2022-11-10 VITALS
BODY MASS INDEX: 25 KG/M2 | HEIGHT: 70 IN | HEART RATE: 56 BPM | DIASTOLIC BLOOD PRESSURE: 68 MMHG | WEIGHT: 174.6 LBS | TEMPERATURE: 97.5 F | SYSTOLIC BLOOD PRESSURE: 136 MMHG | OXYGEN SATURATION: 99 % | RESPIRATION RATE: 18 BRPM

## 2022-11-10 DIAGNOSIS — R97.20 ELEVATED PROSTATE SPECIFIC ANTIGEN (PSA): ICD-10-CM

## 2022-11-10 LAB
APPEARANCE UR: CLEAR
BILIRUB UR QL: NEGATIVE
COLOR UR: NORMAL
GLUCOSE UR STRIP.AUTO-MCNC: NEGATIVE MG/DL
HGB UR QL STRIP: NEGATIVE
KETONES UR QL STRIP.AUTO: NEGATIVE MG/DL
LEUKOCYTE ESTERASE UR QL STRIP.AUTO: NEGATIVE
NITRITE UR QL STRIP.AUTO: NEGATIVE
PH UR STRIP: 6.5 [PH] (ref 5–9)
POTASSIUM BLD-SCNC: 3.7 MMOL/L (ref 3.5–5.1)
PROT UR STRIP-MCNC: NEGATIVE MG/DL
SP GR UR REFRACTOMETRY: 1.02 (ref 1–1.02)
UROBILINOGEN UR QL STRIP.AUTO: 0.2 EU/DL (ref 0.2–1)

## 2022-11-10 PROCEDURE — 7100000010 HC PHASE II RECOVERY - FIRST 15 MIN: Performed by: UROLOGY

## 2022-11-10 PROCEDURE — 84132 ASSAY OF SERUM POTASSIUM: CPT

## 2022-11-10 PROCEDURE — 76942 ECHO GUIDE FOR BIOPSY: CPT | Performed by: UROLOGY

## 2022-11-10 PROCEDURE — 2580000003 HC RX 258: Performed by: ANESTHESIOLOGY

## 2022-11-10 PROCEDURE — 3700000001 HC ADD 15 MINUTES (ANESTHESIA): Performed by: UROLOGY

## 2022-11-10 PROCEDURE — 88305 TISSUE EXAM BY PATHOLOGIST: CPT

## 2022-11-10 PROCEDURE — 6360000002 HC RX W HCPCS: Performed by: UROLOGY

## 2022-11-10 PROCEDURE — 3600000002 HC SURGERY LEVEL 2 BASE: Performed by: UROLOGY

## 2022-11-10 PROCEDURE — 2580000003 HC RX 258: Performed by: UROLOGY

## 2022-11-10 PROCEDURE — 3700000000 HC ANESTHESIA ATTENDED CARE: Performed by: UROLOGY

## 2022-11-10 PROCEDURE — 2500000003 HC RX 250 WO HCPCS

## 2022-11-10 PROCEDURE — 7100000001 HC PACU RECOVERY - ADDTL 15 MIN: Performed by: UROLOGY

## 2022-11-10 PROCEDURE — 55700 PR BIOPSY OF PROSTATE,NEEDLE/PUNCH: CPT | Performed by: UROLOGY

## 2022-11-10 PROCEDURE — 7100000000 HC PACU RECOVERY - FIRST 15 MIN: Performed by: UROLOGY

## 2022-11-10 PROCEDURE — 3600000012 HC SURGERY LEVEL 2 ADDTL 15MIN: Performed by: UROLOGY

## 2022-11-10 PROCEDURE — 2709999900 HC NON-CHARGEABLE SUPPLY: Performed by: UROLOGY

## 2022-11-10 PROCEDURE — 81003 URINALYSIS AUTO W/O SCOPE: CPT

## 2022-11-10 PROCEDURE — 6360000002 HC RX W HCPCS

## 2022-11-10 RX ORDER — SODIUM CHLORIDE 9 MG/ML
INJECTION, SOLUTION INTRAVENOUS PRN
Status: DISCONTINUED | OUTPATIENT
Start: 2022-11-10 | End: 2022-11-10 | Stop reason: HOSPADM

## 2022-11-10 RX ORDER — SODIUM CHLORIDE 0.9 % (FLUSH) 0.9 %
5-40 SYRINGE (ML) INJECTION PRN
Status: DISCONTINUED | OUTPATIENT
Start: 2022-11-10 | End: 2022-11-10 | Stop reason: HOSPADM

## 2022-11-10 RX ORDER — LIDOCAINE HYDROCHLORIDE 10 MG/ML
1 INJECTION, SOLUTION INFILTRATION; PERINEURAL
Status: DISCONTINUED | OUTPATIENT
Start: 2022-11-10 | End: 2022-11-10 | Stop reason: HOSPADM

## 2022-11-10 RX ORDER — HALOPERIDOL 5 MG/ML
1 INJECTION INTRAMUSCULAR
Status: DISCONTINUED | OUTPATIENT
Start: 2022-11-10 | End: 2022-11-10 | Stop reason: HOSPADM

## 2022-11-10 RX ORDER — OXYCODONE HYDROCHLORIDE 5 MG/1
5 TABLET ORAL
Status: DISCONTINUED | OUTPATIENT
Start: 2022-11-10 | End: 2022-11-10 | Stop reason: HOSPADM

## 2022-11-10 RX ORDER — PROPOFOL 10 MG/ML
INJECTION, EMULSION INTRAVENOUS PRN
Status: DISCONTINUED | OUTPATIENT
Start: 2022-11-10 | End: 2022-11-10 | Stop reason: SDUPTHER

## 2022-11-10 RX ORDER — SODIUM CHLORIDE, SODIUM LACTATE, POTASSIUM CHLORIDE, CALCIUM CHLORIDE 600; 310; 30; 20 MG/100ML; MG/100ML; MG/100ML; MG/100ML
INJECTION, SOLUTION INTRAVENOUS CONTINUOUS
Status: DISCONTINUED | OUTPATIENT
Start: 2022-11-10 | End: 2022-11-10 | Stop reason: HOSPADM

## 2022-11-10 RX ORDER — HYDROMORPHONE HYDROCHLORIDE 2 MG/ML
0.25 INJECTION, SOLUTION INTRAMUSCULAR; INTRAVENOUS; SUBCUTANEOUS EVERY 5 MIN PRN
Status: DISCONTINUED | OUTPATIENT
Start: 2022-11-10 | End: 2022-11-10 | Stop reason: HOSPADM

## 2022-11-10 RX ORDER — SODIUM CHLORIDE 0.9 % (FLUSH) 0.9 %
5-40 SYRINGE (ML) INJECTION EVERY 12 HOURS SCHEDULED
Status: DISCONTINUED | OUTPATIENT
Start: 2022-11-10 | End: 2022-11-10 | Stop reason: HOSPADM

## 2022-11-10 RX ORDER — CEFTRIAXONE 2 G/1
INJECTION, POWDER, FOR SOLUTION INTRAMUSCULAR; INTRAVENOUS PRN
Status: DISCONTINUED | OUTPATIENT
Start: 2022-11-10 | End: 2022-11-10 | Stop reason: SDUPTHER

## 2022-11-10 RX ORDER — DEXTROSE MONOHYDRATE 100 MG/ML
INJECTION, SOLUTION INTRAVENOUS CONTINUOUS PRN
Status: DISCONTINUED | OUTPATIENT
Start: 2022-11-10 | End: 2022-11-10 | Stop reason: HOSPADM

## 2022-11-10 RX ORDER — LIDOCAINE HYDROCHLORIDE 20 MG/ML
INJECTION, SOLUTION EPIDURAL; INFILTRATION; INTRACAUDAL; PERINEURAL PRN
Status: DISCONTINUED | OUTPATIENT
Start: 2022-11-10 | End: 2022-11-10 | Stop reason: SDUPTHER

## 2022-11-10 RX ORDER — ONDANSETRON 2 MG/ML
4 INJECTION INTRAMUSCULAR; INTRAVENOUS
Status: DISCONTINUED | OUTPATIENT
Start: 2022-11-10 | End: 2022-11-10 | Stop reason: HOSPADM

## 2022-11-10 RX ADMIN — CEFTRIAXONE 2000 MG: 2 INJECTION, POWDER, FOR SOLUTION INTRAMUSCULAR; INTRAVENOUS at 13:38

## 2022-11-10 RX ADMIN — SODIUM CHLORIDE, POTASSIUM CHLORIDE, SODIUM LACTATE AND CALCIUM CHLORIDE: 600; 310; 30; 20 INJECTION, SOLUTION INTRAVENOUS at 10:43

## 2022-11-10 RX ADMIN — PROPOFOL 50 MG: 10 INJECTION, EMULSION INTRAVENOUS at 13:35

## 2022-11-10 RX ADMIN — PROPOFOL 140 MCG/KG/MIN: 10 INJECTION, EMULSION INTRAVENOUS at 13:36

## 2022-11-10 RX ADMIN — LIDOCAINE HYDROCHLORIDE 40 MG: 20 INJECTION, SOLUTION EPIDURAL; INFILTRATION; INTRACAUDAL; PERINEURAL at 13:35

## 2022-11-10 RX ADMIN — CEFTRIAXONE 2000 MG: 2 INJECTION, POWDER, FOR SOLUTION INTRAMUSCULAR; INTRAVENOUS at 10:43

## 2022-11-10 ASSESSMENT — PAIN DESCRIPTION - DESCRIPTORS: DESCRIPTORS: ACHING

## 2022-11-10 ASSESSMENT — PAIN - FUNCTIONAL ASSESSMENT
PAIN_FUNCTIONAL_ASSESSMENT: NONE - DENIES PAIN
PAIN_FUNCTIONAL_ASSESSMENT: 0-10
PAIN_FUNCTIONAL_ASSESSMENT: 0-10

## 2022-11-10 ASSESSMENT — ENCOUNTER SYMPTOMS: SHORTNESS OF BREATH: 1

## 2022-11-10 ASSESSMENT — LIFESTYLE VARIABLES: SMOKING_STATUS: 1

## 2022-11-10 NOTE — ANESTHESIA PRE PROCEDURE
Department of Anesthesiology  Preprocedure Note       Name:  Rowland Kocher   Age:  68 y.o.  :  1945                                          MRN:  [id number]         Date:  11/10/2022      Surgeon: Ynes Watson): Carlos Trujillo MD    Procedure: Procedure(s):  MRI FUSION PROSTATE BIOPSY    Medications prior to admission:   Prior to Admission medications    Medication Sig Start Date End Date Taking? Authorizing Provider   budesonide-formoterol (SYMBICORT) 80-4.5 MCG/ACT AERO Inhale 2 puffs into the lungs 2 times daily  Patient not taking: Reported on 11/10/2022   Yes Historical Provider, MD   Cholecalciferol (VITAMIN D-3) 25 MCG (1000 UT) CAPS Take 1,000 Units by mouth daily    Historical Provider, MD   acetaminophen (TYLENOL) 500 MG tablet Take 1,000 mg by mouth every 4 hours as needed 22   Historical Provider, MD   hydrocortisone 1 % cream APPLY THIN FILM TO AFFECTED AREA TWICE A DAY ---EXTERNAL USE ONLY  Patient not taking: Reported on 2022   Historical Provider, MD   atorvastatin (LIPITOR) 80 MG tablet Take 80 mg by mouth daily    Historical Provider, MD   predniSONE 5 MG (21) TBPK As directed  Patient not taking: Reported on 11/10/2022 11/4/22   Derrick Bedolla APRN - CNP   ciprofloxacin (CIPRO) 500 MG tablet Please take the first tablet the morning of the procedure. Take the second tablet after the procedure, prior to going to bed.  10/14/22   Carlos Trujillo MD   chlorthalidone (HYGROTON) 25 MG tablet Take 12.5 mg by mouth daily 22   Historical Provider, MD   famotidine (PEPCID) 20 MG tablet Take 40 mg by mouth daily 22   Historical Provider, MD   finasteride (PROSCAR) 5 MG tablet Take 5 mg by mouth daily    Ar Automatic Reconciliation   tamsulosin (FLOMAX) 0.4 MG capsule Take 0.4 mg by mouth daily    Ar Automatic Reconciliation       Current medications:    Current Facility-Administered Medications   Medication Dose Route Frequency Provider Last Rate Last Admin    lidocaine 1 % injection 1 mL  1 mL IntraDERmal Once PRN Hailey Hinton MD        lactated ringers infusion   IntraVENous Continuous Hailey Hinton  mL/hr at 11/10/22 1043 New Bag at 11/10/22 1043    sodium chloride flush 0.9 % injection 5-40 mL  5-40 mL IntraVENous 2 times per day Hailey Hinton MD        sodium chloride flush 0.9 % injection 5-40 mL  5-40 mL IntraVENous PRN Hailey Hinton MD        0.9 % sodium chloride infusion   IntraVENous PRN Hailey Hinton MD           Allergies: Allergies   Allergen Reactions    Sulfa Antibiotics Other (See Comments)     headaches       Problem List:    Patient Active Problem List   Diagnosis Code    Inguinal hernia, left K40.90    Bladder neck obstruction N32.0    H/O testicular mass Z87.438    Elevated PSA R97.20    Prediabetes R73.03    Primary hypertension I10    Gastroesophageal reflux disease K21.9    Abdominal aortic aneurysm (AAA) 3.0 cm to 5.5 cm in diameter in male I71.40    Stage 3b chronic kidney disease (HCC) N18.32    Sciatica of right side M54.31    DDD (degenerative disc disease), lumbar M51.36    Need for influenza vaccination Z23    History of melanoma Z85.820    History of basal cell carcinoma (BCC) Z85.828    History of squamous cell carcinoma Z85.89    Primary osteoarthritis involving multiple joints M15.9    Pure hypercholesterolemia E78.00    Macrocytosis D75.89    History of anemia Z86.2       Past Medical History:        Diagnosis Date    Abdominal aneurysm without mention of rupture 9/30/2015    [id number], on 5/1/2012 per [facility] screen    Benign localized hyperplasia of prostate without urinary obstruction and other lower urinary tract symptoms (LUTS) 9/30/2015    [facility] urologist    Bladder neck obstruction 9/30/2015    Cancer ([facility] [address] 02/2017    melanoma, rt ear    COPD (chronic obstructive pulmonary disease) (HCC)     GERD (gastroesophageal reflux disease)     H/O testicular mass 9/30/2015 [facility] Working up     Hypertension     controlled w/ med    Inguinal hernia with obstruction, without mention of gangrene, recurrent unilateral or unspecified 9/30/2015    Other and unspecified hyperlipidemia 9/30/2015    Other ill-defined conditions(799.89)     high chol    Renal insufficiency 9/30/2015       Past Surgical History:        Procedure Laterality Date    ORTHOPEDIC SURGERY Right     bicep. shot in [employer] Right     torn meniscus, Dr. Charlette Lopes      w chest , [place] in [place]    OTHER SURGICAL HISTORY Right 02/2017    excision melanoma, rt ear. completely excised. Sue Moreno. #5 e [facility] Final       Social History:    Social History     Tobacco Use    Smoking status: Every Day     Packs/day: 0.25     Types: Cigarettes    Smokeless tobacco: Never   Substance Use Topics    Alcohol use: Yes     Alcohol/week: 14.0 standard drinks     Types: 14 Drinks containing 0.5 oz of alcohol per week                                Ready to quit: Not Answered  Counseling given: Not Answered      Vital Signs (Current):   Vitals:    11/09/22 1112 11/10/22 1017   BP:  126/65   Pulse:  60   Resp:  16   Temp:  97.3 °F (36.3 °C)   TempSrc:  Skin   SpO2:  99%   Weight: 175 lb (79.4 kg) 174 lb 9.6 oz (79.2 kg)   Height: 5' 10\" (1.778 m) 5' 10\" (1.778 m)                                              BP Readings from Last 3 Encounters:   11/10/22 126/65   11/04/22 110/70   10/14/22 (!) 140/79       NPO Status: Time of last liquid consumption: 1800                        Time of last solid consumption: 1800                        Date of last liquid consumption: 11/09/22                        Date of last solid food consumption: 11/09/22    BMI:   Wt Readings from Last 3 Encounters:   11/10/22 174 lb 9.6 oz (79.2 kg)   11/04/22 175 lb 8 oz (79.6 kg)   10/14/22 179 lb 4.8 oz (81.3 kg)     Body mass index is 25.05 kg/m².     CBC: No results found for: WBC, RBC, HGB, HCT, MCV, RDW, PLT    CMP: No results found for: NA, K, CL, CO2, BUN, CREATININE, GFRAA, AGRATIO, LABGLOM, GLUCOSE, GLU, PROT, CALCIUM, BILITOT, ALKPHOS, AST, ALT    POC Tests:   Recent Labs     11/10/22  1034   POCK 3.7       Coags: No results found for: PROTIME, INR, APTT    HCG (If Applicable): No results found for: PREGTESTUR, PREGSERUM, HCG, HCGQUANT     ABGs: No results found for: PHART, PO2ART, QPV9RCE, VFR1WVA, BEART, N1AIZRQA     Type & Screen (If Applicable):  No results found for: LABABO, LABRH    Drug/Infectious Status (If Applicable):  No results found for: HIV, HEPCAB    COVID-19 Screening (If Applicable): No results found for: COVID19        Anesthesia Evaluation  Patient summary reviewed and Nursing notes reviewed no history of anesthetic complications:   Airway: Mallampati: II  TM distance: >3 FB   Neck ROM: full  Mouth opening: > = 3 FB   Dental: normal exam   (+) implants      Pulmonary:normal exam    (+) COPD (rare inhaler use):  shortness of breath: no interval change,  current smoker (~1/3 ppd)                           Cardiovascular:    (+) hypertension:, CAD:,                   Neuro/Psych:                ROS comment: Chronic pain GI/Hepatic/Renal:   (+) GERD:, renal disease (CKD3): CRI,           Endo/Other:                     Abdominal:             Vascular:   + PVD, aortic or cerebral, . ROS comment: AAA, stable. Other Findings:           Anesthesia Plan      TIVA     ASA 3       Induction: intravenous. MIPS: Postoperative opioids intended. Anesthetic plan and risks discussed with patient and spouse.                         Cherelle Woodruff MD   11/10/2022

## 2022-11-10 NOTE — ANESTHESIA POSTPROCEDURE EVALUATION
Department of Anesthesiology  Postprocedure Note    Patient: Gia Jonas  MRN: [id number]  YOB: 1945  Date of evaluation: 11/10/2022      Procedure Summary     Date: 11/10/22 Room / Location: [facility] MAIN OR 03 / [facility] MAIN OR    Anesthesia Start: 5799 Anesthesia Stop: 7050    Procedure: MRI FUSION PROSTATE BIOPSY (Rectum) Diagnosis:       Elevated prostate specific antigen (PSA)      (Elevated prostate specific antigen (PSA) [R97.20])    Providers: Rosangela Hogue MD Responsible Provider: Tristan Thomas DO    Anesthesia Type: TIVA ASA Status: 3          Anesthesia Type: No value filed.     Leon Phase I: Leon Score: 10    Leon Phase II: Leon Score: 10      Anesthesia Post Evaluation    Patient location during evaluation: PACU  Level of consciousness: awake and alert  Airway patency: patent  Nausea & Vomiting: no nausea  Complications: no  Cardiovascular status: hemodynamically stable  Respiratory status: acceptable  Hydration status: euvolemic

## 2022-11-10 NOTE — DISCHARGE INSTRUCTIONS
My office will schedule your follow-up appointment. Please call our office ([phone]) or return to ED if you experience fever > 101.5, severe pain, persistent nausea/vomiting, excessive bleeding, inability to urinate, or other concerns. Please take your cipro tablet this evening. Prostate Biopsy and Ultrasound:   A prostate biopsy is a type of test. Your doctor takes small tissue samples from your prostate gland. Then another doctor looks at the tissue under a microscope to see if there are cancer cells. This test is done by a doctor who specializes in men's genital and urinary problems (urologist). It can be done in your doctor's office, a day surgery clinic, or a hospital operating room. To get the tissue samples from the prostate, the doctor inserts a thin needle through the rectum, the urethra, or the area between the anus and scrotum (perineum). The most common method is through the rectum. Your doctor may use ultrasound to help guide the needle. What else should you know about this test?  A prostate biopsy has a slight risk of causing problems such as infection or bleeding. If the biopsy went through your rectum, you may have a small amount of bleeding from your rectum for 2 to 3 days after the biopsy. You may have a little pain in your pelvic area. You may also have a little blood in your urine for 1 to 5 days. You may have some blood in your semen for a week or longer. Do not do heavy work or exercise for 4 hours after the test.  Your doctor will tell you how long it may take to get your results back. Follow-up care is a key part of your treatment and safety. Be sure to make and go to all appointments, and call your doctor if you are having problems. It's also a good idea to keep a list of the medicines you take. Ask your doctor when you can expect to have your test results.     After general anesthesia or intravenous sedation, for 24 hours or while taking prescription Narcotics:  Limit your activities  A responsible adult needs to be with you for the next 24 hours  Do not drive and operate hazardous machinery  Do not make important personal or business decisions  Do not drink alcoholic beverages  If you have not urinated within 8 hours after discharge, and you are experiencing discomfort from urinary retention, please go to the nearest ED. If you have sleep apnea and have a CPAP machine, please use it for all naps and sleeping. Please use caution when taking narcotics and any of your home medications that may cause drowsiness. *  Please give a list of your current medications to your Primary Care Provider. *  Please update this list whenever your medications are discontinued, doses are      changed, or new medications (including over-the-counter products) are added. *  Please carry medication information at all times in case of emergency situations. These are general instructions for a healthy lifestyle:  No smoking/ No tobacco products/ Avoid exposure to second hand smoke  Surgeon General's Warning:  Quitting smoking now greatly reduces serious risk to your health. Obesity, smoking, and sedentary lifestyle greatly increases your risk for illness  A healthy diet, regular physical exercise & weight monitoring are important for maintaining a healthy lifestyle    You may be retaining fluid if you have a history of heart failure or if you experience any of the following symptoms:  Weight gain of 3 pounds or more overnight or 5 pounds in a week, increased swelling in our hands or feet or shortness of breath while lying flat in bed. Please call your doctor as soon as you notice any of these symptoms; do not wait until your next office visit.

## 2022-11-10 NOTE — OP NOTE
Operative Note        Patient: Ricardo Kim, [id number]    Date of Surgery: 11/10/22    Preoperative Diagnosis: Elevated PSA    Postoperative Diagnosis:  same    Surgeon(s) and Role:     * Derrick Hernandez MD - Primary     Anesthesia:  MAC     Procedure: Procedure(s):  Bernardine Cuff MRI fused TRUS prostate biopsy     Indications:     Discussed the risk of surgery including infection, hematoma, bleeding, urinary retention, and the risks of anesthethesia. The patient understands the risks, any and all questions were answered to the patient's satisfaction and signed the consent for operation. URONAV MRI FUSED TRANSRECTAL ULTRASOUND GUIDED BIOPSY OF THE PROSTATE    All risks, benefits and alternatives were again reviewed and he is willing to proceed at this time. The patient was placed in the left lateral decubitus position. Rocephin was given as a prophylactic antibiotic. A betadine swab was used to prep the anus/rectum. I then inserted the transrectal ultrasound probe into the rectum. The prostate was visualized. The prostate appeared homogenous in appearance. Ultrasonographic sweep of the prostate was performed to obtain images to link to MRI via URONAV. There were 2 regions of interest based upon MRI imaging. 3 biopsies of regions of interest were then obtained using the ultrasound images for guidance that had been linked to the previous MRI using Uronav. I then performed 12 needle core biopsies using a standard sextant biopsy format with traditional ultrasound images for guidance. The ultrasound probe was removed. The patient tolerated the procedure well. PROSTATE VOLUME:  17 cc    PARTH: No induration or nodule     Estimated Blood Loss:  minimal    Specimens: prostate biopsies             Drains: none                 Implants: * No implants in log *    Milton Hawkins M.D.     [facility]  [address] [place] [address]
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
## 2022-11-28 ENCOUNTER — OFFICE VISIT (OUTPATIENT)
Dept: ONCOLOGY | Age: 77
End: 2022-11-28
Payer: MEDICARE

## 2022-11-28 VITALS
OXYGEN SATURATION: 98 % | BODY MASS INDEX: 25.15 KG/M2 | WEIGHT: 175.7 LBS | SYSTOLIC BLOOD PRESSURE: 125 MMHG | TEMPERATURE: 97.5 F | HEART RATE: 62 BPM | RESPIRATION RATE: 16 BRPM | DIASTOLIC BLOOD PRESSURE: 72 MMHG | HEIGHT: 70 IN

## 2022-11-28 DIAGNOSIS — C61 PROSTATE CANCER (HCC): Primary | ICD-10-CM

## 2022-11-28 PROCEDURE — 3074F SYST BP LT 130 MM HG: CPT | Performed by: UROLOGY

## 2022-11-28 PROCEDURE — 1123F ACP DISCUSS/DSCN MKR DOCD: CPT | Performed by: UROLOGY

## 2022-11-28 PROCEDURE — 3078F DIAST BP <80 MM HG: CPT | Performed by: UROLOGY

## 2022-11-28 PROCEDURE — G8427 DOCREV CUR MEDS BY ELIG CLIN: HCPCS | Performed by: UROLOGY

## 2022-11-28 PROCEDURE — G8417 CALC BMI ABV UP PARAM F/U: HCPCS | Performed by: UROLOGY

## 2022-11-28 PROCEDURE — G8484 FLU IMMUNIZE NO ADMIN: HCPCS | Performed by: UROLOGY

## 2022-11-28 PROCEDURE — 4004F PT TOBACCO SCREEN RCVD TLK: CPT | Performed by: UROLOGY

## 2022-11-28 PROCEDURE — 99215 OFFICE O/P EST HI 40 MIN: CPT | Performed by: UROLOGY

## 2022-11-28 ASSESSMENT — PATIENT HEALTH QUESTIONNAIRE - PHQ9
SUM OF ALL RESPONSES TO PHQ QUESTIONS 1-9: 0
1. LITTLE INTEREST OR PLEASURE IN DOING THINGS: 0
2. FEELING DOWN, DEPRESSED OR HOPELESS: 0
SUM OF ALL RESPONSES TO PHQ9 QUESTIONS 1 & 2: 0
SUM OF ALL RESPONSES TO PHQ QUESTIONS 1-9: 0

## 2022-11-28 ASSESSMENT — ENCOUNTER SYMPTOMS
GASTROINTESTINAL NEGATIVE: 1
RESPIRATORY NEGATIVE: 1

## 2022-11-28 NOTE — PATIENT INSTRUCTIONS
Patient Instructions from Today's Visit    Reason for Visit:  Follow up, prostate biopsy results    Diagnosis Information:  [email or web address]      Plan: Your prostate biopsy does show a high grade prostate cancer. A Boston score of 4+4=8 was given. We would like you to have a bone scan to rule out metastatic spread. Surgery to remove the prostate is a possibility however, radiation therapy with hormone therapy is recommended in your case. Urinary and sexual dysfunction are the two main side effects of both treatment options    ADT: Androgen Deprivation Therapy information will be included in this printout   -calcium and vitamin d are suggested to be taken along with the Lupron. The suggested dosage is included below. Follow Up: We are going to refer you to our radiation oncologists to discuss starting radiation therapy. We will plan to see you back in a few weeks to go over the bone scan and potentially get you started on the hormone therapy     Recent Lab Results:  N/a    Treatment Summary has been discussed and given to patient:   N/a      -------------------------------------------------------------------------------------------------------------------    Patient does express an interest in My Chart. My Chart log in information explained on the after visit summary printout at the [facility]  desk. [place], [place]        Androgen Deprivation Therapy  Lupron (leuprolide)/Eligard    This is a medication that is used to treat Prostate Cancer. This is an intramuscular/Subcutaneous injection given no more than every 12 weeks. This medication lowers the level of testosterone that your body produces. Some of the side effects include:     Hot Flashes *most common  Fatigue  Muscle and/or joint discomfort  Edema (swelling)  Diarrhea  Headache  Sexual Dysfunction/Decreased libido  Increased cholesterol/triglycerides  Increase in fasting blood glucose  Weight gain (decrease in lean body mass/increase in fat)  Anemia  Decreased bone density    You must quit smoking  Keep caffeine and alcohol consumption moderate  You should be doing regular weight bearing/resistance exercise  Take the following supplements:  Calcium: 7680-4495 mg daily in divided doses (for example calcium 600 mg by mouth twice daily or 500mg three times daily with meals)  Vitamin D3: 800-1000 IUs of daily in divided doses (for example vitamin D 400 IUs by mouth twice daily with the calcium, as vitamin d helps calcium absorption)    If you have any of the above side effects and they are bothersome or you just have questions, please give us a call. Lupron        Generic name: Leuprolide  Other trade names: [employer]®, Meme uses generic names in all descriptions of drugs. Lupron, Eligard, Lupron Depot, and Viadur are trade names for Leuprolide. In some cases, health care professionals may use the trade names Lupron, Eligard, Lupron Depot, and Viadur when referring to the generic drug name Leuprolide. Drug type:  Lupron is a hormone therapy. It is classified as an [facility] agonist.\" (For more detail, see \"How this drug works\" section below). What This Drug Is Used For:  Prostate cancer  Breast, ovarian and endometrial cancer  Also used in non-cancerous conditions such as endometriosis, infertility, benign prostatic hypertrophy (BPH). Note:  If a drug has been approved for one use, physicians may elect to use this same drug for other problems if they believe it may be helpful. How This Drug Is Given:  As an injection under the skin (subcutaneous, SubQ), or into the muscle (intramuscular, IM). May be given as a daily, monthly, or every 3 or 4month injection depending on the formulation and condition being treated. Also may be given as a once-a-year implantable device (Viadur(tm)).   The device looks like a one and 1/2 inch coffee stirrer. It is implanted under the skin. Positioned at one end of the device are \"osmotic tablets. \"  These tablets expand in the presence of water drawn in from the surrounding tissue at a constant and steady rate. As water is drawn in through this end, it exerts pressure inside the implant that steadily pushes the right amount of medication out of a small hole in the other end. The device is removed at the end of the year. There is no pill form of leuprolide. Your doctor will determine your dose and schedule. Side Effects:  Important things to remember about the side effects of leuprolide:  Most people do not experience all of the side effects listed. Side effects are often predictable in terms of their onset and duration. Side effects are almost always reversible and will go away after treatment is complete. There are many options to help minimize or prevent side effects. There is no relationship between the presence or severity of side effects and the effectiveness of the medication. The following side effects are common (occurring in greater than 30%) for patients taking leuprolide: Hot flashes (see sexuality)  Loss of interest in sex (decreased libido) (see sexuality)  Inability to obtain or sustain an erection (impotence) (see sexuality)  These side effects are less common side effects (occurring in about 10-29%) of patients receiving leuprolide:  Increased bone pain or urinary retention due to disease \"flare\" during first couple weeks of treatment. Breast pain (see sexuality)  Discomfort at injection site  Blood test abnormalities - increased cholesterol levels  Swelling of feet or ankles (edema)  Weakness -general loss of strength  Swelling of the breasts (gynecomastia) (see sexuality)  Depression  Sweating  Leuprolide may cause short-term (within first 2 weeks of treatment) increases in testosterone serum levels.   When this is used for prostate cancer the resulting \"tumor flare\" can cause temporary increase of bone pain, swelling of the prostate that blocks urine flow or swelling around tumor in the spine causing compression of the spinal cord. If you are noticing increased weakness, numbness or tingling in arms or legs, or difficulty with urination, report these symptoms to your health care provider immediately. Rare but significant side effects may include heart problems such as congestive heart failure (1%) or problems with blood clots (1%). Blood clots can lead to pulmonary embolus or stroke - potentially life-threatening conditions. Not all side effects are listed above. Some that are rare (occurring in less than 10% of patients) are not listed here. However, you should always inform your health care provider if you experience any unusual symptoms. When to contact your doctor or health care provider:  Contact your health care provider immediately, day or night, if you should experience any of the following symptoms:  Urinary retention or inability to urinate  Weakness, numbness or tingling in arms or legs  The following symptoms require medical attention, but are not an emergency. Contact your health care provider within 24 hours of noticing any of the following:  Extreme fatigue (unable to carry on self-care activities)  Swelling of the feet or ankles. Sudden weight gain  Swelling, redness and/or pain in one leg or arm and not the other  Changes in mood or memory  Always inform your health care provider if you experience any unusual symptoms. Precautions:  Before starting leuprolide treatment, make sure you tell your doctor about any other medications you are taking (including prescription, over-the-counter, vitamins, herbal remedies, etc.). Do not take aspirin, products containing aspirin unless your doctor specifically permits this. Inform your health care professional if you are pregnant or may be pregnant prior to starting this treatment.  Pregnancy category X (leuprolide may cause fetal harm when given to a pregnant woman. This drug must not be given to a pregnant woman or a woman who intends to become pregnant. If a woman becomes pregnant while taking leuprolide, the medication must be stopped immediately and the woman given appropriate counseling). For both men and women: Do not conceive a child (get pregnant) while taking leuprolide. Barrier methods of contraception, such as condoms, are recommended. Discuss with your doctor when you may safely become pregnant or conceive a child after therapy. Do not breast feed while taking this medication. Self-Care Tips:  If you are experiencing hot flashes, wearing light clothing, staying in a cool environment, and putting cool cloths on your head may reduce symptoms. Consult your health care provider if these worsen, or become intolerable. Avoid sun exposure. Wear SPF 13 (or higher) sunblock and protective clothing. In general, drinking alcoholic beverages should be kept to a minimum or avoided completely. You should discuss this with your doctor. Get plenty of rest.   Maintain good nutrition. If you experience symptoms or side effects, be sure to discuss them with your health care team.  They can prescribe medications and/or offer other suggestions that are effective in managing such problems. Monitoring and Testing: You will be checked regularly by your health care professional while you are taking leuprolide, to monitor side effects and check your response to therapy. Periodic blood work to monitor your complete blood count (CBC) as well as the function of other organs (such as your kidneys and liver) may also be ordered by your doctor. How This Drug Works:  Hormones are chemical substances that are produced by glands in the body, which enter the bloodstream and cause effects in other tissues.   For example, the hormone testosterone, made in the testicles and is responsible for male characteristics such as deepening voice and increased body hair. The use of hormone therapy to treat cancer is based on the observation that receptors for specific hormones that are needed for cell growth are on the surface of some tumor cells. Hormone therapy can work by stopping the production of a certain hormone, blocking hormone receptors, or substituting chemically similar agents for the active hormone, which cannot be used by the tumor cell. Different types of hormone therapies are categorized by their function and/or the type of hormone that is affected. Leuprolide is classified as a leutinizing hormone releasing hormone [facility]) agonist. HRH agonists work by telling the pituitary gland located in the brain to stop producing leutinizing hormone, which (in men) stimulates the testicles to release testosterone and (in women) stimulates the ovaries to release estrogen. The drug does not have a direct effect on the cancer, only on the testicles or ovaries. The resulting lack of testosterone (in men) and estrogen (in women) interferes with stimulating cell growth in testosterone or estrogen dependent cancer cells. In treatment of prostate cancer LHRH agonists are often used together with anti-androgen medications. Anti-androgens are substances that block the effects of testosterone. Cancer of the prostate depends on the male hormone testosterone for its growth. If the amount of testosterone is reduced it is possible to slow down or shrink the cancer. Examples of anti-androgens are: bicalutamide, flutamide, nilutamide. Note:  We strongly encourage you to talk with your health care professional about your specific medical condition and treatments. The information contained in this website is meant to be helpful and educational, but is not a substitute for medical advice.

## 2022-11-28 NOTE — PROGRESS NOTES
Surgical History:   Procedure Laterality Date    ORTHOPEDIC SURGERY Right     bicep. shot in [address] Right     torn meniscus, Dr. Fidelia Jones      [facility] chest , [place]    OTHER SURGICAL HISTORY Right 02/2017    excision melanoma, rt ear. completely excised. Dr. Dianne Roger, [facility] 143 N/A 11/10/2022    MRI FUSION PROSTATE BIOPSY performed by Gregorio Glasgow MD at [facility] MAIN OR     Family History:   Family History   Problem Relation Age of Onset    Heart Disease Mother     Stroke Mother     Heart Disease Father       Social History:   Social History     Tobacco Use    Smoking status: Some Days     Packs/day: 0.25     Types: Cigarettes    Smokeless tobacco: Never   Substance Use Topics    Alcohol use: Yes     Alcohol/week: 14.0 standard drinks     Types: 14 Drinks containing 0.5 oz of alcohol per week       ALLERGIES:   Allergies   Allergen Reactions    Sulfa Antibiotics Other (See Comments)     headaches        Patient Medications    Current Outpatient Medications   Medication Sig    traMADol (ULTRAM) 50 MG tablet Take 1 tablet by mouth 3 times daily as needed for Pain for up to 30 days. budesonide-formoterol (SYMBICORT) 80-4.5 MCG/ACT AERO Inhale 2 puffs into the lungs 2 times daily (Patient not taking: Reported on 11/10/2022)    Cholecalciferol (VITAMIN D-3) 25 MCG (1000 UT) CAPS Take 1,000 Units by mouth daily    acetaminophen (TYLENOL) 500 MG tablet Take 1,000 mg by mouth every 4 hours as needed    hydrocortisone 1 % cream APPLY THIN FILM TO AFFECTED AREA TWICE A DAY ---EXTERNAL USE ONLY (Patient not taking: Reported on 11/9/2022)    atorvastatin (LIPITOR) 80 MG tablet Take 80 mg by mouth daily    predniSONE 5 MG (21) TBPK As directed (Patient not taking: Reported on 11/10/2022)    ciprofloxacin (CIPRO) 500 MG tablet Please take the first tablet the morning of the procedure.  Take the second tablet after the procedure, prior to going to bed. (Patient not taking: Reported on 11/28/2022)    chlorthalidone (HYGROTON) 25 MG tablet Take 12.5 mg by mouth daily    famotidine (PEPCID) 20 MG tablet Take 40 mg by mouth daily    finasteride (PROSCAR) 5 MG tablet Take 5 mg by mouth daily    tamsulosin (FLOMAX) 0.4 MG capsule Take 0.4 mg by mouth daily     No current facility-administered medications for this visit. ROS/Meds/PSH/PMH/FH/SH: I personally reviewed the patients standard intake form. Physical Exam:      Lumbar: PE: General: Awake, alert, no distress. HEENT: Mucous membranes moist and pink. Christina Leanna Psych: Appropriate and conversant. Derm: No rash Gait: Unassisted, non-ataxic MS: Straight leg raise negative on the left, positive on the right. No pain with hip internal or external rotation. No pain with resisted hip flexion bilaterally. No pain with lumbar flexion. He has pain with lumbar extension. No pain with lumbar facet load. Non-tender lumbar spine centrally. He has tenderness to palpation over the right lower lumbar paraspinals. Strength is 5/5 and symmetric in the bilateral lower extremities. No foot drop. Neurological: Sensation to light touch is intact in the bilateral lower extremities. VITALS: @IPVITALS(8:)@ , [id number]       No flowsheet data found. Results for orders placed or performed in visit on 11/30/22   [address]    Narrative    Radiology exam is complete. No Radiologist dictation. Please follow up   with ordering provider. Assessment and Plan:     ICD-10-CM    1. DDD (degenerative disc disease), lumbar  M51.36 Injection Authorization - Spine     Ambulatory referral to Physical Therapy     traMADol (ULTRAM) 50 MG tablet      2. Lumbar spondylosis  M47.816       3.  Lumbar radiculopathy  M54.16           Orders Placed This Encounter   Procedures    Injection Authorization - Spine     Referral Priority:   Routine     Number of Visits Requested:   1    Ambulatory referral to Physical Therapy     Referral Priority:   Routine     Referral Type:   Eval and Treat     Referral Reason:   Physical Therapy     Requested Specialty:   Physical Therapist     Number of Visits Requested:   1        4   This is a chronic illness/condition with exacerbation and progression  Treatment at this time: Prescription Drug Management. For somatic pain relief, he was given a prescription for tramadol and counseled regarding the possibility risk, complications, and alternative treatment options from this medication. He will attend physical therapy for strengthening, stretching, and a home exercise program.    Discussed Injection therapy at length today, including risks, complications and alternative treatment options. The patient wishes to proceed. The injection will be performed as right L4-5 transforaminal epidural steroid injection. Studies ordered today: none. We discussed the possibility of a new lumbar spine MRI, and he would like to wait on that for now.       Sussy Hawk MD  11/30/2022,  4:46 PM

## 2022-11-30 ENCOUNTER — OFFICE VISIT (OUTPATIENT)
Dept: ORTHOPEDIC SURGERY | Age: 77
End: 2022-11-30

## 2022-11-30 DIAGNOSIS — M51.36 DDD (DEGENERATIVE DISC DISEASE), LUMBAR: Primary | ICD-10-CM

## 2022-11-30 DIAGNOSIS — M51.36 OTHER INTERVERTEBRAL DISC DEGENERATION, LUMBAR REGION: ICD-10-CM

## 2022-11-30 DIAGNOSIS — M48.062 SPINAL STENOSIS OF LUMBAR REGION WITH NEUROGENIC CLAUDICATION: ICD-10-CM

## 2022-11-30 DIAGNOSIS — M47.816 SPONDYLOSIS WITHOUT MYELOPATHY OR RADICULOPATHY, LUMBAR REGION: ICD-10-CM

## 2022-11-30 DIAGNOSIS — M54.16 LUMBAR RADICULOPATHY: ICD-10-CM

## 2022-11-30 DIAGNOSIS — M47.816 LUMBAR SPONDYLOSIS: ICD-10-CM

## 2022-11-30 RX ORDER — METHYLPREDNISOLONE ACETATE 80 MG/ML
80 INJECTION, SUSPENSION INTRA-ARTICULAR; INTRALESIONAL; INTRAMUSCULAR; SOFT TISSUE ONCE
Status: COMPLETED | OUTPATIENT
Start: 2022-11-30 | End: 2022-11-30

## 2022-11-30 RX ORDER — TRAMADOL HYDROCHLORIDE 50 MG/1
50 TABLET ORAL 3 TIMES DAILY PRN
Qty: 45 TABLET | Refills: 0 | Status: SHIPPED | OUTPATIENT
Start: 2022-11-30 | End: 2022-12-30

## 2022-11-30 RX ADMIN — METHYLPREDNISOLONE ACETATE 80 MG: 80 INJECTION, SUSPENSION INTRA-ARTICULAR; INTRALESIONAL; INTRAMUSCULAR; SOFT TISSUE at 13:32

## 2022-11-30 NOTE — LETTER
Alla Silver  1945  ______________________________________________________________________    Radiographic Studies:    Cervical MRI/ Contrast        Thoracic MRI/ Contrast        Lumbar MRI/ Contrast    CT Myelogram __________________________________________________    NCS/EMG______________________________________________________    MRI of ________________________________________________________    Other__________________________________________________________      Injections:    _______________________________________________________________    Authorization to stop blood thinners________________________________      Medications:    Oral steroids___________________    Muscle Relaxers___________________    Pain medications_____________________    NSAIDS_____________________    Neuropathic pain medication_________________________________________      Physical Therapy:    Lumbar     Thoracic     Cervical       Other_______________________________      Follow up/ Referral:    Pain referral_______________________________________________________    Referral___________________________________________________________    Follow up_________________________________________________________    Handicapped Parking_______________________________________________    Other_____________________________________________________________

## 2022-11-30 NOTE — PROGRESS NOTES
Name: Saira Muniz  YOB: 1945  Gender: male  MRN: [id number]        Transforaminal RELL Procedure Note    Procedure: Right  L4-L5 transforaminal epidural steroid injections     Precautions: Saira Muniz denies prior sensitivity to steroid, local anesthetic, iodine, or shellfish. Consent:  Consent was obtained prior to the procedure. The procedure was discussed at length with Saira Muniz. He was given the opportunity to ask questions regarding the procedure and its associated risks. In addition to the potential risks associated with the procedure itself, the patient was informed both verbally and in writing of potential side effects of the use glucocorticoids. The patient appeared to comprehend the informed consent and desired to have the procedure performed, and informed consent was signed. He was placed in a prone position on the fluoroscopy table and the skin was prepped and draped in a routine sterile fashion. The areas to be injected were each anesthetized with 1 ml of 1% Lidocaine. A 22 gauge 3.5 inch spinal needle was carefully advanced under fluoroscopic guidance to the right L4-L5 transforaminal space  0.5 ml of 70% of Omnipaque was injected to confirm proper needle placement and absence of subdural or vascular flow Once proper placement was confirmed, 0.5ml of 0.25 marcaine and 80 mg of depomedrol were injected through the spinal needle. Fluoroscopic guidance was used intermittently over a 10-minute period to insure proper needle placement and his safety. A hard copy of the fluoroscopic image has been placed in his chart and is saved on the C-arm hard drive. He was monitored for 30 minutes after the procedure and discharged home in a stable fashion with a routine follow up. Procedural Diagnosis:     ICD-10-CM    1.  DDD (degenerative disc disease), lumbar  M51.36 FL NERVE BLOCK LUMBOSACRAL 1ST     methylPREDNISolone acetate (DEPO-MEDROL) injection 80 mg      2. Spondylosis without myelopathy or radiculopathy, lumbar region  M47.816 FL NERVE BLOCK LUMBOSACRAL 1ST     methylPREDNISolone acetate (DEPO-MEDROL) injection 80 mg      3. Other intervertebral disc degeneration, lumbar region  M51.36 FL NERVE BLOCK LUMBOSACRAL 1ST     methylPREDNISolone acetate (DEPO-MEDROL) injection 80 mg      4.  Spinal stenosis of lumbar region with neurogenic claudication  M48.062 FL NERVE BLOCK LUMBOSACRAL 1ST     methylPREDNISolone acetate (DEPO-MEDROL) injection 80 mg         Elena Soliz MD  11/30/22

## 2022-12-04 PROBLEM — Z23 NEED FOR INFLUENZA VACCINATION: Status: RESOLVED | Noted: 2022-11-04 | Resolved: 2022-12-04

## 2022-12-12 ENCOUNTER — HOSPITAL ENCOUNTER (OUTPATIENT)
Dept: NUCLEAR MEDICINE | Age: 77
Discharge: HOME | End: 2022-12-15
Payer: MEDICARE

## 2022-12-12 ENCOUNTER — APPOINTMENT (OUTPATIENT)
Dept: NUCLEAR MEDICINE | Age: 77
End: 2022-12-12
Payer: MEDICARE

## 2022-12-12 DIAGNOSIS — C61 PROSTATE CANCER (HCC): ICD-10-CM

## 2022-12-12 PROCEDURE — 3430000000 HC RX DIAGNOSTIC RADIOPHARMACEUTICAL: Performed by: UROLOGY

## 2022-12-12 PROCEDURE — A9503 TC99M MEDRONATE: HCPCS | Performed by: UROLOGY

## 2022-12-12 PROCEDURE — 78306 BONE IMAGING WHOLE BODY: CPT | Performed by: UROLOGY

## 2022-12-12 RX ORDER — TC 99M MEDRONATE 20 MG/10ML
25 INJECTION, POWDER, LYOPHILIZED, FOR SOLUTION INTRAVENOUS
Status: COMPLETED | OUTPATIENT
Start: 2022-12-12 | End: 2022-12-12

## 2022-12-12 RX ADMIN — TC 99M MEDRONATE 25 MILLICURIE: 20 INJECTION, POWDER, LYOPHILIZED, FOR SOLUTION INTRAVENOUS at 08:24

## 2022-12-14 ENCOUNTER — HOSPITAL ENCOUNTER (OUTPATIENT)
Dept: RADIATION ONCOLOGY | Age: 77
Setting detail: RECURRING SERIES
Discharge: HOME | End: 2022-12-17
Payer: MEDICARE

## 2022-12-14 VITALS
BODY MASS INDEX: 24.82 KG/M2 | SYSTOLIC BLOOD PRESSURE: 133 MMHG | OXYGEN SATURATION: 97 % | TEMPERATURE: 97.8 F | HEART RATE: 73 BPM | DIASTOLIC BLOOD PRESSURE: 92 MMHG | WEIGHT: 173 LBS

## 2022-12-14 PROCEDURE — 99211 OFF/OP EST MAY X REQ PHY/QHP: CPT

## 2022-12-14 ASSESSMENT — PAIN SCALES - GENERAL: PAINLEVEL_OUTOF10: 3

## 2022-12-14 ASSESSMENT — PAIN DESCRIPTION - LOCATION: LOCATION: HIP;LEG

## 2022-12-14 NOTE — CONSULTS
RADIATION ONCOLOGY INITIAL VISIT  12/14/22    Referring Provider: Ezekiel Santos MD    Chief Complaint: Prostate cancer     History of Present Illness:  Diagnosis  68 y.o. male who presented with an elevated PSA (corrected 13.6) and LUTS on finasteride and Flomax. PARTH negative. Also has a h/o cutaneous melanoma s/p excision alone. MRI pelvis 10/13/2022: 2 PI-RADS 4 lesions left peripheral zone gland base and right peripheral zone gland base. No AUGUSTINE, SV invasion, or lymphadenopathy. MRI fusion targeted prostate biopsy 11/10/2022:  Prostate volume 17 cc  Right lateral apex: Prostatic adenocarcinoma, [place] 3+3 = 6. Left lateral base: Prostatic adenocarcinoma, [place] 4+3 = 7. Left lateral mid: Prostatic adenocarcinoma, [place] 4+3 = 7. Left base: Prostatic adenocarcinoma, [place] 4+3 = 7. JOVITA 1: Prostatic adenocarcinoma, [place] 4+4 = 8. JOVITA 2: Prostatic adenocarcinoma, [place] 4+3 = 7. Total 6/14 regions positive, highest grade group 4. Bone scan 12/12/2022: No evidence of osseous metastases. PSA  Component Prostatic Specific Ag   Latest Ref Rng & Units <4.0 ng/mL   9/30/2022 6.8 (H)   7/5/2022 5.75     Treatment  None to date. Current symptoms/ROS:   Overall he is doing well and has no active complaints today. AUA 13 on Flomax and finasteride. Severe ED  Bowel habits: regular  New bone/back pain: chronic low back pain    Last colonoscopy: 2-3 years ago   Prior radiation: no  Implanted cardiac devices: no  History of lupus or scleroderma: no  Family history of cancer: yes - father had lung cancer  Genetic testing: no  Meds: Reviewed. Pertinent - finasteride and Flomax. Can continue all current meds during RT    Physical Examination:  Vitals:    12/14/22 1402   BP: (!) 133/92   Pulse: 73   Temp: 97.8 °F (36.6 °C)   TempSrc: Oral   SpO2: 97%   Weight: 173 lb (78.5 kg)     Pain 3/10  ECOG 1 - Strenuous physical activity restricted; fully ambulatory and able to carry out light work.     Well nourished, in NAD. Alert and oriented x3. Lungs CTAB. RRR. Abdomen is soft, NTND. Bowel sounds normal. PARTH deferred. No lower extremity edema. Assessment:  Cancer Staging  Prostate cancer [facility])  Staging form: Prostate, AJCC 8th Edition  - Clinical stage from 11/10/2022: Stage IIC (cT1c, cN0, cM0, PSA: 6.8, Grade Group: 4) - Signed by Kristie Oliveros MD on 12/14/2022     High risk prostate cancer  Life expectancy 10 years based on SS tables    We reviewed the diagnosis, prognosis, and treatment options in accordance with the NCCN guidelines. We discussed that he has high risk prostate cancer per the NCCN risk stratification. Since his life expectancy is greater than 5 years, his treatment options include radical prostatectomy and pelvic lymph node dissection or definitive radiation therapy with long-term androgen deprivation therapy. He has already discussed radical prostatectomy with Dr. Chevy Coker. We discussed the logistics, benefits, risks, and alternatives to external beam radiation therapy. Prior to the initiation of treatment, a CT simulation is performed for planning purposes. We reviewed the pros and cons of both standard fractionation and moderate hypofractionation. Based on his moderate AUA score while on two meds, I would favor standard fractionation for him. Treatment would be daily Monday-Friday for 8 weeks using standard fractionation. Patient will be assessed by me weekly while under treatment for toxicity management. The goal of treatment is curative, but there is a risk of treatment failure. Potential acute side effects of EBRT to the prostate/pelvis include but are not limited to fatigue, pain, skin changes, dysuria, frequency, urgency, urinary retention, rectal urgency, rectal irritation, diarrhea, incontinence. Potential late side effects include but are not limited to cystitis, proctitis, sexual dysfunction, stricture, fistula, fracture, second malignancy.   We also reviewed the potential side effects of androgen deprivation therapy, including but not limited to impotence, decreased libido, fatigue, weight gain, hot flashes, cognitive changes, depression, osteoporosis, elevated blood glucose and potentially cardiovascular disease. ADT would be delivered before, during, and after radiation for a total of about 1.5 years. Given he has high risk disease, if he proceeds with surgery, it is likely he would require RT to the pelvis at some point thereafter. The patient had the opportunity to ask questions, all of which were answered to his satisfaction. He understood the discussion and wanted to proceed with EBRT + ADT. Informed consent was obtained. Plan:  -Lupron already scheduled for next week with Dr. Marysol Lermaing following the Lupron  -RT plan: 46/23 to pelvis, 78/39 to prostate IMRT/VMAT (risk of lymph node involvement 22% per [facility] nomogram)  -Informed pt of my upcoming departure from this clinic and that his care will be transferred to another physician upon my departure.   -The patient has a documented plan of care to address pain. Pain is controlled on current regimen. Marisela Bernal MD  12/14/22    I spent a total of 74 minutes today performing the following care related activities for Saira Muniz:   Face to face exam/evaluation, /Educate Patient/Caregivers, Pre-Charting/Documenting after the visit, and Interpreting/Ordering Meds, Tests, Procedures

## 2022-12-14 NOTE — PROGRESS NOTES
Consult Prostate Cancer. Spouse present for consult. Aua score 13. Pt states he had colonoscopy 2-3 years ago. No previous RT history. He has chronic right hip/leg pain level 3 at present. He received a shot 2 weeks ago that has helped. He is scheduled for Lupron Monday. Radiation teaching was completed. CONSENTS SIGNED FOR RT.  CT SIM SCHEDULED. APT GIVEN TO PT.  CT SIM PREP SHEET GIVEN AND EXPLAINED. [facility] Officer.  Christine Sharma

## 2022-12-15 DIAGNOSIS — C61 PROSTATE CANCER (HCC): Primary | ICD-10-CM
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
## 2022-12-19 ENCOUNTER — HOSPITAL ENCOUNTER (OUTPATIENT)
Dept: LAB | Age: 77
Discharge: HOME | End: 2022-12-22
Payer: MEDICARE

## 2022-12-19 ENCOUNTER — HOSPITAL ENCOUNTER (OUTPATIENT)
Dept: INFUSION THERAPY | Age: 77
Discharge: HOME | End: 2022-12-19
Payer: MEDICARE

## 2022-12-19 ENCOUNTER — OFFICE VISIT (OUTPATIENT)
Dept: ONCOLOGY | Age: 77
End: 2022-12-19
Payer: MEDICARE

## 2022-12-19 VITALS
OXYGEN SATURATION: 98 % | RESPIRATION RATE: 16 BRPM | BODY MASS INDEX: 24.92 KG/M2 | SYSTOLIC BLOOD PRESSURE: 128 MMHG | DIASTOLIC BLOOD PRESSURE: 84 MMHG | TEMPERATURE: 97.4 F | HEIGHT: 70 IN | WEIGHT: 174.1 LBS | HEART RATE: 59 BPM

## 2022-12-19 DIAGNOSIS — C61 PROSTATE CANCER (HCC): ICD-10-CM

## 2022-12-19 DIAGNOSIS — C61 PROSTATE CANCER (HCC): Primary | ICD-10-CM

## 2022-12-19 LAB — PSA SERPL-MCNC: 6.6 NG/ML

## 2022-12-19 PROCEDURE — 6360000002 HC RX W HCPCS: Performed by: UROLOGY

## 2022-12-19 PROCEDURE — 4004F PT TOBACCO SCREEN RCVD TLK: CPT | Performed by: UROLOGY

## 2022-12-19 PROCEDURE — 96402 CHEMO HORMON ANTINEOPL SQ/IM: CPT

## 2022-12-19 PROCEDURE — 99213 OFFICE O/P EST LOW 20 MIN: CPT | Performed by: UROLOGY

## 2022-12-19 PROCEDURE — 84403 ASSAY OF TOTAL TESTOSTERONE: CPT

## 2022-12-19 PROCEDURE — G8420 CALC BMI NORM PARAMETERS: HCPCS | Performed by: UROLOGY

## 2022-12-19 PROCEDURE — 3074F SYST BP LT 130 MM HG: CPT | Performed by: UROLOGY

## 2022-12-19 PROCEDURE — G8427 DOCREV CUR MEDS BY ELIG CLIN: HCPCS | Performed by: UROLOGY

## 2022-12-19 PROCEDURE — 3078F DIAST BP <80 MM HG: CPT | Performed by: UROLOGY

## 2022-12-19 PROCEDURE — 1123F ACP DISCUSS/DSCN MKR DOCD: CPT | Performed by: UROLOGY

## 2022-12-19 PROCEDURE — G8484 FLU IMMUNIZE NO ADMIN: HCPCS | Performed by: UROLOGY

## 2022-12-19 PROCEDURE — 36415 COLL VENOUS BLD VENIPUNCTURE: CPT

## 2022-12-19 PROCEDURE — 84153 ASSAY OF PSA TOTAL: CPT

## 2022-12-19 RX ORDER — EPINEPHRINE 1 MG/ML
0.3 INJECTION, SOLUTION, CONCENTRATE INTRAVENOUS PRN
Status: CANCELLED | OUTPATIENT
Start: 2022-12-19

## 2022-12-19 RX ORDER — SODIUM CHLORIDE 9 MG/ML
INJECTION, SOLUTION INTRAVENOUS CONTINUOUS
Status: CANCELLED | OUTPATIENT
Start: 2022-12-19

## 2022-12-19 RX ORDER — ACETAMINOPHEN 325 MG/1
650 TABLET ORAL
OUTPATIENT
Start: 2023-06-05

## 2022-12-19 RX ORDER — ONDANSETRON 2 MG/ML
8 INJECTION INTRAMUSCULAR; INTRAVENOUS
Status: CANCELLED | OUTPATIENT
Start: 2022-12-19

## 2022-12-19 RX ORDER — ALBUTEROL SULFATE 90 UG/1
4 AEROSOL, METERED RESPIRATORY (INHALATION) PRN
Status: CANCELLED | OUTPATIENT
Start: 2022-12-19

## 2022-12-19 RX ORDER — EPINEPHRINE 1 MG/ML
0.3 INJECTION, SOLUTION, CONCENTRATE INTRAVENOUS PRN
OUTPATIENT
Start: 2023-06-05

## 2022-12-19 RX ORDER — ONDANSETRON 2 MG/ML
8 INJECTION INTRAMUSCULAR; INTRAVENOUS
OUTPATIENT
Start: 2023-06-05

## 2022-12-19 RX ORDER — ACETAMINOPHEN 325 MG/1
650 TABLET ORAL
Status: CANCELLED | OUTPATIENT
Start: 2022-12-19

## 2022-12-19 RX ORDER — DIPHENHYDRAMINE HYDROCHLORIDE 50 MG/ML
50 INJECTION INTRAMUSCULAR; INTRAVENOUS
OUTPATIENT
Start: 2023-06-05

## 2022-12-19 RX ORDER — DIPHENHYDRAMINE HYDROCHLORIDE 50 MG/ML
50 INJECTION INTRAMUSCULAR; INTRAVENOUS
Status: CANCELLED | OUTPATIENT
Start: 2022-12-19

## 2022-12-19 RX ORDER — SODIUM CHLORIDE 9 MG/ML
INJECTION, SOLUTION INTRAVENOUS CONTINUOUS
OUTPATIENT
Start: 2023-06-05

## 2022-12-19 RX ORDER — ALBUTEROL SULFATE 90 UG/1
4 AEROSOL, METERED RESPIRATORY (INHALATION) PRN
OUTPATIENT
Start: 2023-06-05

## 2022-12-19 RX ORDER — FAMOTIDINE 10 MG/ML
20 INJECTION, SOLUTION INTRAVENOUS
Status: CANCELLED | OUTPATIENT
Start: 2022-12-19

## 2022-12-19 RX ADMIN — LEUPROLIDE ACETATE 45 MG: KIT at 09:06

## 2022-12-19 ASSESSMENT — PATIENT HEALTH QUESTIONNAIRE - PHQ9
2. FEELING DOWN, DEPRESSED OR HOPELESS: 0
SUM OF ALL RESPONSES TO PHQ QUESTIONS 1-9: 0
1. LITTLE INTEREST OR PLEASURE IN DOING THINGS: 0
SUM OF ALL RESPONSES TO PHQ QUESTIONS 1-9: 0
SUM OF ALL RESPONSES TO PHQ9 QUESTIONS 1 & 2: 0

## 2022-12-19 ASSESSMENT — ENCOUNTER SYMPTOMS
GASTROINTESTINAL NEGATIVE: 1
RESPIRATORY NEGATIVE: 1

## 2022-12-19 NOTE — PROGRESS NOTES
Arrived to the [facility]. Lupron injection completed. Provided education on Lupron. Patient instructed to report any side affects to ordering provider. Patient tolerated well. Any issues or concerns during appointment: no  Patient aware of next infusion appointment on 6/23/2023 (date) at 0900 (time). Discharged ambulatory.

## 2022-12-19 NOTE — PATIENT INSTRUCTIONS
Patient Instructions from Today's Visit    Reason for Visit:  Follow up     Diagnosis Information:  [email or web address]      Plan: We are going to proceed with the lupron today. We would like you to try and complete a full two years of the ADT if possible. Follow Up: In 6 months with labs, office visit and lupron     Recent Lab Results:  Not resulted as of yet. Treatment Summary has been discussed and given to patient:   N/A      -------------------------------------------------------------------------------------------------------------------    Patient does express an interest in My Chart. My Chart log in information explained on the after visit summary printout at the [facility] Nava Santos  desk. [place], [place]       Androgen Deprivation Therapy  Lupron (leuprolide)/Eligard    This is a medication that is used to treat Prostate Cancer. This is an intramuscular/Subcutaneous injection given no more than every 12 weeks. This medication lowers the level of testosterone that your body produces. Some of the side effects include:     Hot Flashes *most common  Fatigue  Muscle and/or joint discomfort  Edema (swelling)  Diarrhea  Headache  Sexual Dysfunction/Decreased libido  Increased cholesterol/triglycerides  Increase in fasting blood glucose  Weight gain (decrease in lean body mass/increase in fat)  Anemia  Decreased bone density    You must quit smoking  Keep caffeine and alcohol consumption moderate  You should be doing regular weight bearing/resistance exercise  Take the following supplements:  Calcium: 8621-5533 mg daily in divided doses (for example calcium 600 mg by mouth twice daily or 500mg three times daily with meals)  Vitamin D3: 800-1000 IUs of daily in divided doses (for example vitamin D 400 IUs by mouth twice daily with the calcium, as vitamin d helps calcium absorption)    If you have any of the above side effects and they are bothersome or you just have questions, please give us a call. Lupron        Generic name: Leuprolide  Other trade names: [employer]®, [employer] uses generic names in all descriptions of drugs. Lupron, Eligard, Lupron Depot, and Viadur are trade names for Leuprolide. In some cases, health care professionals may use the trade names Lupron, Eligard, Lupron Depot, and Viadur when referring to the generic drug name Leuprolide. Drug type:  Lupron is a hormone therapy. It is classified as an [facility] agonist.\" (For more detail, see \"How this drug works\" section below). What This Drug Is Used For:  Prostate cancer  Breast, ovarian and endometrial cancer  Also used in non-cancerous conditions such as endometriosis, infertility, benign prostatic hypertrophy (BPH). Note:  If a drug has been approved for one use, physicians may elect to use this same drug for other problems if they believe it may be helpful. How This Drug Is Given:  As an injection under the skin (subcutaneous, SubQ), or into the muscle (intramuscular, IM). May be given as a daily, monthly, or every 3 or 4month injection depending on the formulation and condition being treated. Also may be given as a once-a-year implantable device (Viadur(tm)). The device looks like a one and 1/2 inch coffee stirrer. It is implanted under the skin. Positioned at one end of the device are \"osmotic tablets. \"  These tablets expand in the presence of water drawn in from the surrounding tissue at a constant and steady rate. As water is drawn in through this end, it exerts pressure inside the implant that steadily pushes the right amount of medication out of a small hole in the other end. The device is removed at the end of the year. There is no pill form of leuprolide. Your doctor will determine your dose and schedule.   Side Effects:  Important things to remember about the side effects of leuprolide:  Most people do not experience all of the side effects listed. Side effects are often predictable in terms of their onset and duration. Side effects are almost always reversible and will go away after treatment is complete. There are many options to help minimize or prevent side effects. There is no relationship between the presence or severity of side effects and the effectiveness of the medication. The following side effects are common (occurring in greater than 30%) for patients taking leuprolide: Hot flashes (see sexuality)  Loss of interest in sex (decreased libido) (see sexuality)  Inability to obtain or sustain an erection (impotence) (see sexuality)  These side effects are less common side effects (occurring in about 10-29%) of patients receiving leuprolide:  Increased bone pain or urinary retention due to disease \"flare\" during first couple weeks of treatment. Breast pain (see sexuality)  Discomfort at injection site  Blood test abnormalities - increased cholesterol levels  Swelling of feet or ankles (edema)  Weakness -general loss of strength  Swelling of the breasts (gynecomastia) (see sexuality)  Depression  Sweating  Leuprolide may cause short-term (within first 2 weeks of treatment) increases in testosterone serum levels. When this is used for prostate cancer the resulting \"tumor flare\" can cause temporary increase of bone pain, swelling of the prostate that blocks urine flow or swelling around tumor in the spine causing compression of the spinal cord. If you are noticing increased weakness, numbness or tingling in arms or legs, or difficulty with urination, report these symptoms to your health care provider immediately. Rare but significant side effects may include heart problems such as congestive heart failure (1%) or problems with blood clots (1%). Blood clots can lead to pulmonary embolus or stroke - potentially life-threatening conditions. Not all side effects are listed above.  Some that are rare (occurring in less than 10% of patients) are not listed here. However, you should always inform your health care provider if you experience any unusual symptoms. When to contact your doctor or health care provider:  Contact your health care provider immediately, day or night, if you should experience any of the following symptoms:  Urinary retention or inability to urinate  Weakness, numbness or tingling in arms or legs  The following symptoms require medical attention, but are not an emergency. Contact your health care provider within 24 hours of noticing any of the following:  Extreme fatigue (unable to carry on self-care activities)  Swelling of the feet or ankles. Sudden weight gain  Swelling, redness and/or pain in one leg or arm and not the other  Changes in mood or memory  Always inform your health care provider if you experience any unusual symptoms. Precautions:  Before starting leuprolide treatment, make sure you tell your doctor about any other medications you are taking (including prescription, over-the-counter, vitamins, herbal remedies, etc.). Do not take aspirin, products containing aspirin unless your doctor specifically permits this. Inform your health care professional if you are pregnant or may be pregnant prior to starting this treatment. Pregnancy category X (leuprolide may cause fetal harm when given to a pregnant woman. This drug must not be given to a pregnant woman or a woman who intends to become pregnant. If a woman becomes pregnant while taking leuprolide, the medication must be stopped immediately and the woman given appropriate counseling). For both men and women: Do not conceive a child (get pregnant) while taking leuprolide. Barrier methods of contraception, such as condoms, are recommended. Discuss with your doctor when you may safely become pregnant or conceive a child after therapy. Do not breast feed while taking this medication.   Self-Care Tips:  If you are experiencing hot flashes, wearing light clothing, staying in a cool environment, and putting cool cloths on your head may reduce symptoms. Consult your health care provider if these worsen, or become intolerable. Avoid sun exposure. Wear SPF 13 (or higher) sunblock and protective clothing. In general, drinking alcoholic beverages should be kept to a minimum or avoided completely. You should discuss this with your doctor. Get plenty of rest.   Maintain good nutrition. If you experience symptoms or side effects, be sure to discuss them with your health care team.  They can prescribe medications and/or offer other suggestions that are effective in managing such problems. Monitoring and Testing: You will be checked regularly by your health care professional while you are taking leuprolide, to monitor side effects and check your response to therapy. Periodic blood work to monitor your complete blood count (CBC) as well as the function of other organs (such as your kidneys and liver) may also be ordered by your doctor. How This Drug Works:  Hormones are chemical substances that are produced by glands in the body, which enter the bloodstream and cause effects in other tissues. For example, the hormone testosterone, made in the testicles and is responsible for male characteristics such as deepening voice and increased body hair. The use of hormone therapy to treat cancer is based on the observation that receptors for specific hormones that are needed for cell growth are on the surface of some tumor cells. Hormone therapy can work by stopping the production of a certain hormone, blocking hormone receptors, or substituting chemically similar agents for the active hormone, which cannot be used by the tumor cell. Different types of hormone therapies are categorized by their function and/or the type of hormone that is affected.   Leuprolide is classified as a leutinizing hormone releasing hormone [facility]) agonist. HRH agonists work by telling the pituitary gland located in the brain to stop producing leutinizing hormone, which (in men) stimulates the testicles to release testosterone and (in women) stimulates the ovaries to release estrogen. The drug does not have a direct effect on the cancer, only on the testicles or ovaries. The resulting lack of testosterone (in men) and estrogen (in women) interferes with stimulating cell growth in testosterone or estrogen dependent cancer cells. In treatment of prostate cancer LHRH agonists are often used together with anti-androgen medications. Anti-androgens are substances that block the effects of testosterone. Cancer of the prostate depends on the male hormone testosterone for its growth. If the amount of testosterone is reduced it is possible to slow down or shrink the cancer. Examples of anti-androgens are: bicalutamide, flutamide, nilutamide. Note:  We strongly encourage you to talk with your health care professional about your specific medical condition and treatments. The information contained in this website is meant to be helpful and educational, but is not a substitute for medical advice.

## 2022-12-21 LAB
TESTOST FREE SERPL-MCNC: 7.6 PG/ML (ref 6.6–18.1)
TESTOST SERPL-MCNC: 432 NG/DL (ref 264–916)

## 2022-12-23 ENCOUNTER — HOSPITAL ENCOUNTER (OUTPATIENT)
Dept: RADIATION ONCOLOGY | Age: 77
Setting detail: RECURRING SERIES
Discharge: HOME | End: 2022-12-26
Payer: MEDICARE

## 2023-01-03 ENCOUNTER — HOSPITAL ENCOUNTER (OUTPATIENT)
Dept: RADIATION ONCOLOGY | Age: 78
Setting detail: RECURRING SERIES
Discharge: HOME | End: 2023-01-06
Payer: MEDICARE

## 2023-01-03 PROCEDURE — 77300 RADIATION THERAPY DOSE PLAN: CPT

## 2023-01-03 PROCEDURE — 77301 RADIOTHERAPY DOSE PLAN IMRT: CPT

## 2023-01-03 PROCEDURE — 77399 UNLISTED PX MED RADJ PHYSICS: CPT

## 2023-01-03 PROCEDURE — 77338 DESIGN MLC DEVICE FOR IMRT: CPT

## 2023-01-09 ENCOUNTER — HOSPITAL ENCOUNTER (OUTPATIENT)
Dept: RADIATION ONCOLOGY | Age: 78
Setting detail: RECURRING SERIES
Discharge: HOME | End: 2023-01-12
Payer: MEDICARE

## 2023-01-09 PROCEDURE — 77385 HC NTSTY MODUL RAD TX DLVR SMPL: CPT

## 2023-01-10 ENCOUNTER — HOSPITAL ENCOUNTER (OUTPATIENT)
Dept: RADIATION ONCOLOGY | Age: 78
Setting detail: RECURRING SERIES
Discharge: HOME | End: 2023-01-13
Payer: MEDICARE

## 2023-01-10 PROCEDURE — 77385 HC NTSTY MODUL RAD TX DLVR SMPL: CPT

## 2023-01-11 ENCOUNTER — HOSPITAL ENCOUNTER (OUTPATIENT)
Dept: RADIATION ONCOLOGY | Age: 78
Setting detail: RECURRING SERIES
Discharge: HOME | End: 2023-01-14
Payer: MEDICARE

## 2023-01-11 PROCEDURE — 77300 RADIATION THERAPY DOSE PLAN: CPT

## 2023-01-11 PROCEDURE — 77385 HC NTSTY MODUL RAD TX DLVR SMPL: CPT

## 2023-01-12 ENCOUNTER — HOSPITAL ENCOUNTER (OUTPATIENT)
Dept: RADIATION ONCOLOGY | Age: 78
Setting detail: RECURRING SERIES
Discharge: HOME | End: 2023-01-15
Payer: MEDICARE

## 2023-01-12 PROCEDURE — 77338 DESIGN MLC DEVICE FOR IMRT: CPT

## 2023-01-12 PROCEDURE — 77385 HC NTSTY MODUL RAD TX DLVR SMPL: CPT

## 2023-01-13 ENCOUNTER — HOSPITAL ENCOUNTER (OUTPATIENT)
Dept: RADIATION ONCOLOGY | Age: 78
Setting detail: RECURRING SERIES
Discharge: HOME | End: 2023-01-16
Payer: MEDICARE

## 2023-01-13 VITALS
TEMPERATURE: 98.7 F | DIASTOLIC BLOOD PRESSURE: 97 MMHG | SYSTOLIC BLOOD PRESSURE: 180 MMHG | OXYGEN SATURATION: 99 % | WEIGHT: 179.6 LBS | HEART RATE: 61 BPM | BODY MASS INDEX: 25.77 KG/M2

## 2023-01-13 PROCEDURE — 77385 HC NTSTY MODUL RAD TX DLVR SMPL: CPT

## 2023-01-13 PROCEDURE — 77336 RADIATION PHYSICS CONSULT: CPT

## 2023-01-15 DIAGNOSIS — C61 PROSTATE CANCER (HCC): Primary | ICD-10-CM

## 2023-01-16 ENCOUNTER — HOSPITAL ENCOUNTER (OUTPATIENT)
Dept: RADIATION ONCOLOGY | Age: 78
Setting detail: RECURRING SERIES
Discharge: HOME | End: 2023-01-19
Payer: MEDICARE

## 2023-01-16 PROCEDURE — 77385 HC NTSTY MODUL RAD TX DLVR SMPL: CPT

## 2023-01-16 NOTE — PROGRESS NOTES
RADIATION ONCOLOGY ON-TREATMENT VISIT  1/13/2023     Diagnosis:  Cancer Staging  Prostate cancer [facility])  Staging form: Prostate, AJCC 8th Edition  - Clinical stage from 11/10/2022: Stage IIC (cT1c, cN0, cM0, PSA: 6.8, Grade Group: 4) - Signed by Miesha Pisano MD on 12/14/2022      This is a 68 y.o. male who is currently receiving definitive RT to the prostate/pelvis. Current RT dose: 10/46 Gy in 5/23 fractions. Boost of 32 Gy in 16 fx to prostate to follow. Concurrent ADT: Lupron 45 mg 12/19/2022    Subjective: Today, the patient reports doing well with RT thus far. No changes in urination or bowel habits. Objective:  Vitals:    01/13/23 0847   BP: (!) 180/97   Pulse: 61   Temp: 98.7 °F (37.1 °C)   TempSrc: Oral   SpO2: 99%   Weight: 179 lb 9.6 oz (81.5 kg)     Pain 0/10    General: Alert and conversant, in NAD    Assessment:  Patient is tolerating radiation therapy well. Plan:  -Continue RT as planned. -Treatment images reviewed. -Informed pt of my upcoming departure from this clinic and that his care will be transferred to another physician upon my departure.   -The patient has a documented plan of care to address pain. Pain is not present.       Miesha Pisano MD  1/13/2023

## 2023-01-17 ENCOUNTER — HOSPITAL ENCOUNTER (OUTPATIENT)
Dept: RADIATION ONCOLOGY | Age: 78
Setting detail: RECURRING SERIES
Discharge: HOME | End: 2023-01-20
Payer: MEDICARE

## 2023-01-17 PROCEDURE — 77385 HC NTSTY MODUL RAD TX DLVR SMPL: CPT

## 2023-01-18 ENCOUNTER — HOSPITAL ENCOUNTER (OUTPATIENT)
Dept: RADIATION ONCOLOGY | Age: 78
Setting detail: RECURRING SERIES
Discharge: HOME | End: 2023-01-21
Payer: MEDICARE

## 2023-01-18 PROCEDURE — 77385 HC NTSTY MODUL RAD TX DLVR SMPL: CPT

## 2023-01-19 ENCOUNTER — OFFICE VISIT (OUTPATIENT)
Dept: ORTHOPEDIC SURGERY | Age: 78
End: 2023-01-19

## 2023-01-19 ENCOUNTER — HOSPITAL ENCOUNTER (OUTPATIENT)
Dept: RADIATION ONCOLOGY | Age: 78
Setting detail: RECURRING SERIES
End: 2023-01-19
Payer: MEDICARE

## 2023-01-19 DIAGNOSIS — M54.16 LUMBAR RADICULOPATHY: Primary | ICD-10-CM

## 2023-01-19 PROCEDURE — 77385 HC NTSTY MODUL RAD TX DLVR SMPL: CPT

## 2023-01-20 ENCOUNTER — HOSPITAL ENCOUNTER (OUTPATIENT)
Dept: RADIATION ONCOLOGY | Age: 78
Setting detail: RECURRING SERIES
End: 2023-01-20
Payer: MEDICARE

## 2023-01-20 VITALS
TEMPERATURE: 97.4 F | HEART RATE: 66 BPM | WEIGHT: 179.6 LBS | DIASTOLIC BLOOD PRESSURE: 94 MMHG | OXYGEN SATURATION: 100 % | SYSTOLIC BLOOD PRESSURE: 156 MMHG | BODY MASS INDEX: 25.77 KG/M2

## 2023-01-20 PROCEDURE — 77336 RADIATION PHYSICS CONSULT: CPT

## 2023-01-20 PROCEDURE — 77385 HC NTSTY MODUL RAD TX DLVR SMPL: CPT

## 2023-01-20 NOTE — ONCOLOGY
RADIATION ONCOLOGY ON-TREATMENT VISIT  01/20/23    Diagnosis:  Cancer Staging  Prostate cancer [facility])  Staging form: Prostate, AJCC 8th Edition  - Clinical stage from 11/10/2022: Stage IIC (cT1c, cN0, cM0, PSA: 6.8, Grade Group: 4) - Signed by Sharyn Ceballos MD on 12/14/2022      This is a 68 y.o. male who is currently receiving pelvic radiation with planned prostate boost for primary treatment of prostate cancer. Current RT dose: 20/78 Gy in 10/39 fractions. Concurrent ADT:    Subjective: Today, the patient reports incomplete emptying, urgency, frequency. Objective:  Vitals:    01/20/23 1156   BP: (!) 156/94   Pulse: 66   Temp: 97.4 °F (36.3 °C)   TempSrc: Oral   SpO2: 100%   Weight: 179 lb 9.6 oz (81.5 kg)     Pain 2/10    General: Alert and conversant, in NAD  Skin:     Assessment:  Patient is tolerating radiation therapy well with significant bladder urgency/frequency and incomplete emptying    Plan:  -Currently on Flomax daily, instructed him to increase this to twice daily also suggested using OTC Azo over the weekend. If that does not improve his symptoms, will plan to start him on oxybutynin 5 mg daily to start, consider increased dose, monitoring his BP closely.  -Continue RT as planned. -Treatment images reviewed. -The patient has a documented plan of care to address pain. Pain is not present.       Dianne Phoenix, MD  01/20/23

## 2023-01-23 ENCOUNTER — APPOINTMENT (OUTPATIENT)
Dept: RADIATION ONCOLOGY | Age: 78
End: 2023-01-23
Payer: MEDICARE

## 2023-01-23 PROCEDURE — 77385 HC NTSTY MODUL RAD TX DLVR SMPL: CPT

## 2023-01-24 ENCOUNTER — APPOINTMENT (OUTPATIENT)
Dept: RADIATION ONCOLOGY | Age: 78
End: 2023-01-24
Payer: MEDICARE

## 2023-01-24 PROCEDURE — 77385 HC NTSTY MODUL RAD TX DLVR SMPL: CPT

## 2023-01-25 ENCOUNTER — APPOINTMENT (OUTPATIENT)
Dept: RADIATION ONCOLOGY | Age: 78
End: 2023-01-25
Payer: MEDICARE

## 2023-01-25 PROCEDURE — 77385 HC NTSTY MODUL RAD TX DLVR SMPL: CPT

## 2023-01-26 ENCOUNTER — APPOINTMENT (OUTPATIENT)
Dept: RADIATION ONCOLOGY | Age: 78
End: 2023-01-26
Payer: MEDICARE

## 2023-01-26 PROCEDURE — 77385 HC NTSTY MODUL RAD TX DLVR SMPL: CPT

## 2023-01-27 ENCOUNTER — CLINICAL DOCUMENTATION (OUTPATIENT)
Dept: RADIATION ONCOLOGY | Age: 78
End: 2023-01-27

## 2023-01-27 ENCOUNTER — APPOINTMENT (OUTPATIENT)
Dept: RADIATION ONCOLOGY | Age: 78
End: 2023-01-27
Payer: MEDICARE

## 2023-01-27 PROCEDURE — 77336 RADIATION PHYSICS CONSULT: CPT

## 2023-01-27 PROCEDURE — 77385 HC NTSTY MODUL RAD TX DLVR SMPL: CPT

## 2023-01-27 NOTE — PROGRESS NOTES
RADIATION ONCOLOGY ON-TREATMENT VISIT  01/27/23    Diagnosis:  Cancer Staging  Prostate cancer [facility])  Staging form: Prostate, AJCC 8th Edition  - Clinical stage from 11/10/2022: Stage IIC (cT1c, cN0, cM0, PSA: 6.8, Grade Group: 4) - Signed by Kamille Urrutia MD on 12/14/2022      This is a 68 y.o. male who is currently receiving pelvic radiation for prostate cancer. Current RT dose: 20/50 Gy in 10/25 fractions. Concurrent ADT:    Subjective: Today, the patient reports minimal symptoms. Objective: There were no vitals filed for this visit. Pain 0/10    General: Alert and conversant, in NAD  Skin:     Assessment:  Patient is tolerating radiation therapy well with minimal symptoms    Plan:  -Continue RT as planned. -Treatment images reviewed. -The patient has a documented plan of care to address pain. Pain is not present.       Neftali Mitchell MD  01/27/23

## 2023-01-30 ENCOUNTER — APPOINTMENT (OUTPATIENT)
Dept: RADIATION ONCOLOGY | Age: 78
End: 2023-01-30
Payer: MEDICARE

## 2023-01-30 PROCEDURE — 77385 HC NTSTY MODUL RAD TX DLVR SMPL: CPT

## 2023-01-31 ENCOUNTER — APPOINTMENT (OUTPATIENT)
Dept: RADIATION ONCOLOGY | Age: 78
End: 2023-01-31
Payer: MEDICARE

## 2023-02-01 ENCOUNTER — HOSPITAL ENCOUNTER (OUTPATIENT)
Dept: RADIATION ONCOLOGY | Age: 78
Setting detail: RECURRING SERIES
Discharge: HOME | End: 2023-02-04
Payer: MEDICARE

## 2023-02-01 PROCEDURE — 77385 HC NTSTY MODUL RAD TX DLVR SMPL: CPT

## 2023-02-02 ENCOUNTER — HOSPITAL ENCOUNTER (OUTPATIENT)
Dept: RADIATION ONCOLOGY | Age: 78
Setting detail: RECURRING SERIES
Discharge: HOME | End: 2023-02-05
Payer: MEDICARE

## 2023-02-02 PROCEDURE — 77385 HC NTSTY MODUL RAD TX DLVR SMPL: CPT

## 2023-02-03 ENCOUNTER — HOSPITAL ENCOUNTER (OUTPATIENT)
Dept: RADIATION ONCOLOGY | Age: 78
Setting detail: RECURRING SERIES
Discharge: HOME | End: 2023-02-06
Payer: MEDICARE

## 2023-02-03 ENCOUNTER — CLINICAL DOCUMENTATION (OUTPATIENT)
Dept: RADIATION ONCOLOGY | Age: 78
End: 2023-02-03

## 2023-02-03 PROCEDURE — 77385 HC NTSTY MODUL RAD TX DLVR SMPL: CPT

## 2023-02-03 NOTE — PROGRESS NOTES
RADIATION ONCOLOGY ON-TREATMENT VISIT  02/03/23    Diagnosis:  Cancer Staging  Prostate cancer [facility])  Staging form: Prostate, AJCC 8th Edition  - Clinical stage from 11/10/2022: Stage IIC (cT1c, cN0, cM0, PSA: 6.8, Grade Group: 4) - Signed by Steve Forte MD on 12/14/2022      This is a 68 y.o. male who is currently receiving pelvic radiation for prostate cancer. Current RT dose: 38/78 Gy in 19/39 fractions. Concurrent ADT:    Subjective: Today, the patient reports significant diarrhea after treatments, but this has improved after adding probiotics. He states that on the weekends he is fine. We discussed Imodium again. Objective: There were no vitals filed for this visit. Pain 2/10    General: Alert and conversant, in NAD  Skin:     Assessment:  Patient is tolerating radiation therapy well with manageable diarrhea. Plan:  -He completes his whole pelvis field next week, which should improve the GI symptoms.  -Continue RT as planned. -Treatment images reviewed. -The patient has a documented plan of care to address pain. Pain is controlled on current regimen.       Abundio Zapata MD  02/03/23

## 2023-02-06 ENCOUNTER — HOSPITAL ENCOUNTER (OUTPATIENT)
Dept: RADIATION ONCOLOGY | Age: 78
Setting detail: RECURRING SERIES
Discharge: HOME | End: 2023-02-09
Payer: MEDICARE

## 2023-02-06 PROCEDURE — 77385 HC NTSTY MODUL RAD TX DLVR SMPL: CPT

## 2023-02-06 PROCEDURE — 77336 RADIATION PHYSICS CONSULT: CPT

## 2023-02-07 ENCOUNTER — HOSPITAL ENCOUNTER (OUTPATIENT)
Dept: RADIATION ONCOLOGY | Age: 78
Setting detail: RECURRING SERIES
Discharge: HOME | End: 2023-02-10
Payer: MEDICARE

## 2023-02-07 PROCEDURE — 77385 HC NTSTY MODUL RAD TX DLVR SMPL: CPT

## 2023-02-08 ENCOUNTER — HOSPITAL ENCOUNTER (OUTPATIENT)
Dept: RADIATION ONCOLOGY | Age: 78
Setting detail: RECURRING SERIES
Discharge: HOME | End: 2023-02-11
Payer: MEDICARE

## 2023-02-08 PROCEDURE — 77385 HC NTSTY MODUL RAD TX DLVR SMPL: CPT

## 2023-02-10 ENCOUNTER — HOSPITAL ENCOUNTER (OUTPATIENT)
Dept: RADIATION ONCOLOGY | Age: 78
Setting detail: RECURRING SERIES
Discharge: HOME | End: 2023-02-13
Payer: MEDICARE

## 2023-02-10 ENCOUNTER — CLINICAL DOCUMENTATION (OUTPATIENT)
Dept: RADIATION ONCOLOGY | Age: 78
End: 2023-02-10

## 2023-02-10 VITALS
SYSTOLIC BLOOD PRESSURE: 170 MMHG | BODY MASS INDEX: 25.2 KG/M2 | HEART RATE: 61 BPM | DIASTOLIC BLOOD PRESSURE: 93 MMHG | WEIGHT: 175.6 LBS | OXYGEN SATURATION: 100 %

## 2023-02-10 PROCEDURE — 77385 HC NTSTY MODUL RAD TX DLVR SMPL: CPT

## 2023-02-10 NOTE — PROGRESS NOTES
RADIATION ONCOLOGY ON-TREATMENT VISIT  02/10/23    Diagnosis:  Cancer Staging  Prostate cancer [facility])  Staging form: Prostate, AJCC 8th Edition  - Clinical stage from 11/10/2022: Stage IIC (cT1c, cN0, cM0, PSA: 6.8, Grade Group: 4) - Signed by Oli James MD on 12/14/2022      This is a 68 y.o. male who is currently receiving pelvic radiation for prostate cancer. Current RT dose: 48/78 Gy in 24/39 fractions. Concurrent ADT:    Subjective: Today, the patient reports diarrhea in the morning after treatments, seems less over the weekend. Objective: There were no vitals filed for this visit. Pain 2/10    General: Alert and conversant, in NAD  Skin: No significant change    Assessment:  Patient is tolerating radiation therapy well with GI urgency    Plan:  -Starts reduced field boost today, which should help decrease bowel exposure/symptoms.  -Continue RT as planned. -Treatment images reviewed. -The patient has a documented plan of care to address pain. Pain is not present.       Ebony Pike MD  02/10/23

## 2023-02-13 ENCOUNTER — HOSPITAL ENCOUNTER (OUTPATIENT)
Dept: RADIATION ONCOLOGY | Age: 78
Setting detail: RECURRING SERIES
Discharge: HOME | End: 2023-02-16
Payer: MEDICARE

## 2023-02-13 PROCEDURE — 77385 HC NTSTY MODUL RAD TX DLVR SMPL: CPT

## 2023-02-14 ENCOUNTER — HOSPITAL ENCOUNTER (OUTPATIENT)
Dept: RADIATION ONCOLOGY | Age: 78
Setting detail: RECURRING SERIES
Discharge: HOME | End: 2023-02-17
Payer: MEDICARE

## 2023-02-14 PROCEDURE — 77385 HC NTSTY MODUL RAD TX DLVR SMPL: CPT

## 2023-02-14 PROCEDURE — 77336 RADIATION PHYSICS CONSULT: CPT

## 2023-02-15 ENCOUNTER — HOSPITAL ENCOUNTER (OUTPATIENT)
Dept: RADIATION ONCOLOGY | Age: 78
Setting detail: RECURRING SERIES
Discharge: HOME | End: 2023-02-18
Payer: MEDICARE

## 2023-02-15 PROCEDURE — 77385 HC NTSTY MODUL RAD TX DLVR SMPL: CPT

## 2023-02-16 ENCOUNTER — HOSPITAL ENCOUNTER (OUTPATIENT)
Dept: RADIATION ONCOLOGY | Age: 78
Setting detail: RECURRING SERIES
Discharge: HOME | End: 2023-02-19
Payer: MEDICARE

## 2023-02-16 PROCEDURE — 77385 HC NTSTY MODUL RAD TX DLVR SMPL: CPT

## 2023-02-17 ENCOUNTER — CLINICAL DOCUMENTATION (OUTPATIENT)
Dept: RADIATION ONCOLOGY | Age: 78
End: 2023-02-17

## 2023-02-17 ENCOUNTER — HOSPITAL ENCOUNTER (OUTPATIENT)
Dept: RADIATION ONCOLOGY | Age: 78
Setting detail: RECURRING SERIES
Discharge: HOME | End: 2023-02-20
Payer: MEDICARE

## 2023-02-17 VITALS
BODY MASS INDEX: 25.38 KG/M2 | DIASTOLIC BLOOD PRESSURE: 80 MMHG | OXYGEN SATURATION: 100 % | HEART RATE: 61 BPM | TEMPERATURE: 97.5 F | WEIGHT: 176.9 LBS | SYSTOLIC BLOOD PRESSURE: 149 MMHG

## 2023-02-17 PROCEDURE — 77385 HC NTSTY MODUL RAD TX DLVR SMPL: CPT

## 2023-02-17 ASSESSMENT — PAIN SCALES - GENERAL: PAINLEVEL_OUTOF10: 2

## 2023-02-17 NOTE — PROGRESS NOTES
RADIATION ONCOLOGY ON-TREATMENT VISIT  02/17/23    Diagnosis:  Cancer Staging  Prostate cancer [facility])  Staging form: Prostate, AJCC 8th Edition  - Clinical stage from 11/10/2022: Stage IIC (cT1c, cN0, cM0, PSA: 6.8, Grade Group: 4) - Signed by Perico Pro MD on 12/14/2022      This is a 68 y.o. male who is currently receiving pelvic radiation for prostate cancer. Current RT dose: 58/78 Gy in 29/39 fractions. Concurrent ADT:    Subjective: Today, the patient reports hemorrhoid type pain, 2/10. Voiding difficulty, stable with Flomax twice daily to. Objective: There were no vitals filed for this visit. Pain 2/10    General: Alert and conversant, in NAD  Skin: Modest erythema    Assessment:  Patient is tolerating radiation therapy well with hemorrhoid type symptoms and voiding difficulty, improved    Plan:  -Continue RT as planned. -Treatment images reviewed. -The patient has a documented plan of care to address pain. Pain is controlled on current regimen.       Alex Woodard MD  02/17/23

## 2023-02-20 ENCOUNTER — HOSPITAL ENCOUNTER (OUTPATIENT)
Dept: RADIATION ONCOLOGY | Age: 78
Setting detail: RECURRING SERIES
Discharge: HOME | End: 2023-02-23
Payer: MEDICARE

## 2023-02-20 PROCEDURE — 77385 HC NTSTY MODUL RAD TX DLVR SMPL: CPT

## 2023-02-21 ENCOUNTER — HOSPITAL ENCOUNTER (OUTPATIENT)
Dept: RADIATION ONCOLOGY | Age: 78
Setting detail: RECURRING SERIES
Discharge: HOME | End: 2023-02-24
Payer: MEDICARE

## 2023-02-21 PROCEDURE — 77336 RADIATION PHYSICS CONSULT: CPT

## 2023-02-21 PROCEDURE — 77385 HC NTSTY MODUL RAD TX DLVR SMPL: CPT

## 2023-02-22 ENCOUNTER — HOSPITAL ENCOUNTER (OUTPATIENT)
Dept: RADIATION ONCOLOGY | Age: 78
Setting detail: RECURRING SERIES
Discharge: HOME | End: 2023-02-25
Payer: MEDICARE

## 2023-02-22 PROCEDURE — 77385 HC NTSTY MODUL RAD TX DLVR SMPL: CPT

## 2023-02-23 ENCOUNTER — HOSPITAL ENCOUNTER (OUTPATIENT)
Dept: RADIATION ONCOLOGY | Age: 78
Setting detail: RECURRING SERIES
Discharge: HOME | End: 2023-02-26
Payer: MEDICARE

## 2023-02-23 PROCEDURE — 77385 HC NTSTY MODUL RAD TX DLVR SMPL: CPT

## 2023-02-24 ENCOUNTER — CLINICAL DOCUMENTATION (OUTPATIENT)
Dept: RADIATION ONCOLOGY | Age: 78
End: 2023-02-24

## 2023-02-24 ENCOUNTER — HOSPITAL ENCOUNTER (OUTPATIENT)
Dept: RADIATION ONCOLOGY | Age: 78
Setting detail: RECURRING SERIES
Discharge: HOME | End: 2023-02-27
Payer: MEDICARE

## 2023-02-24 VITALS
HEART RATE: 75 BPM | SYSTOLIC BLOOD PRESSURE: 145 MMHG | BODY MASS INDEX: 25 KG/M2 | DIASTOLIC BLOOD PRESSURE: 79 MMHG | TEMPERATURE: 97.5 F | OXYGEN SATURATION: 100 % | WEIGHT: 174.2 LBS

## 2023-02-24 PROCEDURE — 77385 HC NTSTY MODUL RAD TX DLVR SMPL: CPT

## 2023-02-24 NOTE — PROGRESS NOTES
RADIATION ONCOLOGY ON-TREATMENT VISIT  02/24/23    Diagnosis:  Cancer Staging  Prostate cancer [facility])  Staging form: Prostate, AJCC 8th Edition  - Clinical stage from 11/10/2022: Stage IIC (cT1c, cN0, cM0, PSA: 6.8, Grade Group: 4) - Signed by Elsa Tee MD on 12/14/2022      This is a 68 y.o. male who is currently receiving pelvic radiation for prostate cancer. Current RT dose: 68/78 Gy in 34/39 fractions. Concurrent ADT:    Subjective: Today, the patient reports intermittent episode of diarrhea, once this week. Objective: There were no vitals filed for this visit. Pain 0/10    General: Alert and conversant, in NAD  Skin: Modest erythema    Assessment:  Patient is tolerating radiation therapy well with intermittent diarrhea. He continues to use stool softener, but decreasing the dose. Plan:  -Complete next week  -Continue RT as planned. -Treatment images reviewed. -The patient has a documented plan of care to address pain. Pain is not present.       Merlene Loredo MD  02/24/23

## 2023-02-27 ENCOUNTER — HOSPITAL ENCOUNTER (OUTPATIENT)
Dept: RADIATION ONCOLOGY | Age: 78
Setting detail: RECURRING SERIES
Discharge: HOME | End: 2023-03-02
Payer: MEDICARE

## 2023-02-27 PROCEDURE — 77385 HC NTSTY MODUL RAD TX DLVR SMPL: CPT

## 2023-02-28 ENCOUNTER — HOSPITAL ENCOUNTER (OUTPATIENT)
Dept: RADIATION ONCOLOGY | Age: 78
Setting detail: RECURRING SERIES
Discharge: HOME | End: 2023-03-03
Payer: MEDICARE

## 2023-02-28 PROCEDURE — 77336 RADIATION PHYSICS CONSULT: CPT

## 2023-02-28 PROCEDURE — 77385 HC NTSTY MODUL RAD TX DLVR SMPL: CPT

## 2023-03-01 ENCOUNTER — HOSPITAL ENCOUNTER (OUTPATIENT)
Dept: RADIATION ONCOLOGY | Age: 78
Setting detail: RECURRING SERIES
Discharge: HOME | End: 2023-03-04
Payer: MEDICARE

## 2023-03-01 PROCEDURE — 77385 HC NTSTY MODUL RAD TX DLVR SMPL: CPT

## 2023-03-02 ENCOUNTER — HOSPITAL ENCOUNTER (OUTPATIENT)
Dept: RADIATION ONCOLOGY | Age: 78
Setting detail: RECURRING SERIES
Discharge: HOME | End: 2023-03-05
Payer: MEDICARE

## 2023-03-02 ENCOUNTER — APPOINTMENT (OUTPATIENT)
Dept: RADIATION ONCOLOGY | Age: 78
End: 2023-03-02
Payer: MEDICARE

## 2023-03-02 PROCEDURE — 77385 HC NTSTY MODUL RAD TX DLVR SMPL: CPT

## 2023-03-03 ENCOUNTER — CLINICAL DOCUMENTATION (OUTPATIENT)
Dept: RADIATION ONCOLOGY | Age: 78
End: 2023-03-03

## 2023-03-03 ENCOUNTER — HOSPITAL ENCOUNTER (OUTPATIENT)
Dept: RADIATION ONCOLOGY | Age: 78
Setting detail: RECURRING SERIES
Discharge: HOME | End: 2023-03-06
Payer: MEDICARE

## 2023-03-03 ENCOUNTER — APPOINTMENT (OUTPATIENT)
Dept: RADIATION ONCOLOGY | Age: 78
End: 2023-03-03
Payer: MEDICARE

## 2023-03-03 PROCEDURE — 77385 HC NTSTY MODUL RAD TX DLVR SMPL: CPT

## 2023-03-06 ENCOUNTER — APPOINTMENT (OUTPATIENT)
Dept: RADIATION ONCOLOGY | Age: 78
End: 2023-03-06
Payer: MEDICARE

## 2023-04-01 NOTE — PROGRESS NOTES
RADIATION ONCOLOGY ON-TREATMENT VISIT  03/03/23    Diagnosis:  Cancer Staging  Prostate cancer [facility])  Staging form: Prostate, AJCC 8th Edition  - Clinical stage from 11/10/2022: Stage IIC (cT1c, cN0, cM0, PSA: 6.8, Grade Group: 4) - Signed by Shaheed Negron MD on 12/14/2022      This is a 68 y.o. male who is currently receiving pelvic radiation for prostate cancer. Current RT dose: 78/78 Gy in 39/39 fractions. Concurrent ADT:    Subjective: Today, the patient reports slow stream, nocturia. No loose stools since discontinuing stool softener. Objective: There were no vitals filed for this visit. Pain 0/10    General: Alert and conversant, in NAD  Skin: Modest erythema    Assessment:  Patient is tolerating radiation therapy well with outlet obstructive symptoms, on Flomax twice daily    Plan:  -Completed radiation today  -Treatment images reviewed. -The patient has a documented plan of care to address pain. Pain is not present.       Kera Hidalgo MD  03/03/23 Him/He

## 2023-06-20 DIAGNOSIS — C61 PROSTATE CANCER (HCC): Primary | ICD-10-CM

## 2023-06-20 NOTE — PROGRESS NOTES
[facility]  [address] [facility], [address] [place] [address]  [phone]        Mr. Xavi Charles is a 68 y.o. male with a diagnosis of prostate cancer. S/P TRUS fusion prostate bx, PCa, kelvin score 4+4=8 on 11/10/22. Stage IIC (T1c, NO, MO, PSA 6.8, GG 4). S/p XRT 66 GY completed 3/3/23 with planned 2 yr of ADT. INTERVAL HISTORY: After completing his external beam radiation therapy on 3/3/2023. He also received his for 6-month Lupron injection on 12/19/2022. He is done quite well. He experienced some diarrhea and lower urinary tract symptoms soon after and during his radiation. Does have primarily resolved. He has some mild hot flashes at night but they have not been terribly bothersome. He still has some fatigue but that is improving. He is on Flomax 0.4 mg daily. He denies any hematuria or dysuria. From previous note:  Patient is here today for follow-up of his prostate cancer. He met with Dr. Ramin Rodriguez in radiation oncology and is set to have his CT simulation scan done later this week. He will then start external beam radiation therapy several weeks later. He is here today to start androgen deprivation therapy. Where planning on eight 2-year course of Lupron assuming he tolerates it well. If not he may elect to stop after 1 year. He has no complaints today. He is voiding well. From previous note:  Patient is here today for follow-up after his MRI fusion biopsy on 11/10/2022. His wife is present with him. He had Kelvin 4+4 = 8 and 30% of the tissue submitted from region of interest #1. Region of interest #2 showed Kelvin 4+3 equal 7 in 20% of the tissue. He then had 3 additional cores of [place] 4+3 equal 7 from the left side of his prostate. It ranged from less than 5% to 90% of tissue involved. He had a single positive right apex biopsy of [place] 3+3 less than 5%.     His his PSA after correction for being on finasteride

## 2023-06-23 ENCOUNTER — OFFICE VISIT (OUTPATIENT)
Dept: ONCOLOGY | Age: 78
End: 2023-06-23
Payer: MEDICARE

## 2023-06-23 ENCOUNTER — HOSPITAL ENCOUNTER (OUTPATIENT)
Dept: INFUSION THERAPY | Age: 78
Discharge: HOME | End: 2023-06-23
Payer: MEDICARE

## 2023-06-23 ENCOUNTER — HOSPITAL ENCOUNTER (OUTPATIENT)
Dept: LAB | Age: 78
End: 2023-06-23
Payer: MEDICARE

## 2023-06-23 VITALS
OXYGEN SATURATION: 98 % | BODY MASS INDEX: 25.13 KG/M2 | HEART RATE: 72 BPM | HEIGHT: 70 IN | DIASTOLIC BLOOD PRESSURE: 71 MMHG | RESPIRATION RATE: 14 BRPM | SYSTOLIC BLOOD PRESSURE: 99 MMHG | WEIGHT: 175.5 LBS

## 2023-06-23 DIAGNOSIS — C61 PROSTATE CANCER (HCC): ICD-10-CM

## 2023-06-23 DIAGNOSIS — C61 PROSTATE CANCER (HCC): Primary | ICD-10-CM

## 2023-06-23 LAB — PSA SERPL-MCNC: <0.1 NG/ML

## 2023-06-23 PROCEDURE — 84402 ASSAY OF FREE TESTOSTERONE: CPT

## 2023-06-23 PROCEDURE — 1123F ACP DISCUSS/DSCN MKR DOCD: CPT | Performed by: UROLOGY

## 2023-06-23 PROCEDURE — 6360000002 HC RX W HCPCS: Performed by: UROLOGY

## 2023-06-23 PROCEDURE — G8417 CALC BMI ABV UP PARAM F/U: HCPCS | Performed by: UROLOGY

## 2023-06-23 PROCEDURE — 4004F PT TOBACCO SCREEN RCVD TLK: CPT | Performed by: UROLOGY

## 2023-06-23 PROCEDURE — G8427 DOCREV CUR MEDS BY ELIG CLIN: HCPCS | Performed by: UROLOGY

## 2023-06-23 PROCEDURE — 84153 ASSAY OF PSA TOTAL: CPT

## 2023-06-23 PROCEDURE — 3078F DIAST BP <80 MM HG: CPT | Performed by: UROLOGY

## 2023-06-23 PROCEDURE — 84403 ASSAY OF TOTAL TESTOSTERONE: CPT

## 2023-06-23 PROCEDURE — 96402 CHEMO HORMON ANTINEOPL SQ/IM: CPT

## 2023-06-23 PROCEDURE — 36415 COLL VENOUS BLD VENIPUNCTURE: CPT

## 2023-06-23 PROCEDURE — 99213 OFFICE O/P EST LOW 20 MIN: CPT | Performed by: UROLOGY

## 2023-06-23 PROCEDURE — 3074F SYST BP LT 130 MM HG: CPT | Performed by: UROLOGY

## 2023-06-23 RX ORDER — ONDANSETRON 2 MG/ML
8 INJECTION INTRAMUSCULAR; INTRAVENOUS
Status: DISCONTINUED | OUTPATIENT
Start: 2023-06-23 | End: 2023-06-24 | Stop reason: HOSPADM

## 2023-06-23 RX ORDER — DIPHENHYDRAMINE HYDROCHLORIDE 50 MG/ML
50 INJECTION INTRAMUSCULAR; INTRAVENOUS
OUTPATIENT
Start: 2023-11-24

## 2023-06-23 RX ORDER — SODIUM CHLORIDE 9 MG/ML
INJECTION, SOLUTION INTRAVENOUS CONTINUOUS
Status: DISCONTINUED | OUTPATIENT
Start: 2023-06-23 | End: 2023-06-24 | Stop reason: HOSPADM

## 2023-06-23 RX ORDER — ACETAMINOPHEN 325 MG/1
650 TABLET ORAL
Status: DISCONTINUED | OUTPATIENT
Start: 2023-06-23 | End: 2023-06-24 | Stop reason: HOSPADM

## 2023-06-23 RX ORDER — ACETAMINOPHEN 325 MG/1
650 TABLET ORAL
OUTPATIENT
Start: 2023-11-24

## 2023-06-23 RX ORDER — DIPHENHYDRAMINE HYDROCHLORIDE 50 MG/ML
50 INJECTION INTRAMUSCULAR; INTRAVENOUS
Status: DISCONTINUED | OUTPATIENT
Start: 2023-06-23 | End: 2023-06-24 | Stop reason: HOSPADM

## 2023-06-23 RX ORDER — SODIUM CHLORIDE 9 MG/ML
INJECTION, SOLUTION INTRAVENOUS CONTINUOUS
OUTPATIENT
Start: 2023-11-24

## 2023-06-23 RX ORDER — ONDANSETRON 2 MG/ML
8 INJECTION INTRAMUSCULAR; INTRAVENOUS
OUTPATIENT
Start: 2023-11-24

## 2023-06-23 RX ORDER — EPINEPHRINE 1 MG/ML
0.3 INJECTION, SOLUTION, CONCENTRATE INTRAVENOUS PRN
Status: DISCONTINUED | OUTPATIENT
Start: 2023-06-23 | End: 2023-06-24 | Stop reason: HOSPADM

## 2023-06-23 RX ORDER — EPINEPHRINE 1 MG/ML
0.3 INJECTION, SOLUTION, CONCENTRATE INTRAVENOUS PRN
OUTPATIENT
Start: 2023-11-24

## 2023-06-23 RX ORDER — ALBUTEROL SULFATE 90 UG/1
4 AEROSOL, METERED RESPIRATORY (INHALATION) PRN
Status: DISCONTINUED | OUTPATIENT
Start: 2023-06-23 | End: 2023-06-24 | Stop reason: HOSPADM

## 2023-06-23 RX ORDER — ALBUTEROL SULFATE 90 UG/1
4 AEROSOL, METERED RESPIRATORY (INHALATION) PRN
OUTPATIENT
Start: 2023-11-24

## 2023-06-23 RX ADMIN — LEUPROLIDE ACETATE 45 MG: KIT at 09:13

## 2023-06-23 ASSESSMENT — ENCOUNTER SYMPTOMS
GASTROINTESTINAL NEGATIVE: 1
RESPIRATORY NEGATIVE: 1

## 2023-06-23 NOTE — PATIENT INSTRUCTIONS
Patient Instructions from Today's Visit    Reason for Visit:  Follow up     Diagnosis Information:  [email or web address]      Plan:  Continue with the lupron today     Follow Up: In 6 months with labs, office visit and lupron     Recent Lab Results:  Not resulted as of this note. Treatment Summary has been discussed and given to patient:   N/a      -------------------------------------------------------------------------------------------------------------------    Patient does express an interest in My Chart. My Chart log in information explained on the after visit summary printout at the [address] desk.     [facility]

## 2023-06-23 NOTE — PROGRESS NOTES
Arrived to the [facility]. Antonieta completed. Provided education on SE to report to MD    Patient instructed to report any side affects to ordering provider. Patient tolerated well. Any issues or concerns during appointment: none. Patient aware of next infusion appointment on 12/29/23 (date) at 0930 (time). Discharged ambulatory.

## 2023-06-24 LAB
TESTOST FREE SERPL-MCNC: ABNORMAL PG/ML
TESTOST SERPL-MCNC: 11 NG/DL (ref 264–916)

## 2023-06-26 LAB
TESTOST FREE SERPL-MCNC: 1.9 PG/ML (ref 6.6–18.1)
TESTOST SERPL-MCNC: 11 NG/DL (ref 264–916)

## 2023-08-29 ENCOUNTER — TELEPHONE (OUTPATIENT)
Dept: ORTHOPEDIC SURGERY | Age: 78
End: 2023-08-29

## 2023-08-29 DIAGNOSIS — M54.16 LUMBAR RADICULOPATHY: Primary | ICD-10-CM

## 2023-08-29 DIAGNOSIS — M51.36 DDD (DEGENERATIVE DISC DISEASE), LUMBAR: ICD-10-CM

## 2023-09-05 ENCOUNTER — HOSPITAL ENCOUNTER (OUTPATIENT)
Dept: RADIATION ONCOLOGY | Age: 78
Setting detail: RECURRING SERIES
Discharge: HOME | End: 2023-09-08
Payer: MEDICARE

## 2023-09-05 DIAGNOSIS — C61 PROSTATE CANCER (HCC): Primary | ICD-10-CM

## 2023-09-05 DIAGNOSIS — C61 PROSTATE CANCER (HCC): ICD-10-CM

## 2023-09-05 LAB — PSA SERPL-MCNC: <0.1 NG/ML

## 2023-09-05 PROCEDURE — 84153 ASSAY OF PSA TOTAL: CPT

## 2023-09-05 PROCEDURE — 36415 COLL VENOUS BLD VENIPUNCTURE: CPT

## 2023-09-05 PROCEDURE — 84403 ASSAY OF TOTAL TESTOSTERONE: CPT

## 2023-09-07 ENCOUNTER — OFFICE VISIT (OUTPATIENT)
Dept: ORTHOPEDIC SURGERY | Age: 78
End: 2023-09-07

## 2023-09-07 DIAGNOSIS — M51.36 DDD (DEGENERATIVE DISC DISEASE), LUMBAR: ICD-10-CM

## 2023-09-07 DIAGNOSIS — M54.16 LUMBAR RADICULOPATHY: Primary | ICD-10-CM

## 2023-09-07 LAB — TESTOST SERPL-MCNC: 6 NG/DL (ref 264–916)

## 2023-09-07 RX ORDER — TRIAMCINOLONE ACETONIDE 40 MG/ML
40 INJECTION, SUSPENSION INTRA-ARTICULAR; INTRAMUSCULAR ONCE
Status: COMPLETED | OUTPATIENT
Start: 2023-09-07 | End: 2023-09-07

## 2023-09-07 RX ADMIN — TRIAMCINOLONE ACETONIDE 40 MG: 40 INJECTION, SUSPENSION INTRA-ARTICULAR; INTRAMUSCULAR at 09:59

## 2023-09-07 NOTE — PROGRESS NOTES
Name: Olga Miguel  YOB: 1945  Gender: male  MRN: [id number]        Transforaminal RELL Procedure Note    Procedure: Right  L4-L5 transforaminal epidural steroid injections     Precautions: Olga Husbands denies prior sensitivity to steroid, local anesthetic, iodine, or shellfish. Consent:  Consent was obtained prior to the procedure. The procedure was discussed at length with Olga Husbands. He was given the opportunity to ask questions regarding the procedure and its associated risks. In addition to the potential risks associated with the procedure itself, the patient was informed both verbally and in writing of potential side effects of the use glucocorticoids. The patient appeared to comprehend the informed consent and desired to have the procedure performed, and informed consent was signed. He was placed in a prone position on the fluoroscopy table and the skin was prepped and draped in a routine sterile fashion. The areas to be injected were each anesthetized with 1 ml of 1% Lidocaine. A 22 gauge 3.5 inch spinal needle was carefully advanced under fluoroscopic guidance to the right L4-L5 transforaminal space  0.5 ml of 70% of Omnipaque was injected to confirm proper needle placement and absence of subdural or vascular flow Once proper placement was confirmed, 0.5ml of 0.25 marcaine and 40 mg kenalog were injected through the spinal needle. Fluoroscopic guidance was used intermittently over a 10-minute period to insure proper needle placement and his safety. A hard copy of the fluoroscopic image has been placed in his chart and is saved on the C-arm hard drive. He was monitored for 30 minutes after the procedure and discharged home in a stable fashion with a routine follow up. Procedural Diagnosis:     ICD-10-CM    1. Lumbar radiculopathy  M54.16 FL NERVE BLOCK LUMBOSACRAL 1ST     triamcinolone acetonide (KENALOG-40) injection 40 mg      2.  DDD

## 2023-09-12 ENCOUNTER — APPOINTMENT (OUTPATIENT)
Dept: RADIATION ONCOLOGY | Age: 78
End: 2023-09-12
Payer: MEDICARE

## 2023-09-15 NOTE — PROGRESS NOTES
Patient: Londell Ganser MRN: [id number]  SSN: [id number]    YOB: 1945  Age: 68 y.o. Sex: male      Other Providers:  Dr. Glenda Seaman    DIAGNOSIS: Stage IIC (cT1c, cN0, cM0, PSA: 6.8, Grade Group: 4)    PREVIOUS TREATMENT:   78 Gy in 39 fractions copmleted 3/3/23 - 2 lupron shots last in June. INTERVAL HISTORY:  Londell Ganser is a 68 y.o. male with hx of prostate cancer with RT completed 3/23. His PSA was therapeutic at <0.1 and testosterone was 6 on 9/5/23. He does endorse fatigue. He has some minor hot flashes at night but these are not bothersome. He has no concerns with memory, mood or concentration. He says that he has some urinary urgency if he waits too long. Otherwise, no issues with leaking unless he waits too long. He denies any dysuria or hematuria. He says he has no GI concerns.      AUA = 10      Wt Readings from Last 5 Encounters:   09/19/23 173 lb (78.5 kg)   06/23/23 175 lb 8 oz (79.6 kg)   02/24/23 174 lb 3.2 oz (79 kg)   02/17/23 176 lb 14.4 oz (80.2 kg)   02/10/23 175 lb 9.6 oz (79.7 kg)           MEDICATIONS:     Current Outpatient Medications:     Fluticasone-Salmeterol (WIXELA INHUB IN), Inhale 1 puff into the lungs in the morning and at bedtime, Disp: , Rfl:     Cholecalciferol (VITAMIN D-3) 25 MCG (1000 UT) CAPS, Take 1,000 Units by mouth daily, Disp: , Rfl:     acetaminophen (TYLENOL) 500 MG tablet, Take 1,000 mg by mouth every 4 hours as needed, Disp: , Rfl:     hydrocortisone 1 % cream, APPLY THIN FILM TO AFFECTED AREA TWICE A DAY ---EXTERNAL USE ONLY (Patient not taking: No sig reported), Disp: , Rfl:     atorvastatin (LIPITOR) 80 MG tablet, Take 80 mg by mouth daily, Disp: , Rfl:     chlorthalidone (HYGROTON) 25 MG tablet, Take 12.5 mg by mouth daily, Disp: , Rfl:     famotidine (PEPCID) 20 MG tablet, Take 40 mg by mouth daily, Disp: , Rfl:     finasteride (PROSCAR) 5 MG tablet, Take 5 mg by mouth daily, Disp: , Rfl:     tamsulosin (FLOMAX) 0.4 MG capsule, Take

## 2023-09-19 ENCOUNTER — HOSPITAL ENCOUNTER (OUTPATIENT)
Dept: RADIATION ONCOLOGY | Age: 78
Setting detail: RECURRING SERIES
Discharge: HOME | End: 2023-09-22
Payer: MEDICARE

## 2023-09-19 ENCOUNTER — APPOINTMENT (OUTPATIENT)
Dept: RADIATION ONCOLOGY | Age: 78
End: 2023-09-19
Payer: MEDICARE

## 2023-09-19 VITALS
HEART RATE: 68 BPM | TEMPERATURE: 97.9 F | SYSTOLIC BLOOD PRESSURE: 127 MMHG | WEIGHT: 173 LBS | DIASTOLIC BLOOD PRESSURE: 77 MMHG | OXYGEN SATURATION: 98 % | BODY MASS INDEX: 24.82 KG/M2

## 2023-09-19 DIAGNOSIS — C61 PROSTATE CANCER (HCC): Primary | ICD-10-CM

## 2023-09-19 PROCEDURE — 99211 OFF/OP EST MAY X REQ PHY/QHP: CPT

## 2023-09-19 ASSESSMENT — PATIENT HEALTH QUESTIONNAIRE - PHQ9
SUM OF ALL RESPONSES TO PHQ QUESTIONS 1-9: 0
1. LITTLE INTEREST OR PLEASURE IN DOING THINGS: 0
2. FEELING DOWN, DEPRESSED OR HOPELESS: 0
SUM OF ALL RESPONSES TO PHQ QUESTIONS 1-9: 0
SUM OF ALL RESPONSES TO PHQ9 QUESTIONS 1 & 2: 0

## 2023-09-19 NOTE — PROGRESS NOTES
6 Month Follow Up (Prostate): Lab Review  -PSA, Test Completed on: 09/05/2023  -RT End: 03/03/2023  -Med. Onc (DR. Nicolle Lu) for Anemia  -Urology/Oncology (Dr. Brandi Nava) for Lupron  -Next Lupron scheduled for 12/29/2023    Patient presents today with no complaints.     Mary Camacho MA

## 2023-10-03 NOTE — PROGRESS NOTES
NEW PATIENT INTAKE    Referral Diagnosis: Anemia, unspecified    Referring Provider: Joel Young MD    Primary Care Provider: KATHRYN Mccarty - CNP    Family History of Cancer/ Hematology Disorders: No known family history    Presenting Symptoms: Anemia    Chronological History of Pertinent Events:     66-year-old white male    Patient seen by Mandy Wong MD for prostate cancer [facility])  Patient was last seen by Dr. Nick Higginbotham on 6/23/23. Ordered a PSA, t. test and Lupron today. RTC in 6 months with repeat labs, OV and Lupron injection. 7/20/23 - Abnormal labs: (Proficient)  RBC - 3.38  Hgb - 12.0  Hct - 34.70  MCV - 102.4  MCV - 102.4  MCH - 35.50  Platelets - 306  Hgb A1C - 5.80  (U/A was normal)    8/7/23 - Met with PCP  Abnormal labs (7/27/23): (Proficient)  RBC - 3.35  Hgb - 12.0  Hct - 34.80  MCV - 103.70  Platelets - 804  Mon SP - 1.1    8/10/23 - Noted by PCP from [facility] ([address])  Noted: Anemia with abnormal electrophoresis    A referral has been placed with [facility] for a Medical Hematology consultation and treatment.     (Proficient)                                                Notes from Referring Provider: N/A    Presented at Tumor Board: No    Other Pertinent Information: N/A

## 2023-10-04 ENCOUNTER — HOSPITAL ENCOUNTER (OUTPATIENT)
Dept: LAB | Age: 78
Discharge: HOME | End: 2023-10-07
Payer: MEDICARE

## 2023-10-04 ENCOUNTER — OFFICE VISIT (OUTPATIENT)
Dept: ONCOLOGY | Age: 78
End: 2023-10-04
Payer: MEDICARE

## 2023-10-04 VITALS
RESPIRATION RATE: 12 BRPM | SYSTOLIC BLOOD PRESSURE: 132 MMHG | DIASTOLIC BLOOD PRESSURE: 79 MMHG | HEIGHT: 70 IN | TEMPERATURE: 97.2 F | BODY MASS INDEX: 24.73 KG/M2 | OXYGEN SATURATION: 98 % | WEIGHT: 172.7 LBS | HEART RATE: 80 BPM

## 2023-10-04 DIAGNOSIS — D64.9 ANEMIA, UNSPECIFIED TYPE: ICD-10-CM

## 2023-10-04 DIAGNOSIS — D47.2 MGUS (MONOCLONAL GAMMOPATHY OF UNKNOWN SIGNIFICANCE): Primary | ICD-10-CM

## 2023-10-04 LAB
ALBUMIN SERPL-MCNC: 4 G/DL (ref 3.2–4.6)
ALBUMIN/GLOB SERPL: 1 (ref 0.4–1.6)
ALP SERPL-CCNC: 72 U/L (ref 50–136)
ALT SERPL-CCNC: 49 U/L (ref 12–65)
ANION GAP SERPL CALC-SCNC: 6 MMOL/L (ref 2–11)
AST SERPL-CCNC: 29 U/L (ref 15–37)
BASOPHILS # BLD: 0 K/UL (ref 0–0.2)
BASOPHILS NFR BLD: 0 % (ref 0–2)
BILIRUB SERPL-MCNC: 0.5 MG/DL (ref 0.2–1.1)
BUN SERPL-MCNC: 27 MG/DL (ref 8–23)
CALCIUM SERPL-MCNC: 9.2 MG/DL (ref 8.3–10.4)
CHLORIDE SERPL-SCNC: 105 MMOL/L (ref 101–110)
CO2 SERPL-SCNC: 27 MMOL/L (ref 21–32)
CREAT SERPL-MCNC: 1.6 MG/DL (ref 0.8–1.5)
DIFFERENTIAL METHOD BLD: ABNORMAL
EOSINOPHIL # BLD: 0 K/UL (ref 0–0.8)
EOSINOPHIL NFR BLD: 1 % (ref 0.5–7.8)
ERYTHROCYTE [DISTWIDTH] IN BLOOD BY AUTOMATED COUNT: 15.8 % (ref 11.9–14.6)
FERRITIN SERPL-MCNC: 223 NG/ML (ref 8–388)
GLOBULIN SER CALC-MCNC: 4.1 G/DL (ref 2.8–4.5)
GLUCOSE SERPL-MCNC: 114 MG/DL (ref 65–100)
HCT VFR BLD AUTO: 34.3 % (ref 41.1–50.3)
HGB BLD-MCNC: 11.5 G/DL (ref 13.6–17.2)
HGB RETIC QN AUTO: 39 PG (ref 29–35)
IMM GRANULOCYTES # BLD AUTO: 0 K/UL (ref 0–0.5)
IMM GRANULOCYTES NFR BLD AUTO: 0 % (ref 0–5)
IMM RETICS NFR: 15.2 % (ref 2.3–13.4)
IRON SATN MFR SERPL: 39 %
IRON SERPL-MCNC: 156 UG/DL (ref 35–150)
LDH SERPL L TO P-CCNC: 183 U/L (ref 110–210)
LYMPHOCYTES # BLD: 1 K/UL (ref 0.5–4.6)
LYMPHOCYTES NFR BLD: 20 % (ref 13–44)
MCH RBC QN AUTO: 34.8 PG (ref 26.1–32.9)
MCHC RBC AUTO-ENTMCNC: 33.5 G/DL (ref 31.4–35)
MCV RBC AUTO: 103.9 FL (ref 82–102)
MONOCYTES # BLD: 0.4 K/UL (ref 0.1–1.3)
MONOCYTES NFR BLD: 9 % (ref 4–12)
NEUTS SEG # BLD: 3.4 K/UL (ref 1.7–8.2)
NEUTS SEG NFR BLD: 71 % (ref 43–78)
NRBC # BLD: 0 K/UL (ref 0–0.2)
PLATELET # BLD AUTO: 155 K/UL (ref 150–450)
PMV BLD AUTO: 9.7 FL (ref 9.4–12.3)
POTASSIUM SERPL-SCNC: 3.7 MMOL/L (ref 3.5–5.1)
PROT SERPL-MCNC: 8.1 G/DL (ref 6.3–8.2)
RBC # BLD AUTO: 3.3 M/UL (ref 4.23–5.6)
RETICS # AUTO: 0.06 M/UL (ref 0.03–0.1)
RETICS/RBC NFR AUTO: 1.7 % (ref 0.3–2)
SODIUM SERPL-SCNC: 138 MMOL/L (ref 133–143)
TIBC SERPL-MCNC: 401 UG/DL (ref 250–450)
WBC # BLD AUTO: 4.8 K/UL (ref 4.3–11.1)

## 2023-10-04 PROCEDURE — 82784 ASSAY IGA/IGD/IGG/IGM EACH: CPT

## 2023-10-04 PROCEDURE — 4004F PT TOBACCO SCREEN RCVD TLK: CPT | Performed by: INTERNAL MEDICINE

## 2023-10-04 PROCEDURE — 86334 IMMUNOFIX E-PHORESIS SERUM: CPT

## 2023-10-04 PROCEDURE — 3074F SYST BP LT 130 MM HG: CPT | Performed by: INTERNAL MEDICINE

## 2023-10-04 PROCEDURE — 83521 IG LIGHT CHAINS FREE EACH: CPT

## 2023-10-04 PROCEDURE — 84238 ASSAY NONENDOCRINE RECEPTOR: CPT

## 2023-10-04 PROCEDURE — 83540 ASSAY OF IRON: CPT

## 2023-10-04 PROCEDURE — 80053 COMPREHEN METABOLIC PANEL: CPT

## 2023-10-04 PROCEDURE — G8420 CALC BMI NORM PARAMETERS: HCPCS | Performed by: INTERNAL MEDICINE

## 2023-10-04 PROCEDURE — G8428 CUR MEDS NOT DOCUMENT: HCPCS | Performed by: INTERNAL MEDICINE

## 2023-10-04 PROCEDURE — 82728 ASSAY OF FERRITIN: CPT

## 2023-10-04 PROCEDURE — 3078F DIAST BP <80 MM HG: CPT | Performed by: INTERNAL MEDICINE

## 2023-10-04 PROCEDURE — 84165 PROTEIN E-PHORESIS SERUM: CPT

## 2023-10-04 PROCEDURE — 99204 OFFICE O/P NEW MOD 45 MIN: CPT | Performed by: INTERNAL MEDICINE

## 2023-10-04 PROCEDURE — 85046 RETICYTE/HGB CONCENTRATE: CPT

## 2023-10-04 PROCEDURE — 1123F ACP DISCUSS/DSCN MKR DOCD: CPT | Performed by: INTERNAL MEDICINE

## 2023-10-04 PROCEDURE — 83550 IRON BINDING TEST: CPT

## 2023-10-04 PROCEDURE — 36415 COLL VENOUS BLD VENIPUNCTURE: CPT

## 2023-10-04 PROCEDURE — 85025 COMPLETE CBC W/AUTO DIFF WBC: CPT

## 2023-10-04 PROCEDURE — G8484 FLU IMMUNIZE NO ADMIN: HCPCS | Performed by: INTERNAL MEDICINE

## 2023-10-04 PROCEDURE — 83615 LACTATE (LD) (LDH) ENZYME: CPT

## 2023-10-04 ASSESSMENT — PATIENT HEALTH QUESTIONNAIRE - PHQ9
2. FEELING DOWN, DEPRESSED OR HOPELESS: 0
SUM OF ALL RESPONSES TO PHQ QUESTIONS 1-9: 0
SUM OF ALL RESPONSES TO PHQ9 QUESTIONS 1 & 2: 0
SUM OF ALL RESPONSES TO PHQ QUESTIONS 1-9: 0
SUM OF ALL RESPONSES TO PHQ QUESTIONS 1-9: 0
1. LITTLE INTEREST OR PLEASURE IN DOING THINGS: 0
SUM OF ALL RESPONSES TO PHQ QUESTIONS 1-9: 0

## 2023-10-04 NOTE — PROGRESS NOTES
[facility] On[facility]: Office Visit New Patient H & P    Chief Complaint:    Chief Complaint   Patient presents with    New Patient         History of Present Illness:  Mr. Theron Carreno is a 68 y.o. male who presents today for evaluation regarding anemia. He is a patient of Dr. Estefania Trujillo and Dr. Ronnie Qureshi for localized prostate cancer, s/p EBRT with ongoing ADT as well. His Hgb was noted to be down to 12.0 and he had an abnormal SPEP noted in the [place] system, his M-spike was 1.1 with normal calcium, creatinine of 1.4 and Hgb of 12. He is now referred to hematology for recommendations. His only complaint is fatigue. Review of Systems:  Constitutional: Negative. HENT: Negative. Eyes: Negative. Respiratory: Negative. Cardiovascular: Negative. Gastrointestinal: Negative. Genitourinary: Negative. Musculoskeletal: Negative. Skin: Negative. Neurological: Negative. Endo/Heme/Allergies: Negative. Psychiatric/Behavioral: Negative. All other systems reviewed and are negative. Allergies   Allergen Reactions    Sulfa Antibiotics Other (See Comments)     headaches     Past Medical History:   Diagnosis Date    Abdominal aneurysm without mention of rupture 9/30/2015    [id number], on 5/1/2012 per [facility] screen    Benign localized hyperplasia of prostate without urinary obstruction and other lower urinary tract symptoms (LUTS) 9/30/2015    V. A. urologist    Bladder neck obstruction 9/30/2015    Cancer ([address]) 02/2017    melanoma, rt ear    COPD (chronic obstructive pulmonary disease) (HCC)     FH: colonic polyps     GERD (gastroesophageal reflux disease)     H/O testicular mass 9/30/2015    V. A.  Working up     [address] [place] [address] (hyperlipidemia)     Hypertension     controlled w/ med    Inguinal hernia with obstruction, without mention of gangrene, recurrent unilateral or unspecified 9/30/2015    Other and unspecified hyperlipidemia 9/30/2015    Other ill-defined conditions(799.89)     high chol    Presbyopia

## 2023-10-04 NOTE — PATIENT INSTRUCTIONS
Patient Instructions from Today's Visit    Reason for Visit:  New Patient Anemia     Plan:  History discussed. We will do extra lab work today. Follow Up: Follow up in 3 months. Recent Lab Results: We will do labs after your visit. Treatment Summary has been discussed and given to patient:   N/A    -------------------------------------------------------------------------------------------------------------------  Please call our office at [phone] if you have any  of the following symptoms:   Fever of 100.5 or greater  Chills  Shortness of breath  Swelling or pain in one leg    After office hours an answering service is available and will contact a provider for emergencies or if you are experiencing any of the above symptoms. Patient does express an interest in My Chart. My Chart log in information explained on the after visit summary printout at the [address] desk.     Chris Zavala RN

## 2023-10-05 LAB
KAPPA LC FREE SER-MCNC: 122.1 MG/L (ref 3.3–19.4)
KAPPA LC FREE/LAMBDA FREE SER: 8.91 (ref 0.26–1.65)
LAMBDA LC FREE SERPL-MCNC: 13.7 MG/L (ref 5.7–26.3)
TRANSFERRIN SERPL-SCNC: 17.5 NMOL/L (ref 12.2–27.3)

## 2023-10-09 LAB
ALBUMIN SERPL ELPH-MCNC: 3.7 G/DL (ref 2.9–4.4)
ALBUMIN/GLOB SERPL: 1.1 (ref 0.7–1.7)
ALPHA1 GLOB SERPL ELPH-MCNC: 0.2 G/DL (ref 0–0.4)
ALPHA2 GLOB SERPL ELPH-MCNC: 0.8 G/DL (ref 0.4–1)
B-GLOBULIN SERPL ELPH-MCNC: 1 G/DL (ref 0.7–1.3)
GAMMA GLOB SERPL ELPH-MCNC: 1.5 G/DL (ref 0.4–1.8)
GLOBULIN SER-MCNC: 3.5 G/DL (ref 2.2–3.9)
IGA SERPL-MCNC: 99 MG/DL (ref 61–437)
IGG SERPL-MCNC: 1696 MG/DL (ref 603–1613)
IGM SERPL-MCNC: 76 MG/DL (ref 15–143)
INTERPRETATION SERPL IEP-IMP: ABNORMAL
M PROTEIN SERPL ELPH-MCNC: 1.2 G/DL
PROT SERPL-MCNC: 7.2 G/DL (ref 6–8.5)

## 2023-11-20 ENCOUNTER — TELEPHONE (OUTPATIENT)
Dept: ORTHOPEDIC SURGERY | Age: 78
End: 2023-11-20

## 2023-11-20 DIAGNOSIS — M47.816 SPONDYLOSIS WITHOUT MYELOPATHY OR RADICULOPATHY, LUMBAR REGION: ICD-10-CM

## 2023-11-20 DIAGNOSIS — M54.16 LUMBAR RADICULOPATHY: Primary | ICD-10-CM

## 2023-11-20 DIAGNOSIS — M47.816 LUMBAR SPONDYLOSIS: ICD-10-CM

## 2023-11-20 DIAGNOSIS — M48.062 SPINAL STENOSIS OF LUMBAR REGION WITH NEUROGENIC CLAUDICATION: ICD-10-CM

## 2023-11-20 DIAGNOSIS — M51.36 DDD (DEGENERATIVE DISC DISEASE), LUMBAR: ICD-10-CM

## 2023-11-20 NOTE — TELEPHONE ENCOUNTER
Kiran Duvall Pt  says  he  wants  the  same  type  of injection  as last  time  and it  is  in his hip.     He  is  scheduled  for  11/30  at [facility]

## 2023-11-27 ENCOUNTER — TELEMEDICINE (OUTPATIENT)
Dept: INTERNAL MEDICINE CLINIC | Facility: CLINIC | Age: 78
End: 2023-11-27
Payer: MEDICARE

## 2023-11-27 DIAGNOSIS — U07.1 COVID: Primary | ICD-10-CM

## 2023-11-27 DIAGNOSIS — N18.32 STAGE 3B CHRONIC KIDNEY DISEASE (HCC): ICD-10-CM

## 2023-11-27 DIAGNOSIS — C61 PROSTATE CANCER (HCC): ICD-10-CM

## 2023-11-27 PROCEDURE — 99213 OFFICE O/P EST LOW 20 MIN: CPT | Performed by: INTERNAL MEDICINE

## 2023-11-27 PROCEDURE — 1123F ACP DISCUSS/DSCN MKR DOCD: CPT | Performed by: INTERNAL MEDICINE

## 2023-11-27 PROCEDURE — G8427 DOCREV CUR MEDS BY ELIG CLIN: HCPCS | Performed by: INTERNAL MEDICINE

## 2023-11-27 ASSESSMENT — ENCOUNTER SYMPTOMS
SHORTNESS OF BREATH: 0
WHEEZING: 0
COUGH: 0

## 2023-11-27 NOTE — PROGRESS NOTES
He tested positive for Covid after finding out his wife has been sick. Past Medical History, Past Surgical History, Family history, Social History, and Medications were all reviewed with the patient today and updated as necessary. Current Outpatient Medications   Medication Sig Dispense Refill    albuterol-ipratropium (COMBIVENT RESPIMAT)  MCG/ACT AERS inhaler INHALE ONE INHALATION BY MOUTH FOUR TIMES A DAY      Cholecalciferol (VITAMIN D-3) 25 MCG (1000 UT) CAPS Take 1,000 Units by mouth daily      acetaminophen (TYLENOL) 500 MG tablet Take 2 tablets by mouth every 4 hours as needed      atorvastatin (LIPITOR) 80 MG tablet Take 1 tablet by mouth daily      chlorthalidone (HYGROTON) 25 MG tablet Take 0.5 tablets by mouth daily      famotidine (PEPCID) 20 MG tablet Take 2 tablets by mouth daily      finasteride (PROSCAR) 5 MG tablet Take 1 tablet by mouth daily      tamsulosin (FLOMAX) 0.4 MG capsule Take 1 capsule by mouth daily       No current facility-administered medications for this visit. Allergies   Allergen Reactions    Sulfa Antibiotics Other (See Comments)     headaches     Patient Active Problem List   Diagnosis    Inguinal hernia, left    Bladder neck obstruction    H/O testicular mass    Elevated prostate specific antigen (PSA)    Prediabetes    Primary hypertension    Gastroesophageal reflux disease    Abdominal aortic aneurysm (AAA) 3.0 cm to 5.5 cm in diameter in male [facility])    Stage 3b chronic kidney disease (HCC)    Sciatica of right side    DDD (degenerative disc disease), lumbar    History of melanoma    History of basal cell carcinoma (BCC)    History of squamous cell carcinoma    Primary osteoarthritis involving multiple joints    Pure hypercholesterolemia    Macrocytosis    History of anemia    Prostate cancer ([address])    COVID         Review of Systems   Constitutional:  Negative for appetite change, chills and fever.    Respiratory:  Negative for cough, shortness of

## 2023-11-28 ENCOUNTER — TELEPHONE (OUTPATIENT)
Dept: INTERNAL MEDICINE CLINIC | Facility: CLINIC | Age: 78
End: 2023-11-28

## 2023-11-28 DIAGNOSIS — U07.1 COVID: ICD-10-CM

## 2023-11-28 NOTE — TELEPHONE ENCOUNTER
Pt called stating he now has headache, chills (temp 98.7), body aches, cough, SOB (95%), denies nausea and vomiting. Wants rx for Paxlovid. Dr. TOPETE [facility] sent to [facility] on [address] as requested. Pt agreed to call back with any worsening symptoms.

## 2023-12-04 ENCOUNTER — HOSPITAL ENCOUNTER (EMERGENCY)
Age: 78
Discharge: HOME | End: 2023-12-04
Attending: EMERGENCY MEDICINE
Payer: MEDICARE

## 2023-12-04 ENCOUNTER — APPOINTMENT (OUTPATIENT)
Dept: GENERAL RADIOLOGY | Age: 78
End: 2023-12-04
Payer: MEDICARE

## 2023-12-04 VITALS
DIASTOLIC BLOOD PRESSURE: 75 MMHG | RESPIRATION RATE: 20 BRPM | HEIGHT: 70 IN | TEMPERATURE: 97.6 F | OXYGEN SATURATION: 98 % | BODY MASS INDEX: 24.34 KG/M2 | WEIGHT: 170 LBS | SYSTOLIC BLOOD PRESSURE: 117 MMHG | HEART RATE: 65 BPM

## 2023-12-04 DIAGNOSIS — S42.021A CLOSED DISPLACED FRACTURE OF SHAFT OF RIGHT CLAVICLE, INITIAL ENCOUNTER: Primary | ICD-10-CM

## 2023-12-04 PROCEDURE — 73030 X-RAY EXAM OF SHOULDER: CPT

## 2023-12-04 PROCEDURE — 99283 EMERGENCY DEPT VISIT LOW MDM: CPT

## 2023-12-04 RX ORDER — TRAMADOL HYDROCHLORIDE 50 MG/1
50 TABLET ORAL 3 TIMES DAILY PRN
Qty: 11 TABLET | Refills: 0 | Status: SHIPPED | OUTPATIENT
Start: 2023-12-04 | End: 2023-12-07

## 2023-12-04 ASSESSMENT — PAIN - FUNCTIONAL ASSESSMENT: PAIN_FUNCTIONAL_ASSESSMENT: 0-10

## 2023-12-04 ASSESSMENT — PAIN DESCRIPTION - DESCRIPTORS: DESCRIPTORS: ACHING

## 2023-12-04 ASSESSMENT — PAIN SCALES - GENERAL
PAINLEVEL_OUTOF10: 8
PAINLEVEL_OUTOF10: 8

## 2023-12-04 ASSESSMENT — ENCOUNTER SYMPTOMS
CHEST TIGHTNESS: 0
COLOR CHANGE: 1

## 2023-12-04 ASSESSMENT — PAIN DESCRIPTION - LOCATION: LOCATION: SHOULDER

## 2023-12-04 NOTE — ED PROVIDER NOTES
tablet Take 2 tablets by mouth every 4 hours as neededHistorical Med      atorvastatin (LIPITOR) 80 MG tablet Take 1 tablet by mouth dailyHistorical Med      chlorthalidone (HYGROTON) 25 MG tablet Take 0.5 tablets by mouth dailyHistorical Med      famotidine (PEPCID) 20 MG tablet Take 2 tablets by mouth dailyHistorical Med      finasteride (PROSCAR) 5 MG tablet Take 1 tablet by mouth dailyHistorical Med      tamsulosin (FLOMAX) 0.4 MG capsule Take 1 capsule by mouth dailyHistorical Med              Results for orders placed or performed during the hospital encounter of 12/04/23   XR SHOULDER RIGHT (MIN 2 VIEWS)    Narrative    Right Shoulder     INDICATION: Shoulder pain    Three views of the right shoulder were obtained     FINDINGS: Distal clavicle fracture with one half shaft width of superior  displacement of the distal clavicle. The acromioclavicular joint does not appear  substantially widened or , however there is mild acromioclavicular  arthrosis. No other fractures are identified. Impression    1. Mildly displaced distal clavicle fracture which demonstrates a half shaft  width of superior displacement of the distal fragment without evidence for  involvement of the adjacent acromioclavicular joint. 2. Mild acromioclavicular arthrosis. XR SHOULDER RIGHT (MIN 2 VIEWS)   Final Result   1. Mildly displaced distal clavicle fracture which demonstrates a half shaft   width of superior displacement of the distal fragment without evidence for   involvement of the adjacent acromioclavicular joint. 2. Mild acromioclavicular arthrosis. Voice dictation software was used during the making of this note. This software is not perfect and grammatical and other typographical errors may be present. This note has not been completely proofread for errors.         Mallory Milner MD  12/04/23 [id number]

## 2023-12-04 NOTE — DISCHARGE INSTR - COC
Elimination:  Continence: Bowel: {YES / FM:19452}  Bladder: {YES / OE:92199}  Urinary Catheter: {Urinary Catheter:377218926}   Colostomy/Ileostomy/Ileal Conduit: {YES / BT:96132}       Date of Last BM: ***  No intake or output data in the 24 hours ending 23 1352  No intake/output data recorded.     Safety Concerns:     [address] [place] [address] Safety Concerns:163355904}    Impairments/Disabilities:      [address] [place] [address] Impairments/Disabilities:307501796}    Nutrition Therapy:  Current Nutrition Therapy:   [address] [place] [address] Diet List:686517843}    Routes of Feeding: {CHP DME Other Feedings:731483343}  Liquids: {Slp liquid thickness:24838}  Daily Fluid Restriction: {CHP DME Yes amt example:019873201}  Last Modified Barium Swallow with Video (Video Swallowing Test): {Done Not Done NFGK:681940014}    Treatments at the Time of Hospital Discharge:   Respiratory Treatments: ***  Oxygen Therapy:  {Therapy; copd oxygen:29180}  Ventilator:    { CC Vent EAEB:646068083}    Rehab Therapies: {THERAPEUTIC INTERVENTION:3527561160}  Weight Bearing Status/Restrictions: [address] [place] [address] Weight Bearin}  Other Medical Equipment (for information only, NOT a DME order):  {EQUIPMENT:449609190}  Other Treatments: ***    Patient's personal belongings (please select all that are sent with patient):  {[facility] DME Belongings:071649207}    RN SIGNATURE:  {Esignature:294604544}    CASE MANAGEMENT/SOCIAL WORK SECTION    Inpatient Status Date: ***    Readmission Risk Assessment Score:  Readmission Risk              Risk of Unplanned Readmission:  0           Discharging to Facility/ Agency   Name:   Address:  Phone:  Fax:    Dialysis Facility (if applicable)   Name:  Address:  Dialysis Schedule:  Phone:  Fax:    / signature: {Esignature:870773087}    PHYSICIAN SECTION    Prognosis: {Prognosis:9569589879}    Condition at Discharge: [address] [place] [address] Patient Condition:664387491}    Rehab Potential (if transferring to Rehab): {Prognosis:2188711792}    Recommended

## 2023-12-05 ENCOUNTER — OFFICE VISIT (OUTPATIENT)
Dept: ORTHOPEDIC SURGERY | Age: 78
End: 2023-12-05

## 2023-12-05 DIAGNOSIS — S42.001A CLOSED NONDISPLACED FRACTURE OF RIGHT CLAVICLE, UNSPECIFIED PART OF CLAVICLE, INITIAL ENCOUNTER: Primary | ICD-10-CM

## 2023-12-05 NOTE — PROGRESS NOTES
Progress Note    Patient: Mel Morton MRN: [id number]  SSN: [id number]    YOB: 1945  Age: 66 y.o. Sex: male        12/5/2023      Subjective:     Patient is here today now about 5 days or so out from a fall onto his right shoulder. He says for the first few days he thought was just a sprain but he went to the emergency room yesterday found that he had a right clavicle fracture. He is here today for follow-up. He denies any other issues besides his right shoulder. No issues with his left upper extremity or bilateral lower extremities. He does say he is scheduled to see Dr. Antelmo Nguyen this week and get an epidural steroid injection for some sciatica. Objective: There were no vitals filed for this visit. Physical Exam:     Skin - no open wounds or pressure sores  Motor and sensory function intact in RIGHT UPPER extremity  Pulses palpable in RIGHT UPPER extremity     XRAY FINDINGS:  I have reviewed his x-rays which show a very minimally displaced type II distal clavicle fracture on the right side    Assessment:     Minimally displaced type II right distal clavicle fracture    Plan:     I have spoke with the patient regarding fact that this is definitely something that I think can be treated nonoperatively. He is very well aligned on both views. I think we can continue with his sling for now. He can come out of the sling for some simple elbow range of motion exercises and some pendulum exercises that I have shown him here in the office. I will see him back in about 4 weeks with true AP and lordotic views of the right clavicle on return. He will be about 4-1/2 to 5 weeks out at that time. He has asked about whether or not I think is reasonable to go ahead and proceed with the injection with Dr. Keith Johnson. It does sound that the symptoms bother him quite a bit I do not see any reason from my standpoint why he would have to hold off on his injection.   I think it would be

## 2023-12-09 NOTE — ED TRIAGE NOTES
Pt Is covid + since 11/26. Reports on Friday night he stood up and felt light headed and had a syncopal episode. Unknown if hit head. No blood thinners. Reports right shoulder pain .  Pt does taking HTN medications but has stopped taking it since the LOC
Attending with

## 2023-12-13 ENCOUNTER — TELEPHONE (OUTPATIENT)
Dept: ONCOLOGY | Age: 78
End: 2023-12-13

## 2023-12-15 DIAGNOSIS — Z23 NEED FOR VACCINATION: Primary | ICD-10-CM

## 2023-12-22 ENCOUNTER — HOSPITAL ENCOUNTER (OUTPATIENT)
Dept: LAB | Age: 78
Discharge: HOME | End: 2023-12-25
Payer: MEDICARE

## 2023-12-22 DIAGNOSIS — C61 PROSTATE CANCER (HCC): ICD-10-CM

## 2023-12-22 LAB — PSA SERPL-MCNC: <0.1 NG/ML

## 2023-12-22 PROCEDURE — 84153 ASSAY OF PSA TOTAL: CPT

## 2023-12-22 PROCEDURE — 84403 ASSAY OF TOTAL TESTOSTERONE: CPT

## 2023-12-22 PROCEDURE — 36415 COLL VENOUS BLD VENIPUNCTURE: CPT

## 2023-12-23 LAB — TESTOST SERPL-MCNC: 15 NG/DL (ref 264–916)

## 2023-12-29 ENCOUNTER — HOSPITAL ENCOUNTER (OUTPATIENT)
Dept: INFUSION THERAPY | Age: 78
Discharge: HOME | End: 2023-12-29

## 2023-12-29 ENCOUNTER — OFFICE VISIT (OUTPATIENT)
Dept: ONCOLOGY | Age: 78
End: 2023-12-29

## 2023-12-29 VITALS
HEIGHT: 70 IN | OXYGEN SATURATION: 95 % | DIASTOLIC BLOOD PRESSURE: 65 MMHG | BODY MASS INDEX: 24.75 KG/M2 | HEART RATE: 74 BPM | TEMPERATURE: 97.5 F | WEIGHT: 172.9 LBS | SYSTOLIC BLOOD PRESSURE: 103 MMHG | RESPIRATION RATE: 16 BRPM

## 2023-12-29 DIAGNOSIS — C61 PROSTATE CANCER (HCC): Primary | ICD-10-CM

## 2023-12-29 PROCEDURE — 3078F DIAST BP <80 MM HG: CPT | Performed by: UROLOGY

## 2023-12-29 NOTE — PROGRESS NOTES
[facility] Hematology & Oncology  [address] [facility], [address] [place] [address]  [phone]        Mr. Christopher Blake is a 66 y.o. male with a diagnosis of prostate cancer. S/P TRUS fusion prostate bx, PCa, kelvin score 4+4=8 on 11/10/22. Stage IIC (T1c, NO, MO, PSA 6.8, GG 4). S/p XRT 66 GY completed 3/3/23 with planned 2 yr of ADT. INTERVAL HISTORY:    Mr. Kourtney Casas returns today for follow-up evaluation of his 4+4 = 8 prostate cancer diagnosed on biopsy 11/10/2022. He is status post XRT completed 3/3/2023 and every 6 month Lupron in December 2022 and June 2023. He reports significant fatigue and is interested in discontinuation of Lupron and would like to discuss this possibility today. He also states that he has had a difficult year and was recently told that his AAA that has been stable for years has started to grow. He is pending referral to vascular surgery to discuss potential interventions for this. He continues Flomax for his urinary symptoms and feels that this is providing adequate relief. He does not require refills at this time as they are provided by the [place]. He does state that his blood pressure medication was recently discontinued due to frequent episodes of hypotension with SBP in the 80s. He otherwise denies dysuria, hematuria. From previous note (6/23/23): After completing his external beam radiation therapy on 3/3/2023. He also received his for 6-month Lupron injection on 12/19/2022. He is done quite well. He experienced some diarrhea and lower urinary tract symptoms soon after and during his radiation. Does have primarily resolved. He has some mild hot flashes at night but they have not been terribly bothersome. He still has some fatigue but that is improving. He is on Flomax 0.4 mg daily. He denies any hematuria or dysuria.        Past medical, family and social histories, as well as medications and allergies, were reviewed and updated in the

## 2024-01-04 ENCOUNTER — OFFICE VISIT (OUTPATIENT)
Dept: ORTHOPEDIC SURGERY | Age: 79
End: 2024-01-04

## 2024-01-04 DIAGNOSIS — S42.001A CLOSED NONDISPLACED FRACTURE OF RIGHT CLAVICLE, UNSPECIFIED PART OF CLAVICLE, INITIAL ENCOUNTER: Primary | ICD-10-CM

## 2024-01-04 PROCEDURE — 99024 POSTOP FOLLOW-UP VISIT: CPT | Performed by: ORTHOPAEDIC SURGERY

## 2024-01-04 NOTE — PROGRESS NOTES
Progress Note    Patient: Darwin Bailey MRN: [id number]  SSN: [id number]    YOB: 1945  Age: 78 y.o.  Sex: male        1/4/2024      Subjective:     Patient is here today in follow-up of his right distal clavicle fracture.  He says it is definitely feeling better.  He reports that he is having some pain along the lateral aspect of his upper arm that sort of radiates down toward his deltoid insertion and also little bit of pain on the backside of his shoulder near the area of his scapula.  This is fairly mild and he thinks this is improving    Objective:     There were no vitals filed for this visit.       Physical Exam:     Skin - no open wounds or pressure sores  Motor and sensory function intact in RIGHT UPPER extremity  Pulses palpable in RIGHT UPPER extremity     XRAY FINDINGS:  Apxrlxygjqc-wezzqx-hi right distal clavicle fracture, findings-true AP and lordotic views of the right clavicle shows the right distal clavicle fracture still very well aligned on both views.  There is no evidence of any further displacement and there does appear to be some early evidence of healing on both views.  I am very pleased the way these look.  Impression is healing well aligned distal clavicle fracture    Assessment:     Healing right distal clavicle fracture    Plan:     I talked him a little bit about this pain down the right side of his shoulder.  He has still some pretty good strength with isolation of the supraspinatus and he says he thinks this is getting better so what we resolved to do is just to sort of watch this for now.  I have explained to him that I would be very quick however to get an MRI just to evaluate his rotator cuff since this could be something that was injured in his fall.  As long as it continues to improve a lot I think we can just sort of weight but at his next visit if he still having a good bit of pain especially with overhead activities we may get an MRI to evaluate his

## 2024-01-08 ENCOUNTER — OFFICE VISIT (OUTPATIENT)
Dept: ORTHOPEDIC SURGERY | Age: 79
End: 2024-01-08
Payer: MEDICARE

## 2024-01-08 DIAGNOSIS — M54.16 LUMBAR RADICULOPATHY: Primary | ICD-10-CM

## 2024-01-08 DIAGNOSIS — M51.36 DDD (DEGENERATIVE DISC DISEASE), LUMBAR: ICD-10-CM

## 2024-01-08 DIAGNOSIS — M48.062 SPINAL STENOSIS OF LUMBAR REGION WITH NEUROGENIC CLAUDICATION: ICD-10-CM

## 2024-01-08 PROCEDURE — 64483 NJX AA&/STRD TFRM EPI L/S 1: CPT | Performed by: PHYSICAL MEDICINE & REHABILITATION

## 2024-01-08 RX ORDER — TRIAMCINOLONE ACETONIDE 40 MG/ML
40 INJECTION, SUSPENSION INTRA-ARTICULAR; INTRAMUSCULAR ONCE
Status: COMPLETED | OUTPATIENT
Start: 2024-01-08 | End: 2024-01-08

## 2024-01-08 RX ADMIN — TRIAMCINOLONE ACETONIDE 40 MG: 40 INJECTION, SUSPENSION INTRA-ARTICULAR; INTRAMUSCULAR at 15:27

## 2024-01-08 NOTE — PROGRESS NOTES
(degenerative disc disease), lumbar  M51.36 FL NERVE BLOCK LUMBOSACRAL 1ST     triamcinolone acetonide (KENALOG-40) injection 40 mg      3. Spinal stenosis of lumbar region with neurogenic claudication  M48.062 FL NERVE BLOCK LUMBOSACRAL 1ST     triamcinolone acetonide (KENALOG-40) injection 40 mg         MILTON BARROSO MD  01/08/24

## 2024-01-10 ENCOUNTER — HOSPITAL ENCOUNTER (OUTPATIENT)
Dept: LAB | Age: 79
Discharge: HOME | End: 2024-01-13
Payer: MEDICARE

## 2024-01-10 DIAGNOSIS — D47.2 MGUS (MONOCLONAL GAMMOPATHY OF UNKNOWN SIGNIFICANCE): ICD-10-CM

## 2024-01-10 DIAGNOSIS — D47.2 MGUS (MONOCLONAL GAMMOPATHY OF UNKNOWN SIGNIFICANCE): Primary | ICD-10-CM

## 2024-01-10 LAB
ALBUMIN SERPL-MCNC: 3.8 G/DL (ref 3.2–4.6)
ALBUMIN/GLOB SERPL: 1 (ref 0.4–1.6)
ALP SERPL-CCNC: 62 U/L (ref 50–136)
ALT SERPL-CCNC: 20 U/L (ref 12–65)
ANION GAP SERPL CALC-SCNC: 5 MMOL/L (ref 2–11)
AST SERPL-CCNC: 22 U/L (ref 15–37)
BASOPHILS # BLD: 0 K/UL (ref 0–0.2)
BASOPHILS NFR BLD: 0 % (ref 0–2)
BILIRUB SERPL-MCNC: 0.7 MG/DL (ref 0.2–1.1)
BUN SERPL-MCNC: 21 MG/DL (ref 8–23)
CALCIUM SERPL-MCNC: 9.1 MG/DL (ref 8.3–10.4)
CHLORIDE SERPL-SCNC: 106 MMOL/L (ref 103–113)
CO2 SERPL-SCNC: 25 MMOL/L (ref 21–32)
CREAT SERPL-MCNC: 1.3 MG/DL (ref 0.8–1.5)
DIFFERENTIAL METHOD BLD: ABNORMAL
EOSINOPHIL # BLD: 0 K/UL (ref 0–0.8)
EOSINOPHIL NFR BLD: 0 % (ref 0.5–7.8)
ERYTHROCYTE [DISTWIDTH] IN BLOOD BY AUTOMATED COUNT: 15.5 % (ref 11.9–14.6)
GLOBULIN SER CALC-MCNC: 3.8 G/DL (ref 2.8–4.5)
GLUCOSE SERPL-MCNC: 121 MG/DL (ref 65–100)
HCT VFR BLD AUTO: 31 % (ref 41.1–50.3)
HGB BLD-MCNC: 10.3 G/DL (ref 13.6–17.2)
HGB RETIC QN AUTO: 37 PG (ref 29–35)
IMM GRANULOCYTES # BLD AUTO: 0 K/UL (ref 0–0.5)
IMM GRANULOCYTES NFR BLD AUTO: 1 % (ref 0–5)
IMM RETICS NFR: 27.5 % (ref 2.3–13.4)
LYMPHOCYTES # BLD: 1.3 K/UL (ref 0.5–4.6)
LYMPHOCYTES NFR BLD: 29 % (ref 13–44)
MCH RBC QN AUTO: 34.7 PG (ref 26.1–32.9)
MCHC RBC AUTO-ENTMCNC: 33.2 G/DL (ref 31.4–35)
MCV RBC AUTO: 104.4 FL (ref 82–102)
MONOCYTES # BLD: 0.4 K/UL (ref 0.1–1.3)
MONOCYTES NFR BLD: 8 % (ref 4–12)
NEUTS SEG # BLD: 2.7 K/UL (ref 1.7–8.2)
NEUTS SEG NFR BLD: 62 % (ref 43–78)
NRBC # BLD: 0 K/UL (ref 0–0.2)
PLATELET # BLD AUTO: 189 K/UL (ref 150–450)
PMV BLD AUTO: 10.4 FL (ref 9.4–12.3)
POTASSIUM SERPL-SCNC: 3.7 MMOL/L (ref 3.5–5.1)
PROT SERPL-MCNC: 7.6 G/DL (ref 6.3–8.2)
RBC # BLD AUTO: 2.97 M/UL (ref 4.23–5.6)
RETICS # AUTO: 0.06 M/UL (ref 0.03–0.1)
RETICS/RBC NFR AUTO: 2 % (ref 0.3–2)
SODIUM SERPL-SCNC: 136 MMOL/L (ref 136–146)
WBC # BLD AUTO: 4.4 K/UL (ref 4.3–11.1)

## 2024-01-10 PROCEDURE — 85025 COMPLETE CBC W/AUTO DIFF WBC: CPT

## 2024-01-10 PROCEDURE — 86334 IMMUNOFIX E-PHORESIS SERUM: CPT

## 2024-01-10 PROCEDURE — 80053 COMPREHEN METABOLIC PANEL: CPT

## 2024-01-10 PROCEDURE — 85046 RETICYTE/HGB CONCENTRATE: CPT

## 2024-01-10 PROCEDURE — 82784 ASSAY IGA/IGD/IGG/IGM EACH: CPT

## 2024-01-10 PROCEDURE — 36415 COLL VENOUS BLD VENIPUNCTURE: CPT

## 2024-01-10 PROCEDURE — 83521 IG LIGHT CHAINS FREE EACH: CPT

## 2024-01-10 PROCEDURE — 84165 PROTEIN E-PHORESIS SERUM: CPT

## 2024-01-11 ENCOUNTER — OFFICE VISIT (OUTPATIENT)
Dept: ONCOLOGY | Age: 79
End: 2024-01-11
Payer: MEDICARE

## 2024-01-11 VITALS
TEMPERATURE: 98.3 F | HEART RATE: 73 BPM | DIASTOLIC BLOOD PRESSURE: 80 MMHG | OXYGEN SATURATION: 98 % | WEIGHT: 173 LBS | SYSTOLIC BLOOD PRESSURE: 134 MMHG | BODY MASS INDEX: 24.77 KG/M2 | HEIGHT: 70 IN

## 2024-01-11 DIAGNOSIS — D64.9 ANEMIA, UNSPECIFIED TYPE: ICD-10-CM

## 2024-01-11 DIAGNOSIS — D47.2 MGUS (MONOCLONAL GAMMOPATHY OF UNKNOWN SIGNIFICANCE): ICD-10-CM

## 2024-01-11 DIAGNOSIS — C61 PROSTATE CANCER (HCC): Primary | ICD-10-CM

## 2024-01-11 LAB
KAPPA LC FREE SER-MCNC: 119.5 MG/L (ref 3.3–19.4)
KAPPA LC FREE/LAMBDA FREE SER: 9.19 (ref 0.26–1.65)
LAMBDA LC FREE SERPL-MCNC: 13 MG/L (ref 5.7–26.3)

## 2024-01-11 PROCEDURE — G8427 DOCREV CUR MEDS BY ELIG CLIN: HCPCS | Performed by: INTERNAL MEDICINE

## 2024-01-11 PROCEDURE — 3079F DIAST BP 80-89 MM HG: CPT | Performed by: INTERNAL MEDICINE

## 2024-01-11 PROCEDURE — 3075F SYST BP GE 130 - 139MM HG: CPT | Performed by: INTERNAL MEDICINE

## 2024-01-11 PROCEDURE — 4004F PT TOBACCO SCREEN RCVD TLK: CPT | Performed by: INTERNAL MEDICINE

## 2024-01-11 PROCEDURE — 99214 OFFICE O/P EST MOD 30 MIN: CPT | Performed by: INTERNAL MEDICINE

## 2024-01-11 PROCEDURE — G8484 FLU IMMUNIZE NO ADMIN: HCPCS | Performed by: INTERNAL MEDICINE

## 2024-01-11 PROCEDURE — 1123F ACP DISCUSS/DSCN MKR DOCD: CPT | Performed by: INTERNAL MEDICINE

## 2024-01-11 PROCEDURE — G8420 CALC BMI NORM PARAMETERS: HCPCS | Performed by: INTERNAL MEDICINE

## 2024-01-11 ASSESSMENT — PATIENT HEALTH QUESTIONNAIRE - PHQ9
SUM OF ALL RESPONSES TO PHQ QUESTIONS 1-9: 0
1. LITTLE INTEREST OR PLEASURE IN DOING THINGS: 0
2. FEELING DOWN, DEPRESSED OR HOPELESS: 0
SUM OF ALL RESPONSES TO PHQ QUESTIONS 1-9: 0
SUM OF ALL RESPONSES TO PHQ QUESTIONS 1-9: 0
SUM OF ALL RESPONSES TO PHQ9 QUESTIONS 1 & 2: 0
SUM OF ALL RESPONSES TO PHQ QUESTIONS 1-9: 0

## 2024-01-11 NOTE — PATIENT INSTRUCTIONS
call our office at [phone] if you have any  of the following symptoms:   Fever of 100.5 or greater  Chills  Shortness of breath  Swelling or pain in one leg    After office hours an answering service is available and will contact a provider for emergencies or if you are experiencing any of the above symptoms.    Patient does express an interest in My Chart.  My Chart log in information explained on the after visit summary printout at the check-out desk.    CHING CHARLTON RN

## 2024-01-11 NOTE — PROGRESS NOTES
[facility]: Office Visit \[facility] [place] [facility]\"" Patient Follow-up Visit    Chief Complaint:    Chief Complaint   Patient presents with    Follow-up         History of Present Illness:  Mr. Bailey is a 78 y.o. male who presents today for evaluation regarding anemia.  He is a patient of Dr. Richardson and Dr. Mota for localized prostate cancer, s/p EBRT with ongoing ADT as well.  His Hgb was noted to be down to 12.0 and [facility], his M-spike was 1.1 with normal calcium, creatinine of 1.4 and Hgb of 12.  He is now referred to hematology for recommendations.  His only complaint is fatigue.      He returns today for follow-up and discussion of pending labs.  Unfortunately, his myeloma proteins are still pending from his most recent lab draw.  He continues to feel quite fatigued.  In reviewing the data from his initial visit here, He had a creatinine of 1.6 with a normal calcium.  his hemoglobin was 11.5 with normal iron studies.  His M spike was 1.2 consisting of IgG kappa and his free kappa light chains were 122 with a lambda of 13.7 and a ratio of 8.91.  He has no bony pain.  The most recent addition to his medical issues has been the growth of an abdominal aortic aneurysm and he is seeing vascular surgery next week.  As noted, we await the most recent paraprotein studies.  I do however have some concern about his borderline renal function and anemia.  Even though both of these may be the result of other issues, I cannot fully rule out the possibility that myeloma may be playing a role.  My recommendation to him was that he at least consider the possibility of a PET scan and bone marrow aspirate and biopsy.  At this point however he is focused on the aneurysm and potential need for surgery.  To that end, he recently postponed his Lupron injection and has asked us to delay any notion of further intervention about his paraprotein until after the issues related to his

## 2024-01-15 ENCOUNTER — OFFICE VISIT (OUTPATIENT)
Dept: VASCULAR SURGERY | Age: 79
End: 2024-01-15
Payer: MEDICARE

## 2024-01-15 VITALS
BODY MASS INDEX: 24.48 KG/M2 | WEIGHT: 171 LBS | SYSTOLIC BLOOD PRESSURE: 147 MMHG | HEART RATE: 68 BPM | HEIGHT: 70 IN | DIASTOLIC BLOOD PRESSURE: 84 MMHG | OXYGEN SATURATION: 99 %

## 2024-01-15 DIAGNOSIS — I71.43 INFRARENAL ABDOMINAL AORTIC ANEURYSM (AAA) WITHOUT RUPTURE (HCC): Primary | ICD-10-CM

## 2024-01-15 LAB
ALBUMIN SERPL ELPH-MCNC: 4 G/DL (ref 2.9–4.4)
ALBUMIN/GLOB SERPL: 1.3 (ref 0.7–1.7)
ALPHA1 GLOB SERPL ELPH-MCNC: 0.2 G/DL (ref 0–0.4)
ALPHA2 GLOB SERPL ELPH-MCNC: 0.6 G/DL (ref 0.4–1)
B-GLOBULIN SERPL ELPH-MCNC: 0.9 G/DL (ref 0.7–1.3)
GAMMA GLOB SERPL ELPH-MCNC: 1.5 G/DL (ref 0.4–1.8)
GLOBULIN SER-MCNC: 3.2 G/DL (ref 2.2–3.9)
IGA SERPL-MCNC: 88 MG/DL (ref 61–437)
IGG SERPL-MCNC: 1820 MG/DL (ref 603–1613)
IGM SERPL-MCNC: 61 MG/DL (ref 15–143)
INTERPRETATION SERPL IEP-IMP: ABNORMAL
M PROTEIN SERPL ELPH-MCNC: 1.2 G/DL
PROT SERPL-MCNC: 7.2 G/DL (ref 6–8.5)

## 2024-01-15 PROCEDURE — 3078F DIAST BP <80 MM HG: CPT | Performed by: NURSE PRACTITIONER

## 2024-01-15 PROCEDURE — G8484 FLU IMMUNIZE NO ADMIN: HCPCS | Performed by: NURSE PRACTITIONER

## 2024-01-15 PROCEDURE — 1123F ACP DISCUSS/DSCN MKR DOCD: CPT | Performed by: NURSE PRACTITIONER

## 2024-01-15 PROCEDURE — G8427 DOCREV CUR MEDS BY ELIG CLIN: HCPCS | Performed by: NURSE PRACTITIONER

## 2024-01-15 PROCEDURE — G8420 CALC BMI NORM PARAMETERS: HCPCS | Performed by: NURSE PRACTITIONER

## 2024-01-15 PROCEDURE — 1036F TOBACCO NON-USER: CPT | Performed by: NURSE PRACTITIONER

## 2024-01-15 PROCEDURE — 3077F SYST BP >= 140 MM HG: CPT | Performed by: NURSE PRACTITIONER

## 2024-01-15 PROCEDURE — 99203 OFFICE O/P NEW LOW 30 MIN: CPT | Performed by: NURSE PRACTITIONER

## 2024-01-15 RX ORDER — ASPIRIN 81 MG/1
81 TABLET ORAL DAILY
COMMUNITY

## 2024-01-15 NOTE — PROGRESS NOTES
establishing care for an abdominal aortic aneurysm. He has known about his aneurysm for the last 10 years. He denies any new abdominal pain or back pain. No family history of aneurysms. He is taking an ASA and atorvastatin. He denies any abdominal pain or back pain. His aneurysm measures 4.3 cm on ultrasound today. I have discussed with him the genetic components of aneurysms and he will discuss with his family. Typically we do not intervene surgically unless the aneurysm reaches 5 1/2 cm. We will continue to follow with surveillance duplex studies every 6 months. He is to present to the ER immediately if he develops any new abdominal pain or back pain in the interim.       38 minutes of time was spent on this encounter ana Lerma, APRN - CNP

## 2024-01-17 ENCOUNTER — TELEPHONE (OUTPATIENT)
Dept: INTERNAL MEDICINE CLINIC | Facility: CLINIC | Age: 79
End: 2024-01-17

## 2024-01-17 NOTE — TELEPHONE ENCOUNTER
Called pt and scheduled his AWV on 2/19/24. I had nowhere to schedule his 2nd part, what needs to be done??

## 2024-01-19 ENCOUNTER — HOSPITAL ENCOUNTER (OUTPATIENT)
Dept: PHYSICAL THERAPY | Age: 79
Setting detail: RECURRING SERIES
Discharge: HOME | End: 2024-01-22
Attending: ORTHOPAEDIC SURGERY
Payer: MEDICARE

## 2024-01-19 DIAGNOSIS — M25.511 ACUTE PAIN OF RIGHT SHOULDER: Primary | ICD-10-CM

## 2024-01-19 PROCEDURE — 97162 PT EVAL MOD COMPLEX 30 MIN: CPT

## 2024-01-19 PROCEDURE — 97110 THERAPEUTIC EXERCISES: CPT

## 2024-01-19 ASSESSMENT — PAIN DESCRIPTION - ORIENTATION: ORIENTATION: RIGHT

## 2024-01-19 ASSESSMENT — PAIN DESCRIPTION - LOCATION: LOCATION: SHOULDER

## 2024-01-19 ASSESSMENT — PAIN SCALES - GENERAL: PAINLEVEL_OUTOF10: 4

## 2024-01-19 NOTE — PROGRESS NOTES
Darwin Bailey  : 1945  Primary: Medicare Part A And B (Medicare)  Secondary: M[employer]UAL [place] MEDICARE SUPP [facility] @ [address]  [facility] [address]  Phone: [phone]  Fax: [phone] Plan Frequency: 2 times per week for 90 days  Plan of Care/Certification Expiration Date: 24        Plan of Care/Certification Expiration Date:  Plan of Care/Certification Expiration Date: 24    Frequency/Duration: Plan Frequency: 2 times per week for 90 days      Time In/Out:   Time In: 30  Time Out: 1015      PT Visit Info:    Plan Frequency: 2 times per week for 90 days  Progress Note Due Date: 24  Total # of Visits to Date: 1      Visit Count:  1    OUTPATIENT PHYSICAL THERAPY:   Treatment Note 2024       Episode  (PT: Right shoulder/clavicle fracture)               Treatment Diagnosis:    Acute pain of right shoulder  Medical/Referring Diagnosis:    Closed nondisplaced fracture of right clavicle, unspecified part of clavicle, initial encounter [S42.001A]    Referring Physician:  Robi Christian MD MD Orders:  PT Eval and Treat   Return MD Appt:  2024   Date of Onset:  Onset Date: 23  Allergies:   Sulfa antibiotics  Restrictions/Precautions:   None      Interventions Planned (Treatment may consist of any combination of the following):     See Assessment Note    Subjective Comments:   Patient reports he would like for the cramping in his shoulder to stop and he would ultimately like to return to golf.  Initial Pain Level:: Right Shoulder 4/10  Post Session Pain Level:  Right  Shoulder 4/10  Medications Last Reviewed:  2024  Updated Objective Findings:  See Evaluation Note from today  Treatment   THERAPEUTIC EXERCISE: (15 minutes):    Exercises per grid below to improve mobility and strength.  Required minimal visual, verbal, and manual cues to promote proper body alignment, promote proper body posture, and promote

## 2024-01-19 NOTE — THERAPY EVALUATION
Darwin Bailey  : 1945  Primary: Medicare Part A [place] B (Medicare)  Secondary: MUTUAL [place] MEDICARE SUPP [facility] @ [address]  [facility] [address]  Phone: [phone]  Fax: [phone] Plan Frequency: 2 times per week for 90 days  Plan of Care/Certification Expiration Date: 24        Plan of Care/Certification Expiration Date:  Plan of Care/Certification Expiration Date: 24    Frequency/Duration: Plan Frequency: 2 times per week for 90 days      Time In/Out:   Time In: 30  Time Out: 1015      PT Visit Info:    Plan Frequency: 2 times per week for 90 days  Progress Note Due Date: 24  Total # of Visits to Date: 1      Visit Count:  1                OUTPATIENT PHYSICAL THERAPY:             Initial Assessment 2024               Episode (PT: Right shoulder/clavicle fracture)         Treatment Diagnosis:     Acute pain of right shoulder  Medical/Referring Diagnosis:    Closed nondisplaced fracture of right clavicle, unspecified part of clavicle, initial encounter [S42.001A]    Referring Physician:  Robi Christian MD MD Orders:  PT Eval and Treat   Return MD Appt:  2024  Date of Onset:  Onset Date: 23  Allergies:  Sulfa antibiotics  Restrictions/Precautions:    None      Medications Last Reviewed:  2024     SUBJECTIVE   History of Injury/Illness (Reason for Referral):  Patient reports he fell in December and hurt his shoulder.  He reports that he broke his collar bone.  He reports that he was in a sling for about 6 weeks.  He reports that his shoulder is tight and will cramp now.  He reports that he would like to get it feeling normal again and without cramping.  He reports his ultimate goal is to go play golf again.  Initial Pain Level:  Right Shoulder 4/10   Post Session Pain Level: Right Shoulder 4/10  Past Medical History/Comorbidities:   Mr. Bailey  has a past medical history of

## 2024-01-22 ENCOUNTER — TELEPHONE (OUTPATIENT)
Dept: RADIATION ONCOLOGY | Age: 79
End: 2024-01-22

## 2024-01-22 NOTE — TELEPHONE ENCOUNTER
Pt is ready to have his Lupron inj but wants to know if he has to see Dr Richardson prior to that and also does pt have to have additional testing for Lymphoma

## 2024-01-23 ENCOUNTER — HOSPITAL ENCOUNTER (OUTPATIENT)
Dept: PHYSICAL THERAPY | Age: 79
Setting detail: RECURRING SERIES
Discharge: HOME | End: 2024-01-26
Attending: ORTHOPAEDIC SURGERY
Payer: MEDICARE

## 2024-01-23 DIAGNOSIS — C61 PROSTATE CANCER (HCC): Primary | ICD-10-CM

## 2024-01-23 PROCEDURE — 97110 THERAPEUTIC EXERCISES: CPT

## 2024-01-23 ASSESSMENT — PAIN SCALES - GENERAL: PAINLEVEL_OUTOF10: 2

## 2024-01-23 NOTE — PROGRESS NOTES
PERIPHERAL GCCOIG GCC   4/12/2024 11:00 AM Hector Hook MD [facility] GVL AMB   7/22/2024  9:30 AM [facility] ULTRASOUND 1 [facility] GVL AMB   7/22/2024 10:30 AM Oksana Lerma APRN - CNP BS GVL AMB

## 2024-01-23 NOTE — PROGRESS NOTES
3/12/2024  9:00 AM GCC RAD ONC LAB [facility]   3/21/2024  8:30 AM Lon Mota MD [facility]   4/5/2024 11:00 AM PERIPHERAL [facility]OIG [facility]   4/12/2024 11:00 AM Hector Hook MD [facility] GVL AMB   7/22/2024  9:30 AM [facility] ULTRASOUND 1 [facility] GVL AMB   7/22/2024 10:30 AM Oksana Lerma APRN - CNP [facility] GVL AMB

## 2024-01-25 ENCOUNTER — HOSPITAL ENCOUNTER (OUTPATIENT)
Dept: PHYSICAL THERAPY | Age: 79
Setting detail: RECURRING SERIES
Discharge: HOME | End: 2024-01-28
Attending: ORTHOPAEDIC SURGERY
Payer: MEDICARE

## 2024-01-25 PROCEDURE — 97110 THERAPEUTIC EXERCISES: CPT

## 2024-01-25 PROCEDURE — 97140 MANUAL THERAPY 1/> REGIONS: CPT

## 2024-01-25 ASSESSMENT — PAIN SCALES - GENERAL: PAINLEVEL_OUTOF10: 2

## 2024-01-25 NOTE — PROGRESS NOTES
Oksana EUGENE, PT [facility]PT [facility]   2/27/2024 10:15 AM Komal Bray, PTA [facility]   3/12/2024  9:00 AM GCC RAD ONC LAB [facility]   3/21/2024  8:30 AM Lon Mota MD [facility]   4/5/2024 11:00 AM PERIPHERAL GCCOIG [facility]   4/12/2024 11:00 AM Hector Hook MD [facility] GVL AMB   7/22/2024  9:30 AM BSV[facility] ULTRASOUND 1 [facility]VL AMB   7/22/2024 10:30 AM Oksana Lerma, APRN - CNP [facility] G[facility] AMB

## 2024-01-26 ENCOUNTER — HOSPITAL ENCOUNTER (OUTPATIENT)
Dept: INFUSION THERAPY | Age: 79
Setting detail: INFUSION SERIES
Discharge: HOME | End: 2024-01-26
Payer: MEDICARE

## 2024-01-26 ENCOUNTER — HOSPITAL ENCOUNTER (OUTPATIENT)
Dept: LAB | Age: 79
End: 2024-01-26
Payer: MEDICARE

## 2024-01-26 ENCOUNTER — OFFICE VISIT (OUTPATIENT)
Dept: ONCOLOGY | Age: 79
End: 2024-01-26

## 2024-01-26 VITALS — HEIGHT: 70 IN | BODY MASS INDEX: 24.52 KG/M2 | WEIGHT: 171.3 LBS

## 2024-01-26 DIAGNOSIS — C61 PROSTATE CANCER (HCC): ICD-10-CM

## 2024-01-26 DIAGNOSIS — C61 PROSTATE CANCER (HCC): Primary | ICD-10-CM

## 2024-01-26 LAB — PSA SERPL-MCNC: <0.1 NG/ML

## 2024-01-26 PROCEDURE — 1036F TOBACCO NON-USER: CPT | Performed by: UROLOGY

## 2024-01-26 PROCEDURE — 84153 ASSAY OF PSA TOTAL: CPT

## 2024-01-26 PROCEDURE — 36415 COLL VENOUS BLD VENIPUNCTURE: CPT

## 2024-01-26 PROCEDURE — 96402 CHEMO HORMON ANTINEOPL SQ/IM: CPT

## 2024-01-26 PROCEDURE — 84403 ASSAY OF TOTAL TESTOSTERONE: CPT

## 2024-01-26 PROCEDURE — 6360000002 HC RX W HCPCS: Performed by: PHYSICIAN ASSISTANT

## 2024-01-26 RX ORDER — SODIUM CHLORIDE 9 MG/ML
INJECTION, SOLUTION INTRAVENOUS CONTINUOUS
Status: CANCELLED | OUTPATIENT
Start: 2024-01-26

## 2024-01-26 RX ORDER — EPINEPHRINE 1 MG/ML
0.3 INJECTION, SOLUTION, CONCENTRATE INTRAVENOUS PRN
Status: CANCELLED | OUTPATIENT
Start: 2024-01-26

## 2024-01-26 RX ORDER — ONDANSETRON 2 MG/ML
8 INJECTION INTRAMUSCULAR; INTRAVENOUS
Status: CANCELLED | OUTPATIENT
Start: 2024-01-26

## 2024-01-26 RX ORDER — SODIUM CHLORIDE 9 MG/ML
INJECTION, SOLUTION INTRAVENOUS CONTINUOUS
OUTPATIENT
Start: 2024-05-24

## 2024-01-26 RX ORDER — ACETAMINOPHEN 325 MG/1
650 TABLET ORAL
Status: CANCELLED | OUTPATIENT
Start: 2024-01-26

## 2024-01-26 RX ORDER — EPINEPHRINE 1 MG/ML
0.3 INJECTION, SOLUTION, CONCENTRATE INTRAVENOUS PRN
OUTPATIENT
Start: 2024-05-24

## 2024-01-26 RX ORDER — ALBUTEROL SULFATE 90 UG/1
4 AEROSOL, METERED RESPIRATORY (INHALATION) PRN
Status: CANCELLED | OUTPATIENT
Start: 2024-01-26

## 2024-01-26 RX ORDER — ONDANSETRON 2 MG/ML
8 INJECTION INTRAMUSCULAR; INTRAVENOUS
OUTPATIENT
Start: 2024-05-24

## 2024-01-26 RX ORDER — DIPHENHYDRAMINE HYDROCHLORIDE 50 MG/ML
50 INJECTION INTRAMUSCULAR; INTRAVENOUS
OUTPATIENT
Start: 2024-05-24

## 2024-01-26 RX ORDER — ACETAMINOPHEN 325 MG/1
650 TABLET ORAL
OUTPATIENT
Start: 2024-05-24

## 2024-01-26 RX ORDER — DIPHENHYDRAMINE HYDROCHLORIDE 50 MG/ML
50 INJECTION INTRAMUSCULAR; INTRAVENOUS
Status: CANCELLED | OUTPATIENT
Start: 2024-01-26

## 2024-01-26 RX ORDER — ALBUTEROL SULFATE 90 UG/1
4 AEROSOL, METERED RESPIRATORY (INHALATION) PRN
OUTPATIENT
Start: 2024-05-24

## 2024-01-26 RX ADMIN — LEUPROLIDE ACETATE 45 MG: KIT at 10:41

## 2024-01-26 ASSESSMENT — ENCOUNTER SYMPTOMS: SHORTNESS OF BREATH: 1

## 2024-01-26 NOTE — PROGRESS NOTES
Urologic Oncology  [facility] Hematology & Oncology  [address] [place] [id number]  [phone]        Mr. Darwin Bailey is a 78 y.o. male with a diagnosis of prostate cancer. S/P TRUS fusion prostate bx, PCa, kelvin score 4+4=8 on 11/10/22. Stage IIC (T1c, NO, MO, PSA 6.8, GG 4). S/p XRT 78 GY completed 3/3/23 with planned 2 yr of ADT.      INTERVAL HISTORY:    Mr. Bailey returns today for continued treatment with every 6 month Lupron.  He was last seen 12/29/2023 and requested to defer Lupron due to significant fatigue with plans to discuss increasing size of his AAA with vascular surgery at the first of the year.  He was seen by vascular surgery and they have elected to continue with observation and he would like to move forward with Lupron.    Patient continues to have significant fatigue as well as dyspnea on exertion.  He states that his RODRIGUEZ has been present for greater than a year.  He does have a 40-pack-year smoking history, not current smoker.    He denies any significant LUTS.  He feels that he empties his bladder entirely and voids with adequate stream.  No dysuria or hematuria.        From previous note (12/29/23):  Mr. Bailey returns today for follow-up evaluation of his 4+4 = 8 prostate cancer diagnosed on biopsy 11/10/2022.  He is status post XRT completed 3/3/2023 and every 6 month Lupron in December 2022 and June 2023.  He reports significant fatigue and is interested in discontinuation of Lupron and would like to discuss this possibility today.  He also states that he has had a difficult year and was recently told that his AAA that has been stable for years has started to grow.  He is pending referral to vascular surgery to discuss potential interventions for this.  He continues Flomax for his urinary symptoms and feels that this is providing adequate relief.  He does not require refills at this time as [facility]  He does state that his blood pressure

## 2024-01-26 NOTE — PROGRESS NOTES
Arrived to the Infusion Center.  Lupron completed.   Provided education on purpose of injection    Patient instructed to report any side affects to ordering provider.  Patient tolerated without problems.   Any issues or concerns during appointment: No.  Patient aware of next infusion appointment on 08/02/2024 (date) at 1000 (time).  Discharged ambulatory.

## 2024-01-29 LAB — TESTOST SERPL-MCNC: 3 NG/DL (ref 264–916)

## 2024-01-30 ENCOUNTER — HOSPITAL ENCOUNTER (OUTPATIENT)
Dept: PHYSICAL THERAPY | Age: 79
Setting detail: RECURRING SERIES
Discharge: HOME | End: 2024-02-02
Attending: ORTHOPAEDIC SURGERY
Payer: MEDICARE

## 2024-01-30 PROCEDURE — 97110 THERAPEUTIC EXERCISES: CPT

## 2024-01-30 PROCEDURE — 97140 MANUAL THERAPY 1/> REGIONS: CPT

## 2024-01-30 ASSESSMENT — PAIN SCALES - GENERAL: PAINLEVEL_OUTOF10: 2

## 2024-01-30 NOTE — PROGRESS NOTES
Darwin Bailey  : 1945  Primary: Medicare Part A And B (Medicare)  Secondary: M[employer]UAL [place] MEDICARE SUPP [facility] @ [address]  [facility] [address]  Phone: [phone]  Fax: [phone] Plan Frequency: 2 times per week for 90 days  Plan of Care/Certification Expiration Date: 24        Plan of Care/Certification Expiration Date:  Plan of Care/Certification Expiration Date: 24    Frequency/Duration: Plan Frequency: 2 times per week for 90 days      Time In/Out:   Time In: 1430  Time Out: 1515      PT Visit Info:    Plan Frequency: 2 times per week for 90 days  Progress Note Due Date: 24  Total # of Visits to Date: 4      Visit Count:  4    OUTPATIENT PHYSICAL THERAPY:   Treatment Note 2024       Episode  (PT: Right shoulder/clavicle fracture)               Treatment Diagnosis:    Acute pain of right shoulder  Medical/Referring Diagnosis:    Closed nondisplaced fracture of right clavicle, unspecified part of clavicle, initial encounter [S42.001A]    Referring Physician:  Robi Christian MD MD Orders:  PT Eval and Treat   Return MD Appt:  2024   Date of Onset:  Onset Date: 23  Allergies:   Sulfa antibiotics  Restrictions/Precautions:   None      Interventions Planned (Treatment may consist of any combination of the following):     See Assessment Note    Subjective Comments:   \"I have been pretty busy, I have not done a lot of the exercises I confess\"  Initial Pain Level::     2/10  Post Session Pain Level:       2/10  Medications Last Reviewed:  2024  Updated Objective Findings:  None Today  Treatment   THERAPEUTIC EXERCISE: (30 minutes):    Exercises per grid below to improve mobility and strength.  Required minimal visual, verbal, and manual cues to promote proper body alignment, promote proper body posture, and promote proper body mechanics.  Progressed resistance, range, repetitions, and complexity of

## 2024-02-01 ENCOUNTER — HOSPITAL ENCOUNTER (OUTPATIENT)
Dept: PHYSICAL THERAPY | Age: 79
Setting detail: RECURRING SERIES
Discharge: HOME | End: 2024-02-04
Attending: ORTHOPAEDIC SURGERY
Payer: MEDICARE

## 2024-02-01 PROCEDURE — 97140 MANUAL THERAPY 1/> REGIONS: CPT

## 2024-02-01 PROCEDURE — 97110 THERAPEUTIC EXERCISES: CPT

## 2024-02-01 ASSESSMENT — PAIN SCALES - GENERAL: PAINLEVEL_OUTOF10: 2

## 2024-02-01 NOTE — PROGRESS NOTES
Darwin Bailey  : 1945  Primary: Medicare Part A [place] B (Medicare)  Secondary: MUTUAL [place] MEDICARE SUPP [facility] @ [address]  [facility] [address]  Phone: [phone]  Fax: [phone] Plan Frequency: 2 times per week for 90 days  Plan of Care/Certification Expiration Date: 24        Plan of Care/Certification Expiration Date:  Plan of Care/Certification Expiration Date: 24    Frequency/Duration: Plan Frequency: 2 times per week for 90 days      Time In/Out:   Time In: 1300  Time Out: 1345      PT Visit Info:    Plan Frequency: 2 times per week for 90 days  Progress Note Due Date: 24  Total # of Visits to Date: 4      Visit Count:  5    OUTPATIENT PHYSICAL THERAPY:   Treatment Note 2024       Episode  (PT: Right shoulder/clavicle fracture)               Treatment Diagnosis:    Acute pain of right shoulder  Medical/Referring Diagnosis:    Closed nondisplaced fracture of right clavicle, unspecified part of clavicle, initial encounter [S42.001A]    Referring Physician:  Robi Christian MD MD Orders:  PT Eval and Treat   Return MD Appt:  2024   Date of Onset:  Onset Date: 23  Allergies:   Sulfa antibiotics  Restrictions/Precautions:   None      Interventions Planned (Treatment may consist of any combination of the following):     See Assessment Note    Subjective Comments:   \"I was sore after last visit\"  Initial Pain Level::     2/10  Post Session Pain Level:       2/10  Medications Last Reviewed:  2024  Updated Objective Findings:  None Today  Treatment   THERAPEUTIC EXERCISE: (30 minutes):    Exercises per grid below to improve mobility and strength.  Required minimal visual, verbal, and manual cues to promote proper body alignment, promote proper body posture, and promote proper body mechanics.  Progressed resistance, range, repetitions, and complexity of movement as indicated.   Date:  2024

## 2024-02-06 ENCOUNTER — HOSPITAL ENCOUNTER (OUTPATIENT)
Dept: PHYSICAL THERAPY | Age: 79
Setting detail: RECURRING SERIES
Discharge: HOME | End: 2024-02-09
Attending: ORTHOPAEDIC SURGERY
Payer: MEDICARE

## 2024-02-06 PROCEDURE — 97140 MANUAL THERAPY 1/> REGIONS: CPT

## 2024-02-06 PROCEDURE — 97110 THERAPEUTIC EXERCISES: CPT

## 2024-02-06 ASSESSMENT — PAIN SCALES - GENERAL: PAINLEVEL_OUTOF10: 2

## 2024-02-06 ASSESSMENT — PAIN DESCRIPTION - LOCATION: LOCATION: SHOULDER

## 2024-02-06 ASSESSMENT — PAIN DESCRIPTION - ORIENTATION: ORIENTATION: RIGHT

## 2024-02-06 NOTE — PROGRESS NOTES
[facility] GVL AMB   7/22/2024 10:30 AM Oksana Lerma APRN - CNP [facility] AMB   8/2/2024  8:30 AM PERIPHERAL [facility]   8/2/2024  9:15 AM Ronny Richardson MD [facility] GVL AMB   8/2/2024 10:00 AM INFUSION GCCOPIC GCC

## 2024-02-08 ENCOUNTER — HOSPITAL ENCOUNTER (OUTPATIENT)
Dept: PHYSICAL THERAPY | Age: 79
Setting detail: RECURRING SERIES
Discharge: HOME | End: 2024-02-11
Attending: ORTHOPAEDIC SURGERY
Payer: MEDICARE

## 2024-02-08 PROCEDURE — 97110 THERAPEUTIC EXERCISES: CPT

## 2024-02-08 PROCEDURE — 97140 MANUAL THERAPY 1/> REGIONS: CPT

## 2024-02-08 NOTE — PROGRESS NOTES
Darwin Bailey  : 1945  Primary: Medicare Part A And B (Medicare)  Secondary: MUTUAL [place] MEDICARE SUPP [facility] @ [address]  [facility] [address]  Phone: [phone]  Fax: [phone] Plan Frequency: 2 times per week for 90 days  Plan of Care/Certification Expiration Date: 24        Plan of Care/Certification Expiration Date:  Plan of Care/Certification Expiration Date: 24    Frequency/Duration: Plan Frequency: 2 times per week for 90 days      Time In/Out:   Time In: 1100  Time Out: 1145      PT Visit Info:    Plan Frequency: 2 times per week for 90 days  Progress Note Due Date: 24  Total # of Visits to Date: 6      Visit Count:  7    OUTPATIENT PHYSICAL THERAPY:   Treatment Note 2024       Episode  (PT: Right shoulder/clavicle fracture)               Treatment Diagnosis:    Acute pain of right shoulder  Medical/Referring Diagnosis:    Closed nondisplaced fracture of right clavicle, unspecified part of clavicle, initial encounter [S42.001A]    Referring Physician:  Robi Christian MD MD Orders:  PT Eval and Treat   Return MD Appt:  2024   Date of Onset:  Onset Date: 23  Allergies:   Sulfa antibiotics  Restrictions/Precautions:   None      Interventions Planned (Treatment may consist of any combination of the following):     See Assessment Note    Subjective Comments:   \"My shoulder is doing better, my left one is hurting now'  Initial Pain Level::     2/10  Post Session Pain Level:       2/10  Medications Last Reviewed:  2024  Updated Objective Findings:  None Today  Treatment   THERAPEUTIC EXERCISE: (30 minutes):    Exercises per grid below to improve mobility and strength.  Required minimal visual, verbal, and manual cues to promote proper body alignment, promote proper body posture, and promote proper body mechanics.  Progressed resistance, range, repetitions, and complexity of movement as

## 2024-02-13 ENCOUNTER — HOSPITAL ENCOUNTER (OUTPATIENT)
Dept: PHYSICAL THERAPY | Age: 79
Setting detail: RECURRING SERIES
Discharge: HOME | End: 2024-02-16
Attending: ORTHOPAEDIC SURGERY
Payer: MEDICARE

## 2024-02-13 PROCEDURE — 97140 MANUAL THERAPY 1/> REGIONS: CPT

## 2024-02-13 PROCEDURE — 97110 THERAPEUTIC EXERCISES: CPT

## 2024-02-13 NOTE — PROGRESS NOTES
Darwin Bailey  : 1945  Primary: Medicare Part A And B (Medicare)  Secondary: MUTUAL [place] MEDICARE SUPP [facility] @ [address]  [facility] [address]  Phone: [phone]  Fax: [phone] Plan Frequency: 2 times per week for 90 days  Plan of Care/Certification Expiration Date: 24        Plan of Care/Certification Expiration Date:  Plan of Care/Certification Expiration Date: 24    Frequency/Duration: Plan Frequency: 2 times per week for 90 days      Time In/Out:   Time In: 0930  Time Out: 1010      PT Visit Info:    Plan Frequency: 2 times per week for 90 days  Progress Note Due Date: 24  Total # of Visits to Date: 7  Progress Note Counter: 1      Visit Count:  8    OUTPATIENT PHYSICAL THERAPY:   Treatment Note 2024       Episode  (PT: Right shoulder/clavicle fracture)               Treatment Diagnosis:    Acute pain of right shoulder  Medical/Referring Diagnosis:    Closed nondisplaced fracture of right clavicle, unspecified part of clavicle, initial encounter [S42.001A]    Referring Physician:  Robi Christian MD MD Orders:  PT Eval and Treat   Return MD Appt:  2024   Date of Onset:  Onset Date: 23  Allergies:   Sulfa antibiotics  Restrictions/Precautions:   None      Interventions Planned (Treatment may consist of any combination of the following):     See Assessment Note    Subjective Comments:   Patient reports he is doing well today.  He reports he goes to the MD tomorrow.  Initial Pain Level::     2/10  Post Session Pain Level:       2/10  Medications Last Reviewed:  2024  Updated Objective Findings:  None Today  Treatment   THERAPEUTIC EXERCISE: (30 minutes):    Exercises per grid below to improve mobility and strength.  Required minimal visual, verbal, and manual cues to promote proper body alignment, promote proper body posture, and promote proper body mechanics.  Progressed resistance, range, 
afterwards, but some of the tightness does come back.  Initial Pain Level:      2/10   Post Session Pain Level:     2/10  Past Medical History/Comorbidities:   Mr. Bailey  has a past medical history of Abdominal aneurysm without mention of rupture, Benign localized hyperplasia of prostate without urinary obstruction and other lower urinary tract symptoms (LUTS), Bladder neck obstruction, Cancer (HCC), COPD (chronic obstructive pulmonary disease) (HCC), FH: colonic polyps, GERD (gastroesophageal reflux disease), H/O testicular mass, HLD (hyperlipidemia), Hypertension, Inguinal hernia with obstruction, without mention of gangrene, recurrent unilateral or unspecified, Other and unspecified hyperlipidemia, Other ill-defined conditions(799.89), Presbyopia, Prostate cancer (HCC), Prostatitis, Refraction disorder, Renal insufficiency, and Skin lesion.  Mr. Bailey  has a past surgical history that includes other surgical history; other surgical history (Right, 02/2017); orthopedic surgery (Right); orthopedic surgery (Right); and Prostate surgery (N/A, 11/10/2022).  Social History/Living Environment:   Patient lives with their family    Prior Level of Function/Work/Activity:   Prior Level of Function: Independent   Current Level of Function: Mod Independent   Employment Status: Retired     Learning:   Does the patient/guardian have any barriers to learning?: No barriers  Will there be a co-learner?: No  What is the preferred language of the patient/guardian?: English  Is an  required?: No  How does the patient/guardian prefer to learn new concepts?: Listening; Demonstration; Pictures/Videos    Fall Risk Scale:   Howard Total Score: 40    Dominant Side:  right handed  Personal Factors:        Sex:  male        Age:  78 y.o.      OBJECTIVE   Observations:      Rounded shoulders; forward head posture  Functional Mobility:      Independent  Myotomes:     Within normal limits  Shoulder:   R UE Assessment

## 2024-02-15 ENCOUNTER — HOSPITAL ENCOUNTER (OUTPATIENT)
Dept: PHYSICAL THERAPY | Age: 79
Setting detail: RECURRING SERIES
Discharge: HOME | End: 2024-02-18
Attending: ORTHOPAEDIC SURGERY
Payer: MEDICARE

## 2024-02-15 PROCEDURE — 97140 MANUAL THERAPY 1/> REGIONS: CPT

## 2024-02-15 PROCEDURE — 97110 THERAPEUTIC EXERCISES: CPT

## 2024-02-15 ASSESSMENT — PAIN SCALES - GENERAL: PAINLEVEL_OUTOF10: 2

## 2024-02-15 NOTE — PROGRESS NOTES
Darwin Bailey  : 1945  Primary: Medicare Part A [place] B (Medicare)  Secondary: MUTUAL [place] MEDICARE SUPP [facility] @ [address]  [facility] [address]  Phone: [phone]  Fax: [phone] Plan Frequency: 2 times per week for 90 days  Plan of Care/Certification Expiration Date: 24        Plan of Care/Certification Expiration Date:  Plan of Care/Certification Expiration Date: 24    Frequency/Duration: Plan Frequency: 2 times per week for 90 days      Time In/Out:   Time In: 1015  Time Out: 1100      PT Visit Info:    Plan Frequency: 2 times per week for 90 days  Progress Note Due Date: 24  Total # of Visits to Date: 7  Progress Note Counter: 1      Visit Count:  9    OUTPATIENT PHYSICAL THERAPY:   Treatment Note 2/15/2024       Episode  (PT: Right shoulder/clavicle fracture)               Treatment Diagnosis:    Acute pain of right shoulder  Medical/Referring Diagnosis:    Closed nondisplaced fracture of right clavicle, unspecified part of clavicle, initial encounter [S42.001A]    Referring Physician:  Robi Christian MD MD Orders:  PT Eval and Treat   Return MD Appt:  2024   Date of Onset:  Onset Date: 23  Allergies:   Sulfa antibiotics  Restrictions/Precautions:   None      Interventions Planned (Treatment may consist of any combination of the following):     See Assessment Note    Subjective Comments:   \"I have my rescheduled xray because there machine was down, it is next Thursday\"  Initial Pain Level::     2/10  Post Session Pain Level:       2/10  Medications Last Reviewed:  2/15/2024  Updated Objective Findings:  None Today  Treatment   THERAPEUTIC EXERCISE: (30 minutes):    Exercises per grid below to improve mobility and strength.  Required minimal visual, verbal, and manual cues to promote proper body alignment, promote proper body posture, and promote proper body mechanics.  Progressed resistance, range,

## 2024-02-19 ENCOUNTER — OFFICE VISIT (OUTPATIENT)
Dept: INTERNAL MEDICINE CLINIC | Facility: CLINIC | Age: 79
End: 2024-02-19
Payer: MEDICARE

## 2024-02-19 ENCOUNTER — HOSPITAL ENCOUNTER (OUTPATIENT)
Dept: GENERAL RADIOLOGY | Age: 79
Discharge: HOME | End: 2024-02-22

## 2024-02-19 VITALS
DIASTOLIC BLOOD PRESSURE: 68 MMHG | SYSTOLIC BLOOD PRESSURE: 132 MMHG | WEIGHT: 177 LBS | HEIGHT: 70 IN | HEART RATE: 73 BPM | OXYGEN SATURATION: 100 % | BODY MASS INDEX: 25.34 KG/M2

## 2024-02-19 DIAGNOSIS — C61 PROSTATE CANCER (HCC): ICD-10-CM

## 2024-02-19 DIAGNOSIS — D64.9 ANEMIA, UNSPECIFIED TYPE: ICD-10-CM

## 2024-02-19 DIAGNOSIS — Z00.00 MEDICARE ANNUAL WELLNESS VISIT, SUBSEQUENT: Primary | ICD-10-CM

## 2024-02-19 DIAGNOSIS — R94.31 ABNORMAL EKG: ICD-10-CM

## 2024-02-19 DIAGNOSIS — R06.09 DYSPNEA ON EXERTION: ICD-10-CM

## 2024-02-19 DIAGNOSIS — Z85.89 HISTORY OF SQUAMOUS CELL CARCINOMA: ICD-10-CM

## 2024-02-19 DIAGNOSIS — I71.40 ABDOMINAL AORTIC ANEURYSM (AAA) 3.0 CM TO 5.5 CM IN DIAMETER IN MALE (HCC): ICD-10-CM

## 2024-02-19 DIAGNOSIS — J44.9 CHRONIC OBSTRUCTIVE PULMONARY DISEASE, UNSPECIFIED COPD TYPE (HCC): ICD-10-CM

## 2024-02-19 DIAGNOSIS — Z85.820 HISTORY OF MELANOMA: ICD-10-CM

## 2024-02-19 DIAGNOSIS — Z87.891 FORMER SMOKER: ICD-10-CM

## 2024-02-19 DIAGNOSIS — N18.32 STAGE 3B CHRONIC KIDNEY DISEASE (HCC): ICD-10-CM

## 2024-02-19 DIAGNOSIS — R94.39 ABNORMAL NUCLEAR STRESS TEST: ICD-10-CM

## 2024-02-19 DIAGNOSIS — E78.00 PURE HYPERCHOLESTEROLEMIA: ICD-10-CM

## 2024-02-19 PROCEDURE — 1036F TOBACCO NON-USER: CPT | Performed by: NURSE PRACTITIONER

## 2024-02-19 PROCEDURE — G8484 FLU IMMUNIZE NO ADMIN: HCPCS | Performed by: NURSE PRACTITIONER

## 2024-02-19 PROCEDURE — 93000 ELECTROCARDIOGRAM COMPLETE: CPT | Performed by: NURSE PRACTITIONER

## 2024-02-19 PROCEDURE — 99214 OFFICE O/P EST MOD 30 MIN: CPT | Performed by: NURSE PRACTITIONER

## 2024-02-19 PROCEDURE — G0439 PPPS, SUBSEQ VISIT: HCPCS | Performed by: NURSE PRACTITIONER

## 2024-02-19 PROCEDURE — G8417 CALC BMI ABV UP PARAM F/U: HCPCS | Performed by: NURSE PRACTITIONER

## 2024-02-19 PROCEDURE — 3074F SYST BP LT 130 MM HG: CPT | Performed by: NURSE PRACTITIONER

## 2024-02-19 PROCEDURE — G8427 DOCREV CUR MEDS BY ELIG CLIN: HCPCS | Performed by: NURSE PRACTITIONER

## 2024-02-19 PROCEDURE — 3078F DIAST BP <80 MM HG: CPT | Performed by: NURSE PRACTITIONER

## 2024-02-19 PROCEDURE — 1123F ACP DISCUSS/DSCN MKR DOCD: CPT | Performed by: NURSE PRACTITIONER

## 2024-02-19 PROCEDURE — 3023F SPIROM DOC REV: CPT | Performed by: NURSE PRACTITIONER

## 2024-02-19 RX ORDER — FLUTICASONE FUROATE AND VILANTEROL 100; 25 UG/1; UG/1
1 POWDER RESPIRATORY (INHALATION) DAILY
Qty: 1 EACH | Refills: 0
Start: 2024-02-19

## 2024-02-19 SDOH — ECONOMIC STABILITY: FOOD INSECURITY: WITHIN THE PAST 12 MONTHS, YOU WORRIED THAT YOUR FOOD WOULD RUN OUT BEFORE YOU GOT MONEY TO BUY MORE.: NEVER TRUE

## 2024-02-19 SDOH — ECONOMIC STABILITY: FOOD INSECURITY: WITHIN THE PAST 12 MONTHS, THE FOOD YOU BOUGHT JUST DIDN'T LAST AND YOU DIDN'T HAVE MONEY TO GET MORE.: NEVER TRUE

## 2024-02-19 SDOH — ECONOMIC STABILITY: INCOME INSECURITY: HOW HARD IS IT FOR YOU TO PAY FOR THE VERY BASICS LIKE FOOD, HOUSING, MEDICAL CARE, AND HEATING?: NOT HARD AT ALL

## 2024-02-19 SDOH — ECONOMIC STABILITY: HOUSING INSECURITY
IN THE LAST 12 MONTHS, WAS THERE A TIME WHEN YOU DID NOT HAVE A STEADY PLACE TO SLEEP OR SLEPT IN A SHELTER (INCLUDING NOW)?: NO

## 2024-02-19 ASSESSMENT — PATIENT HEALTH QUESTIONNAIRE - PHQ9
SUM OF ALL RESPONSES TO PHQ QUESTIONS 1-9: 0
SUM OF ALL RESPONSES TO PHQ QUESTIONS 1-9: 0
1. LITTLE INTEREST OR PLEASURE IN DOING THINGS: 0
SUM OF ALL RESPONSES TO PHQ QUESTIONS 1-9: 0
2. FEELING DOWN, DEPRESSED OR HOPELESS: 0
SUM OF ALL RESPONSES TO PHQ QUESTIONS 1-9: 0
SUM OF ALL RESPONSES TO PHQ9 QUESTIONS 1 & 2: 0

## 2024-02-19 ASSESSMENT — LIFESTYLE VARIABLES
HOW OFTEN DURING THE LAST YEAR HAVE YOU BEEN UNABLE TO REMEMBER WHAT HAPPENED THE NIGHT BEFORE BECAUSE YOU HAD BEEN DRINKING: 0
HOW OFTEN DURING THE LAST YEAR HAVE YOU NEEDED AN ALCOHOLIC DRINK FIRST THING IN THE MORNING TO GET YOURSELF GOING AFTER A NIGHT OF HEAVY DRINKING: 0
HOW OFTEN DURING THE LAST YEAR HAVE YOU FOUND THAT YOU WERE NOT ABLE TO STOP DRINKING ONCE YOU HAD STARTED: 0
HAS A RELATIVE, FRIEND, DOCTOR, OR ANOTHER HEALTH PROFESSIONAL EXPRESSED CONCERN ABOUT YOUR DRINKING OR SUGGESTED YOU CUT DOWN: 0
HOW OFTEN DURING THE LAST YEAR HAVE YOU FAILED TO DO WHAT WAS NORMALLY EXPECTED FROM YOU BECAUSE OF DRINKING: 0
HOW MANY STANDARD DRINKS CONTAINING ALCOHOL DO YOU HAVE ON A TYPICAL DAY: 1 OR 2
HOW OFTEN DO YOU HAVE A DRINK CONTAINING ALCOHOL: 4 OR MORE TIMES A WEEK
HAVE YOU OR SOMEONE ELSE BEEN INJURED AS A RESULT OF YOUR DRINKING: 0

## 2024-02-19 NOTE — PROGRESS NOTES
Medicare Annual Wellness Visit    Darwin Bailey is here for Medicare AWV    Assessment & Plan   Medicare annual wellness visit, subsequent  Dyspnea on exertion  -     EKG 12 Lead  -     XR CHEST PA LAT (2 VIEWS); Future  -     [facility] Regi Scott MD, Pulmonology, [place]  -     [facility]  Abnormal EKG  -     [facility]  Abnormal nuclear stress test  -     [facility]  Anemia, unspecified type  -     [facility] Regi Scott MD, Pulmonology, [place]  Former smoker  Chronic obstructive pulmonary disease, unspecified COPD type (HCC)  -     [facility] Regi Scott MD, Pulmonology, [place]  -     fluticasone furoate-vilanterol (BREO ELLIPTA) 100-25 MCG/ACT inhaler; Inhale 1 puff into the lungs daily, Disp-1 each, R-0NO PRINT  Abdominal aortic aneurysm (AAA) 3.0 cm to 5.5 cm in diameter in male (HCC)  Stage 3b chronic kidney disease (HCC)  Pure hypercholesterolemia  -     Lipid Panel; Future  History of melanoma  History of squamous cell carcinoma  Prostate cancer (HCC)  Discussed BMI and healthy weight and diet, weight bearing exercise, smoking avoidance, sun protection and medication compliance. Reviewed appropriate health maintenance screening. The patient was counseled regarding screening procedures and recommended schedules for regular prostate exam, PSA, self testicular exam, GI hemoccult testing, colonoscopy and recommended vaccinations. PSA per urology. Needs RSV (plans), Covid booster (fall since recent infection), Tdap.     Reviewed notes from urology, oncology Ortho, ER.  Reviewed recent labs and imaging.    Worsening anemia may be contributing to dyspnea on exertion - followed by oncology.     ECG: Sinus rhythm 76 beats per minutes few PACs.  Borderline first-degree AV block.  QRS with T wave inversion anterior leads  Refer to cardiology. Re: abnormal ECG with dyspnea on exertion and history of abnormal nuclear stress

## 2024-02-20 ENCOUNTER — HOSPITAL ENCOUNTER (OUTPATIENT)
Dept: PHYSICAL THERAPY | Age: 79
Setting detail: RECURRING SERIES
Discharge: HOME | End: 2024-02-23
Attending: ORTHOPAEDIC SURGERY
Payer: MEDICARE

## 2024-02-20 PROCEDURE — 97140 MANUAL THERAPY 1/> REGIONS: CPT

## 2024-02-20 PROCEDURE — 97110 THERAPEUTIC EXERCISES: CPT

## 2024-02-20 ASSESSMENT — PAIN SCALES - GENERAL: PAINLEVEL_OUTOF10: 3

## 2024-02-20 NOTE — RESULT ENCOUNTER NOTE
Called patient to inform him of CXR results per Kiesha as follows:    Lungs without abnormality. Hiatal hernia noted. Please notify

## 2024-02-22 ENCOUNTER — OFFICE VISIT (OUTPATIENT)
Dept: ORTHOPEDIC SURGERY | Age: 79
End: 2024-02-22

## 2024-02-22 ENCOUNTER — HOSPITAL ENCOUNTER (OUTPATIENT)
Dept: PHYSICAL THERAPY | Age: 79
Setting detail: RECURRING SERIES
Discharge: HOME | End: 2024-02-25
Attending: ORTHOPAEDIC SURGERY
Payer: MEDICARE

## 2024-02-22 DIAGNOSIS — S42.001A CLOSED NONDISPLACED FRACTURE OF RIGHT CLAVICLE, UNSPECIFIED PART OF CLAVICLE, INITIAL ENCOUNTER: Primary | ICD-10-CM

## 2024-02-22 PROCEDURE — 97140 MANUAL THERAPY 1/> REGIONS: CPT

## 2024-02-22 PROCEDURE — 99024 POSTOP FOLLOW-UP VISIT: CPT | Performed by: ORTHOPAEDIC SURGERY

## 2024-02-22 PROCEDURE — 97110 THERAPEUTIC EXERCISES: CPT

## 2024-02-22 ASSESSMENT — PAIN SCALES - GENERAL: PAINLEVEL_OUTOF10: 1

## 2024-02-22 NOTE — PROGRESS NOTES
Progress Note    Patient: Darwin Bailey MRN: [id number]  SSN: [id number]    YOB: 1945  Age: 78 y.o.  Sex: male        2/22/2024      Subjective:     Patient is here today in follow-up for right type II distal clavicle fracture that is really not that displaced.  He says that he is doing well clinically he says he has not had much pain in this since early on when he first injured it.  The big thing he is asked about whether not he can get back to playing golf    Objective:     There were no vitals filed for this visit.       Physical Exam:     Skin - no open wounds or pressure sores  Motor and sensory function intact in RIGHT UPPER extremity  Pulses palpable in RIGHT UPPER extremity     XRAY FINDINGS:  Iyggqycxqlt-gnqovh-fe right type II distal clavicle fracture, findings-true AP and lordotic views of the right clavicle shows the distal clavicle fracture is still very well aligned but there is a persistent fracture line at his injury site.  He has pretty good alignment as far as his ligamentous area of his Clavicle is concerned and this is actually distal to his coracoclavicular ligament so that she may not be a type II may be more of a type I type injury.  Impression-nonunion right distal clavicle with excellent alignment    Assessment:     Right distal clavicle fracture with delayed healing    Plan:     I have explained to the patient that since he is pretty comfortable then I think it would be reasonable just to watch this.  I do not think there is much reason to do anything else as far as surgical intervention.  However if he got to the point where this was really bother him quite a bit and painful then there is a procedure that I think would help with this significantly.  He is anxious to get back to playing golf somewhat of just talked him about his going back to playing golf and as long as he is comfortable with this to me and we would not do anything else if there is things that he like

## 2024-02-27 ENCOUNTER — HOSPITAL ENCOUNTER (OUTPATIENT)
Dept: PHYSICAL THERAPY | Age: 79
Setting detail: RECURRING SERIES
Discharge: HOME | End: 2024-03-01
Attending: ORTHOPAEDIC SURGERY
Payer: MEDICARE

## 2024-02-27 PROCEDURE — 97110 THERAPEUTIC EXERCISES: CPT

## 2024-02-27 PROCEDURE — 97140 MANUAL THERAPY 1/> REGIONS: CPT

## 2024-02-27 ASSESSMENT — PAIN SCALES - GENERAL: PAINLEVEL_OUTOF10: 2

## 2024-02-27 NOTE — PROGRESS NOTES
Darwin Bailey  : 1945  Primary: Medicare Part A [place] B (Medicare)  Secondary: MUTUAL [place] MEDICARE SUPP [facility] @ [address]  [facility] [address]  Phone: [phone]  Fax: [phone] Plan Frequency: 2 times per week for 90 days  Plan of Care/Certification Expiration Date: 24        Plan of Care/Certification Expiration Date:  Plan of Care/Certification Expiration Date: 24    Frequency/Duration: Plan Frequency: 2 times per week for 90 days      Time In/Out:   Time In: 1015  Time Out: 1055      PT Visit Info:    Plan Frequency: 2 times per week for 90 days  Progress Note Due Date: 24  Total # of Visits to Date: 9  Progress Note Counter: 4      Visit Count:  12    OUTPATIENT PHYSICAL THERAPY:   Treatment Note 2024       Episode  (PT: Right shoulder/clavicle fracture)               Treatment Diagnosis:    Acute pain of right shoulder  Medical/Referring Diagnosis:    Closed nondisplaced fracture of right clavicle, unspecified part of clavicle, initial encounter [S42.001A]    Referring Physician:  Robi Christian MD MD Orders:  PT Eval and Treat   Return MD Appt:  2024   Date of Onset:  Onset Date: 23  Allergies:   Sulfa antibiotics  Restrictions/Precautions:   None      Interventions Planned (Treatment may consist of any combination of the following):     See Assessment Note    Subjective Comments: \"I a sore more than pain\"    Initial Pain Level::     2/10  Post Session Pain Level:       2/10  Medications Last Reviewed:  2024  Updated Objective Findings:  None Today  Treatment   THERAPEUTIC EXERCISE: (30 minutes):    Exercises per grid below to improve mobility and strength.  Required minimal visual, verbal, and manual cues to promote proper body alignment, promote proper body posture, and promote proper body mechanics.  Progressed resistance, range, repetitions, and complexity of movement as indicated.

## 2024-03-06 ENCOUNTER — OFFICE VISIT (OUTPATIENT)
Dept: PULMONOLOGY | Age: 79
End: 2024-03-06
Payer: MEDICARE

## 2024-03-06 VITALS
OXYGEN SATURATION: 98 % | TEMPERATURE: 97.1 F | WEIGHT: 177 LBS | BODY MASS INDEX: 25.34 KG/M2 | HEIGHT: 70 IN | SYSTOLIC BLOOD PRESSURE: 106 MMHG | DIASTOLIC BLOOD PRESSURE: 62 MMHG | HEART RATE: 75 BPM | RESPIRATION RATE: 16 BRPM

## 2024-03-06 DIAGNOSIS — Z87.891 PERSONAL HISTORY OF TOBACCO USE, PRESENTING HAZARDS TO HEALTH: ICD-10-CM

## 2024-03-06 DIAGNOSIS — R06.02 SHORTNESS OF BREATH: Primary | ICD-10-CM

## 2024-03-06 LAB
EXPIRATORY TIME: NORMAL
FEF 25-75% %PRED-PRE: NORMAL
FEF 25-75% PRED: NORMAL
FEF 25-75-PRE: NORMAL
FEV1 %PRED-PRE: NORMAL %
FEV1 PRED: 2.93 L
FEV1/FVC %PRED-PRE: NORMAL
FEV1/FVC PRED: NORMAL
FEV1/FVC: 0.73 %
FEV1: 2.6 L
FVC %PRED-PRE: NORMAL %
FVC PRED: 3.95 L
FVC: 3.54 L
PEF %PRED-PRE: NORMAL
PEF PRED: NORMAL
PEF-PRE: NORMAL

## 2024-03-06 PROCEDURE — 3074F SYST BP LT 130 MM HG: CPT | Performed by: INTERNAL MEDICINE

## 2024-03-06 PROCEDURE — 99204 OFFICE O/P NEW MOD 45 MIN: CPT | Performed by: INTERNAL MEDICINE

## 2024-03-06 PROCEDURE — 94010 BREATHING CAPACITY TEST: CPT | Performed by: INTERNAL MEDICINE

## 2024-03-06 PROCEDURE — 1036F TOBACCO NON-USER: CPT | Performed by: INTERNAL MEDICINE

## 2024-03-06 PROCEDURE — 3078F DIAST BP <80 MM HG: CPT | Performed by: INTERNAL MEDICINE

## 2024-03-06 PROCEDURE — G8484 FLU IMMUNIZE NO ADMIN: HCPCS | Performed by: INTERNAL MEDICINE

## 2024-03-06 PROCEDURE — 1123F ACP DISCUSS/DSCN MKR DOCD: CPT | Performed by: INTERNAL MEDICINE

## 2024-03-06 PROCEDURE — G8427 DOCREV CUR MEDS BY ELIG CLIN: HCPCS | Performed by: INTERNAL MEDICINE

## 2024-03-06 PROCEDURE — G0296 VISIT TO DETERM LDCT ELIG: HCPCS | Performed by: INTERNAL MEDICINE

## 2024-03-06 PROCEDURE — G8417 CALC BMI ABV UP PARAM F/U: HCPCS | Performed by: INTERNAL MEDICINE

## 2024-03-06 RX ORDER — ALBUTEROL SULFATE 90 UG/1
2 AEROSOL, METERED RESPIRATORY (INHALATION) EVERY 6 HOURS PRN
Qty: 1 EACH | Refills: 11 | Status: SHIPPED | OUTPATIENT
Start: 2024-03-06

## 2024-03-06 ASSESSMENT — PULMONARY FUNCTION TESTS
FVC: 3.54
FVC_PREDICTED: 3.95
FEV1_PREDICTED: 2.93
FEV1/FVC: 0.73
FEV1: 2.60

## 2024-03-06 NOTE — PROGRESS NOTES
Patient Name:  Darwin Bailey                               YOB: 1945  MRN: [id number]                                                Office Visit 3/6/2024    ASSESSMENT AND PLAN:  (Medical Decision Making)    1. Shortness of breath  Plan to obtain d-dimer and LDCT for screening for lung cancer.  Spirometry and exam are normal and his oxygen levels are normal with ambulation.  If d-dimer elevated, would change CT to CTPE instead of lung cancer screening.  Will arrange cpfts in the future.    - Spirometry Without Bronchodilator  - D-Dimer, Quantitative; Future    2. Personal history of tobacco use, presenting hazards to health  Discussed with patient risks and benefits of screening, including over-diagnosis, false positive rate, and total radiation exposure.  Patient currently exhibits no signs or symptoms suggestive of lung cancer.  Discussed with patient importance of compliance with yearly annual lung cancer screenings and willingness to undergo diagnosis and treatment if screening scan is positive.     - CT LUNG SCREENING; Future  - WI VISIT TO DISCUSS LUNG CA SCREEN W LDCT    Orders Placed This Encounter   Medications    albuterol sulfate HFA (PROAIR HFA) 108 (90 Base) MCG/ACT inhaler     Sig: Inhale 2 puffs into the lungs every 6 hours as needed for Wheezing     Dispense:  1 each     Refill:  11         Procedures    WI VISIT TO DISCUSS LUNG CA SCREEN W LDCT     Follow-up and Dispositions    Return in about 4 months (around 7/6/2024) for with Mike ETIENNEFT right before appt.       Regi Mckeon MD    Total time for encounter on day of encounter was 45 minutes.  This time includes chart prep, review of tests/procedures, review of other provider's notes, documentation and counseling patient regarding disease process and medications.    ___________________________________________________________________         ______      REASON FOR VISIT:   Chief Complaint   Patient presents with

## 2024-03-06 NOTE — PATIENT INSTRUCTIONS
You can get your CT of the chest done at [facility] or at [facility] locations.  Sometimes there is a lower copay at [facility].      [facility] Rad Department Scheduling  [phone]  Locations:  [facility], [facility] or [facility]      ------------------------------------------------------------------------  [facility]  [phone]    [facility] Location  1 [facility], [address] [place] [address] [id number] [place] [address] Location  [address] [place] [id number]    You will need a copy of your order to schedule with [facility].    When your CT has been done, we recommend you call us if you haven't heard about your results within a week.

## 2024-03-07 ENCOUNTER — INITIAL CONSULT (OUTPATIENT)
Age: 79
End: 2024-03-07
Payer: MEDICARE

## 2024-03-07 VITALS
HEIGHT: 70 IN | BODY MASS INDEX: 25.57 KG/M2 | DIASTOLIC BLOOD PRESSURE: 64 MMHG | HEART RATE: 68 BPM | WEIGHT: 178.6 LBS | SYSTOLIC BLOOD PRESSURE: 124 MMHG

## 2024-03-07 DIAGNOSIS — R06.02 SHORTNESS OF BREATH: ICD-10-CM

## 2024-03-07 DIAGNOSIS — R06.09 DOE (DYSPNEA ON EXERTION): Primary | ICD-10-CM

## 2024-03-07 LAB — D DIMER PPP FEU-MCNC: 2.13 UG/ML(FEU)

## 2024-03-07 PROCEDURE — G8484 FLU IMMUNIZE NO ADMIN: HCPCS | Performed by: INTERNAL MEDICINE

## 2024-03-07 PROCEDURE — 1123F ACP DISCUSS/DSCN MKR DOCD: CPT | Performed by: INTERNAL MEDICINE

## 2024-03-07 PROCEDURE — 3078F DIAST BP <80 MM HG: CPT | Performed by: INTERNAL MEDICINE

## 2024-03-07 PROCEDURE — G8417 CALC BMI ABV UP PARAM F/U: HCPCS | Performed by: INTERNAL MEDICINE

## 2024-03-07 PROCEDURE — G8427 DOCREV CUR MEDS BY ELIG CLIN: HCPCS | Performed by: INTERNAL MEDICINE

## 2024-03-07 PROCEDURE — 1036F TOBACCO NON-USER: CPT | Performed by: INTERNAL MEDICINE

## 2024-03-07 PROCEDURE — 99204 OFFICE O/P NEW MOD 45 MIN: CPT | Performed by: INTERNAL MEDICINE

## 2024-03-07 PROCEDURE — 3074F SYST BP LT 130 MM HG: CPT | Performed by: INTERNAL MEDICINE

## 2024-03-07 ASSESSMENT — ENCOUNTER SYMPTOMS
ABDOMINAL PAIN: 0
SHORTNESS OF BREATH: 1
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
## 2024-03-08 ENCOUNTER — TELEPHONE (OUTPATIENT)
Dept: PULMONOLOGY | Age: 79
End: 2024-03-08

## 2024-03-08 DIAGNOSIS — R06.02 SHORTNESS OF BREATH: Primary | ICD-10-CM

## 2024-03-08 DIAGNOSIS — R79.89 ELEVATED D-DIMER: ICD-10-CM

## 2024-03-08 NOTE — TELEPHONE ENCOUNTER
----- Message from Regi Mckeon MD sent at 3/8/2024  3:32 PM EST -----  D-dimer is somewhat elevated, not to a dangerous level but enough to plan a CT chest with contrast and make sure there is no blood clot.  If he is in agreement with this plan, I'd like you to cancel that HRCT and change to CTPE protocol please.

## 2024-03-08 NOTE — TELEPHONE ENCOUNTER
Spoke to patient went over lab work and he agrees to CTA and voices understanding.    Spoke to Radiology and scheduled CTA for 3/11/24 at 7:30am at the [facility].

## 2024-03-11 ENCOUNTER — HOSPITAL ENCOUNTER (OUTPATIENT)
Dept: CT IMAGING | Age: 79
Discharge: HOME | End: 2024-03-14
Attending: INTERNAL MEDICINE

## 2024-03-11 DIAGNOSIS — R79.89 ELEVATED D-DIMER: ICD-10-CM

## 2024-03-11 DIAGNOSIS — R06.02 SHORTNESS OF BREATH: ICD-10-CM

## 2024-03-12 ENCOUNTER — HOSPITAL ENCOUNTER (OUTPATIENT)
Dept: RADIATION ONCOLOGY | Age: 79
Setting detail: RECURRING SERIES
Discharge: HOME | End: 2024-03-15
Payer: MEDICARE

## 2024-03-12 DIAGNOSIS — C61 PROSTATE CANCER (HCC): ICD-10-CM

## 2024-03-12 LAB — PSA SERPL-MCNC: <0.1 NG/ML

## 2024-03-12 PROCEDURE — 36415 COLL VENOUS BLD VENIPUNCTURE: CPT

## 2024-03-12 PROCEDURE — 84153 ASSAY OF PSA TOTAL: CPT

## 2024-03-13 ENCOUNTER — TELEPHONE (OUTPATIENT)
Dept: PULMONOLOGY | Age: 79
End: 2024-03-13

## 2024-03-13 NOTE — TELEPHONE ENCOUNTER
Patient is calling for the results from a stat CT scan . Said they were looking for a blood clots. He is asking if the results is back . Ask for a call

## 2024-03-18 ENCOUNTER — TELEPHONE (OUTPATIENT)
Dept: PULMONOLOGY | Age: 79
End: 2024-03-18

## 2024-03-18 NOTE — TELEPHONE ENCOUNTER
----- Message from Regi Mckeon MD sent at 3/12/2024 12:17 PM EDT -----  Please let Mr. Bailey know that he does not have a blood clot in his lung, but there is a moderate amount of emphysema, a form of COPD.  We could try Trelegy ellipta inhaler as a step up from the Breo he is prescribed, to see if it makes more of a difference.  I left a sample of the medication at the  for him

## 2024-03-21 ENCOUNTER — HOSPITAL ENCOUNTER (OUTPATIENT)
Dept: RADIATION ONCOLOGY | Age: 79
Setting detail: RECURRING SERIES
Discharge: HOME | End: 2024-03-24
Payer: MEDICARE

## 2024-03-21 VITALS
DIASTOLIC BLOOD PRESSURE: 56 MMHG | WEIGHT: 181.2 LBS | SYSTOLIC BLOOD PRESSURE: 127 MMHG | OXYGEN SATURATION: 94 % | BODY MASS INDEX: 26 KG/M2 | RESPIRATION RATE: 16 BRPM | TEMPERATURE: 97.5 F | HEART RATE: 71 BPM

## 2024-03-21 DIAGNOSIS — C61 PROSTATE CANCER (HCC): Primary | ICD-10-CM

## 2024-03-21 PROCEDURE — 99211 OFF/OP EST MAY X REQ PHY/QHP: CPT

## 2024-03-21 RX ORDER — FLUTICASONE FUROATE, UMECLIDINIUM BROMIDE AND VILANTEROL TRIFENATATE 100; 62.5; 25 UG/1; UG/1; UG/1
1 POWDER RESPIRATORY (INHALATION) DAILY
COMMUNITY
End: 2024-03-26

## 2024-03-21 ASSESSMENT — PATIENT HEALTH QUESTIONNAIRE - PHQ9
SUM OF ALL RESPONSES TO PHQ QUESTIONS 1-9: 0
SUM OF ALL RESPONSES TO PHQ QUESTIONS 1-9: 0
SUM OF ALL RESPONSES TO PHQ9 QUESTIONS 1 & 2: 0
1. LITTLE INTEREST OR PLEASURE IN DOING THINGS: NOT AT ALL
SUM OF ALL RESPONSES TO PHQ QUESTIONS 1-9: 0
2. FEELING DOWN, DEPRESSED OR HOPELESS: NOT AT ALL
SUM OF ALL RESPONSES TO PHQ QUESTIONS 1-9: 0

## 2024-03-21 NOTE — PROGRESS NOTES
Patient arrives today for 6 month follow up    Patient is unaccompanied  Patient Vitals as charted  Patient reports a pain level of  0/10  Patients only issue is a weak stream  Patients AUA=11  Patient scheduled for 6 month follow up with PSAd and Testosterone

## 2024-03-21 NOTE — PROGRESS NOTES
Patient: Darwin Bailey MRN: [id number]  SSN: [id number]    YOB: 1945  Age: 78 y.o.  Sex: male      Other Providers:  Dr. Richardson     DIAGNOSIS: Stage IIC (cT1c, cN0, cM0, PSA: 6.8, Grade Group: 4)     PREVIOUS TREATMENT:  12/19/2022: Lupron 45 mg  1/9/2023 - 3/3/2023: 78 Gy in 39 fractions   06/23/2023: Lupron 45 mg  01/26/2024: Lupron 45 mg    HISTORY OF PRESENT ILLNESS:    INTERVAL HISTORY:  Darwin Bailey is a 78 y.o. male w/ a history of high risk prostate cancer sp 78 [place]ana in 39 fractions with LT-ADT. He is doing reasonably well with minimal residual XRT effects. His PSA on 3/12/24 was less than 0.1.  He had his third Lupron shot in January of this year.  Next scheduled for August of this year. He says that he is having a fair amount of fatigue. He has some hot flashes (~3/week, not bothersome). No issues with memory, mood, or concentration.     AUA at last visit was 10: Today = 11    Pertinent ROS:   ROS: dysuria no; hematuria no.; leakage no; does endorse weak stream     GI ROS: regular no.; pain with bowel movements no.; bleeding no.    Misc ROS: NA; all other review of systems are otherwise negative.      MEDICATIONS:     Current Outpatient Medications:     albuterol sulfate HFA (PROAIR HFA) 108 (90 Base) MCG/ACT inhaler, Inhale 2 puffs into the lungs every 6 hours as needed for Wheezing, Disp: 1 each, Rfl: 11    fluticasone furoate-vilanterol (BREO ELLIPTA) 100-25 MCG/ACT inhaler, Inhale 1 puff into the lungs daily (Patient not taking: Reported on 3/6/2024), Disp: 1 each, Rfl: 0    aspirin 81 MG EC tablet, Take 1 tablet by mouth daily, Disp: , Rfl:     Cholecalciferol (VITAMIN D-3) 25 MCG (1000 UT) CAPS, Take 1,000 Units by mouth daily, Disp: , Rfl:     acetaminophen (TYLENOL) 500 MG tablet, Take 2 tablets by mouth every 4 hours as needed, Disp: , Rfl:     atorvastatin (LIPITOR) 80 MG tablet, Take 1 tablet by mouth daily, Disp: , Rfl:     famotidine (PEPCID) 20 MG tablet, Take 2

## 2024-03-26 RX ORDER — FLUTICASONE FUROATE, UMECLIDINIUM BROMIDE AND VILANTEROL TRIFENATATE 100; 62.5; 25 UG/1; UG/1; UG/1
1 POWDER RESPIRATORY (INHALATION) DAILY
Qty: 1 EACH | Refills: 11 | Status: SHIPPED | OUTPATIENT
Start: 2024-03-26

## 2024-04-05 ENCOUNTER — HOSPITAL ENCOUNTER (OUTPATIENT)
Dept: LAB | Age: 79
End: 2024-04-05
Payer: MEDICARE

## 2024-04-05 DIAGNOSIS — D47.2 MGUS (MONOCLONAL GAMMOPATHY OF UNKNOWN SIGNIFICANCE): ICD-10-CM

## 2024-04-05 DIAGNOSIS — C61 PROSTATE CANCER (HCC): ICD-10-CM

## 2024-04-05 DIAGNOSIS — D64.9 ANEMIA, UNSPECIFIED TYPE: ICD-10-CM

## 2024-04-05 LAB
ALBUMIN SERPL-MCNC: 3.9 G/DL (ref 3.2–4.6)
ALBUMIN/GLOB SERPL: 1 (ref 0.4–1.6)
ALP SERPL-CCNC: 70 U/L (ref 50–136)
ALT SERPL-CCNC: 23 U/L (ref 12–65)
ANION GAP SERPL CALC-SCNC: 5 MMOL/L (ref 2–11)
AST SERPL-CCNC: 26 U/L (ref 15–37)
BASOPHILS # BLD: 0 K/UL (ref 0–0.2)
BASOPHILS NFR BLD: 0 % (ref 0–2)
BILIRUB SERPL-MCNC: 1 MG/DL (ref 0.2–1.1)
BUN SERPL-MCNC: 17 MG/DL (ref 8–23)
CALCIUM SERPL-MCNC: 9.3 MG/DL (ref 8.3–10.4)
CHLORIDE SERPL-SCNC: 108 MMOL/L (ref 103–113)
CO2 SERPL-SCNC: 25 MMOL/L (ref 21–32)
CREAT SERPL-MCNC: 1.4 MG/DL (ref 0.8–1.5)
DIFFERENTIAL METHOD BLD: ABNORMAL
EOSINOPHIL # BLD: 0 K/UL (ref 0–0.8)
EOSINOPHIL NFR BLD: 1 % (ref 0.5–7.8)
ERYTHROCYTE [DISTWIDTH] IN BLOOD BY AUTOMATED COUNT: 16 % (ref 11.9–14.6)
FOLATE SERPL-MCNC: 15.2 NG/ML (ref 3.1–17.5)
GLOBULIN SER CALC-MCNC: 4 G/DL (ref 2.8–4.5)
GLUCOSE SERPL-MCNC: 115 MG/DL (ref 65–100)
HCT VFR BLD AUTO: 34.5 % (ref 41.1–50.3)
HGB BLD-MCNC: 11.5 G/DL (ref 13.6–17.2)
HGB RETIC QN AUTO: 37 PG (ref 29–35)
IMM GRANULOCYTES # BLD AUTO: 0 K/UL (ref 0–0.5)
IMM GRANULOCYTES NFR BLD AUTO: 1 % (ref 0–5)
IMM RETICS NFR: 21.3 % (ref 2.3–13.4)
LYMPHOCYTES # BLD: 1.4 K/UL (ref 0.5–4.6)
LYMPHOCYTES NFR BLD: 32 % (ref 13–44)
MCH RBC QN AUTO: 33.8 PG (ref 26.1–32.9)
MCHC RBC AUTO-ENTMCNC: 33.3 G/DL (ref 31.4–35)
MCV RBC AUTO: 101.5 FL (ref 82–102)
MONOCYTES # BLD: 0.4 K/UL (ref 0.1–1.3)
MONOCYTES NFR BLD: 10 % (ref 4–12)
NEUTS SEG # BLD: 2.5 K/UL (ref 1.7–8.2)
NEUTS SEG NFR BLD: 56 % (ref 43–78)
NRBC # BLD: 0 K/UL (ref 0–0.2)
PLATELET # BLD AUTO: 154 K/UL (ref 150–450)
PMV BLD AUTO: 10.2 FL (ref 9.4–12.3)
POTASSIUM SERPL-SCNC: 4.2 MMOL/L (ref 3.5–5.1)
PROT SERPL-MCNC: 7.9 G/DL (ref 6.3–8.2)
RBC # BLD AUTO: 3.4 M/UL (ref 4.23–5.6)
RETICS # AUTO: 0.05 M/UL (ref 0.03–0.1)
RETICS/RBC NFR AUTO: 1.6 % (ref 0.3–2)
SODIUM SERPL-SCNC: 138 MMOL/L (ref 136–146)
TSH, 3RD GENERATION: 2.43 UIU/ML (ref 0.36–3)
VIT B12 SERPL-MCNC: 189 PG/ML (ref 193–986)
WBC # BLD AUTO: 4.3 K/UL (ref 4.3–11.1)

## 2024-04-05 PROCEDURE — 82746 ASSAY OF FOLIC ACID SERUM: CPT

## 2024-04-05 PROCEDURE — 85025 COMPLETE CBC W/AUTO DIFF WBC: CPT

## 2024-04-05 PROCEDURE — 83521 IG LIGHT CHAINS FREE EACH: CPT

## 2024-04-05 PROCEDURE — 80053 COMPREHEN METABOLIC PANEL: CPT

## 2024-04-05 PROCEDURE — 82784 ASSAY IGA/IGD/IGG/IGM EACH: CPT

## 2024-04-05 PROCEDURE — 86334 IMMUNOFIX E-PHORESIS SERUM: CPT

## 2024-04-05 PROCEDURE — 36415 COLL VENOUS BLD VENIPUNCTURE: CPT

## 2024-04-05 PROCEDURE — 84443 ASSAY THYROID STIM HORMONE: CPT

## 2024-04-05 PROCEDURE — 84165 PROTEIN E-PHORESIS SERUM: CPT

## 2024-04-05 PROCEDURE — 82607 VITAMIN B-12: CPT

## 2024-04-05 PROCEDURE — 85046 RETICYTE/HGB CONCENTRATE: CPT

## 2024-04-08 LAB
KAPPA LC FREE SER-MCNC: 135.7 MG/L (ref 3.3–19.4)
KAPPA LC FREE/LAMBDA FREE SER: 9.76 (ref 0.26–1.65)
LAMBDA LC FREE SERPL-MCNC: 13.9 MG/L (ref 5.7–26.3)

## 2024-04-09 ENCOUNTER — TELEPHONE (OUTPATIENT)
Age: 79
End: 2024-04-09

## 2024-04-09 NOTE — TELEPHONE ENCOUNTER
----- Message from Ronny Walls III, MD sent at 4/8/2024 12:20 PM EDT -----  Please let patient know that recent stress test was normal. Patient can follow up as scheduled.

## 2024-04-10 LAB
ALBUMIN SERPL ELPH-MCNC: 3.7 G/DL (ref 2.9–4.4)
ALBUMIN/GLOB SERPL: 1.1 (ref 0.7–1.7)
ALPHA1 GLOB SERPL ELPH-MCNC: 0.2 G/DL (ref 0–0.4)
ALPHA2 GLOB SERPL ELPH-MCNC: 0.5 G/DL (ref 0.4–1)
B-GLOBULIN SERPL ELPH-MCNC: 1.1 G/DL (ref 0.7–1.3)
GAMMA GLOB SERPL ELPH-MCNC: 1.6 G/DL (ref 0.4–1.8)
GLOBULIN SER-MCNC: 3.5 G/DL (ref 2.2–3.9)
IGA SERPL-MCNC: 94 MG/DL (ref 61–437)
IGG SERPL-MCNC: 1729 MG/DL (ref 603–1613)
IGM SERPL-MCNC: 65 MG/DL (ref 15–143)
INTERPRETATION SERPL IEP-IMP: ABNORMAL
M PROTEIN SERPL ELPH-MCNC: 1 G/DL
PROT SERPL-MCNC: 7.2 G/DL (ref 6–8.5)

## 2024-04-12 ENCOUNTER — OFFICE VISIT (OUTPATIENT)
Dept: ONCOLOGY | Age: 79
End: 2024-04-12
Payer: MEDICARE

## 2024-04-12 VITALS
OXYGEN SATURATION: 99 % | WEIGHT: 179.6 LBS | DIASTOLIC BLOOD PRESSURE: 85 MMHG | RESPIRATION RATE: 16 BRPM | BODY MASS INDEX: 25.71 KG/M2 | SYSTOLIC BLOOD PRESSURE: 158 MMHG | TEMPERATURE: 98.3 F | HEART RATE: 54 BPM | HEIGHT: 70 IN

## 2024-04-12 DIAGNOSIS — E53.8 B12 DEFICIENCY: Primary | ICD-10-CM

## 2024-04-12 DIAGNOSIS — C61 PROSTATE CANCER (HCC): ICD-10-CM

## 2024-04-12 DIAGNOSIS — D47.2 MGUS (MONOCLONAL GAMMOPATHY OF UNKNOWN SIGNIFICANCE): ICD-10-CM

## 2024-04-12 DIAGNOSIS — J44.9 CHRONIC OBSTRUCTIVE PULMONARY DISEASE, UNSPECIFIED COPD TYPE (HCC): ICD-10-CM

## 2024-04-12 DIAGNOSIS — D64.9 ANEMIA, UNSPECIFIED TYPE: ICD-10-CM

## 2024-04-12 PROCEDURE — G8428 CUR MEDS NOT DOCUMENT: HCPCS | Performed by: INTERNAL MEDICINE

## 2024-04-12 PROCEDURE — 1123F ACP DISCUSS/DSCN MKR DOCD: CPT | Performed by: INTERNAL MEDICINE

## 2024-04-12 PROCEDURE — 3023F SPIROM DOC REV: CPT | Performed by: INTERNAL MEDICINE

## 2024-04-12 PROCEDURE — 1036F TOBACCO NON-USER: CPT | Performed by: INTERNAL MEDICINE

## 2024-04-12 PROCEDURE — 3077F SYST BP >= 140 MM HG: CPT | Performed by: INTERNAL MEDICINE

## 2024-04-12 PROCEDURE — G8417 CALC BMI ABV UP PARAM F/U: HCPCS | Performed by: INTERNAL MEDICINE

## 2024-04-12 PROCEDURE — 99214 OFFICE O/P EST MOD 30 MIN: CPT | Performed by: INTERNAL MEDICINE

## 2024-04-12 PROCEDURE — 3079F DIAST BP 80-89 MM HG: CPT | Performed by: INTERNAL MEDICINE

## 2024-04-12 RX ORDER — CYANOCOBALAMIN 1000 UG/ML
1000 INJECTION, SOLUTION INTRAMUSCULAR; SUBCUTANEOUS ONCE
OUTPATIENT
Start: 2024-04-15 | End: 2024-04-15

## 2024-04-12 RX ORDER — ALBUTEROL SULFATE 90 UG/1
4 AEROSOL, METERED RESPIRATORY (INHALATION) PRN
OUTPATIENT
Start: 2024-04-15

## 2024-04-12 RX ORDER — SODIUM CHLORIDE 9 MG/ML
INJECTION, SOLUTION INTRAVENOUS CONTINUOUS
OUTPATIENT
Start: 2024-04-15

## 2024-04-12 RX ORDER — DIPHENHYDRAMINE HYDROCHLORIDE 50 MG/ML
50 INJECTION INTRAMUSCULAR; INTRAVENOUS
OUTPATIENT
Start: 2024-04-15

## 2024-04-12 RX ORDER — ONDANSETRON 2 MG/ML
8 INJECTION INTRAMUSCULAR; INTRAVENOUS
OUTPATIENT
Start: 2024-04-15

## 2024-04-12 RX ORDER — ACETAMINOPHEN 325 MG/1
650 TABLET ORAL
OUTPATIENT
Start: 2024-04-15

## 2024-04-12 RX ORDER — EPINEPHRINE 1 MG/ML
0.3 INJECTION, SOLUTION, CONCENTRATE INTRAVENOUS PRN
OUTPATIENT
Start: 2024-04-15

## 2024-04-12 ASSESSMENT — PATIENT HEALTH QUESTIONNAIRE - PHQ9
SUM OF ALL RESPONSES TO PHQ QUESTIONS 1-9: 0
SUM OF ALL RESPONSES TO PHQ QUESTIONS 1-9: 0
2. FEELING DOWN, DEPRESSED OR HOPELESS: NOT AT ALL
SUM OF ALL RESPONSES TO PHQ9 QUESTIONS 1 & 2: 0
SUM OF ALL RESPONSES TO PHQ QUESTIONS 1-9: 0
1. LITTLE INTEREST OR PLEASURE IN DOING THINGS: NOT AT ALL
SUM OF ALL RESPONSES TO PHQ QUESTIONS 1-9: 0

## 2024-04-12 NOTE — PATIENT INSTRUCTIONS
Patient Information from Today's Visit    Labs and symptoms reviewed. We will start Vitamin B12 injections. Continue Lupron.     Treatment Summary has been discussed and given to patient:N/A  Follow Up: monthly for B12 injections, 3 months for office visit/Lupron    Please refer to After Visit Summary or QBuyt for upcoming appointment information. If you have any questions regarding your upcoming schedule please reach out to your care team through [employer] or call [phone].    -------------------------------------------------------------------------------------------------------------------  Please call our office at [phone] if you have any  of the following symptoms:   Fever of 100.5 or greater  Chills  Shortness of breath  Swelling or pain in one leg    After office hours an answering service is available and will contact a provider for emergencies or if you are experiencing any of the above symptoms.    Patient did express an interest in My Chart.  My Chart log in information explained on the after visit summary printout at the check-out desk.    CHING CHARLTON RN

## 2024-04-12 NOTE — PROGRESS NOTES
26 15 - 37 U/L    Alk Phosphatase 70 50 - 136 U/L    Total Protein 7.9 6.3 - 8.2 g/dL    Albumin 3.9 3.2 - 4.6 g/dL    Globulin 4.0 2.8 - 4.5 g/dL    Albumin/Globulin Ratio 1.0 0.4 - 1.6     CBC with Auto Differential    Collection Time: 04/05/24 11:09 AM   Result Value Ref Range    WBC 4.3 4.3 - 11.1 K/uL    RBC 3.40 (L) 4.23 - 5.6 M/uL    Hemoglobin 11.5 (L) 13.6 - 17.2 g/dL    Hematocrit 34.5 (L) 41.1 - 50.3 %    .5 82.0 - 102.0 FL    MCH 33.8 (H) 26.1 - 32.9 PG    MCHC 33.3 31.4 - 35.0 g/dL    RDW 16.0 (H) 11.9 - 14.6 %    Platelets 154 150 - 450 K/uL    MPV 10.2 9.4 - 12.3 FL    nRBC 0.00 0.0 - 0.2 K/uL    Neutrophils % 56 43 - 78 %    Lymphocytes % 32 13 - 44 %    Monocytes % 10 4.0 - 12.0 %    Eosinophils % 1 0.5 - 7.8 %    Basophils % 0 0.0 - 2.0 %    Immature Granulocytes % 1 0.0 - 5.0 %    Neutrophils Absolute 2.5 1.7 - 8.2 K/UL    Lymphocytes Absolute 1.4 0.5 - 4.6 K/UL    Monocytes Absolute 0.4 0.1 - 1.3 K/UL    Eosinophils Absolute 0.0 0.0 - 0.8 K/UL    Basophils Absolute 0.0 0.0 - 0.2 K/UL    Immature Granulocytes Absolute 0.0 0.0 - 0.5 K/UL    Differential Type AUTOMATED          Imaging:  FL NERVE BLOCK LUMBOSACRAL 1ST    Result Date: 9/7/2023  Radiology exam is complete. No Radiologist dictation. Please follow up with ordering provider.       ASSESSMENT:   Diagnosis Orders   1. B12 deficiency  Comprehensive Metabolic Panel    CBC with Auto Differential    Kappa/Lambda Quantitative Free Light Chains, Serum    NAOMI and PE, Serum    Reticulocytes    Vitamin B12      2. Chronic obstructive pulmonary disease, unspecified COPD type (HCC)        3. Prostate cancer (HCC)  Comprehensive Metabolic Panel    CBC with Auto Differential    Kappa/Lambda Quantitative Free Light Chains, Serum    NAOMI and PE, Serum    Reticulocytes    Vitamin B12      4. Anemia, unspecified type  Comprehensive Metabolic Panel    CBC with Auto Differential    Kappa/Lambda Quantitative Free Light Chains, Serum    NAOMI and PE, Serum

## 2024-04-15 ENCOUNTER — TELEPHONE (OUTPATIENT)
Dept: PULMONOLOGY | Age: 79
End: 2024-04-15

## 2024-04-15 NOTE — TELEPHONE ENCOUNTER
Pt states that the Trelegy is $700. He can't afford that and [facility] [place] Please send as ok to get generic.     Please send Albuterol as well.

## 2024-04-15 NOTE — PROGRESS NOTES
Darwin Bailey  : 1945  Primary: Medicare Part A And B (Medicare)  Secondary: [employer] [place] MEDICARE SUPP [facility] [place] [facility] [address]  [facility] [address]  Phone: [phone]  Fax: [phone] Plan Frequency: 2 times per week for 90 days  Plan of Care/Certification Expiration Date: 24        Plan of Care/Certification Expiration Date:  Plan of Care/Certification Expiration Date: 24    Frequency/Duration: Plan Frequency: 2 times per week for 90 days      Time In/Out:   Time In: 1015  Time Out: 1100      PT Visit Info:    Plan Frequency: 2 times per week for 90 days  Progress Note Due Date: 24  Total # of Visits to Date: 7  Progress Note Counter: 2      Visit Count:  10    OUTPATIENT PHYSICAL THERAPY:   Treatment Note 2024       Episode  (PT: Right shoulder/clavicle fracture)               Treatment Diagnosis:    Acute pain of right shoulder  Medical/Referring Diagnosis:    Closed nondisplaced fracture of right clavicle, unspecified part of clavicle, initial encounter [S42.001A]    Referring Physician:  Robi Christian MD MD Orders:  PT Eval and Treat   Return MD Appt:  2024   Date of Onset:  Onset Date: 23  Allergies:   Sulfa antibiotics  Restrictions/Precautions:   None      Interventions Planned (Treatment may consist of any combination of the following):     See Assessment Note    Subjective Comments:   \"I have my rescheduled xray because there machine was down, it is next Thursday\"  Initial Pain Level::      /10  Post Session Pain Level:        /10  Medications Last Reviewed:  2024  Updated Objective Findings:  None Today  Treatment   THERAPEUTIC EXERCISE: (30 minutes):    Exercises per grid below to improve mobility and strength.  Required minimal visual, verbal, and manual cues to promote proper body alignment, promote proper body posture, and promote proper body mechanics.  Progressed resistance, range,  24

## 2024-04-17 ENCOUNTER — HOSPITAL ENCOUNTER (OUTPATIENT)
Dept: INFUSION THERAPY | Age: 79
Setting detail: INFUSION SERIES
Discharge: HOME | End: 2024-04-17
Payer: MEDICARE

## 2024-04-17 VITALS
TEMPERATURE: 98.1 F | DIASTOLIC BLOOD PRESSURE: 78 MMHG | OXYGEN SATURATION: 98 % | SYSTOLIC BLOOD PRESSURE: 148 MMHG | RESPIRATION RATE: 18 BRPM | HEART RATE: 68 BPM

## 2024-04-17 DIAGNOSIS — E53.8 B12 DEFICIENCY: Primary | ICD-10-CM

## 2024-04-17 PROCEDURE — 6360000002 HC RX W HCPCS: Performed by: INTERNAL MEDICINE

## 2024-04-17 PROCEDURE — 96372 THER/PROPH/DIAG INJ SC/IM: CPT

## 2024-04-17 RX ORDER — SODIUM CHLORIDE 9 MG/ML
INJECTION, SOLUTION INTRAVENOUS CONTINUOUS
OUTPATIENT
Start: 2024-05-15

## 2024-04-17 RX ORDER — ALBUTEROL SULFATE 90 UG/1
4 AEROSOL, METERED RESPIRATORY (INHALATION) PRN
OUTPATIENT
Start: 2024-05-15

## 2024-04-17 RX ORDER — EPINEPHRINE 1 MG/ML
0.3 INJECTION, SOLUTION, CONCENTRATE INTRAVENOUS PRN
OUTPATIENT
Start: 2024-05-15

## 2024-04-17 RX ORDER — CYANOCOBALAMIN 1000 UG/ML
1000 INJECTION, SOLUTION INTRAMUSCULAR; SUBCUTANEOUS ONCE
OUTPATIENT
Start: 2024-05-15 | End: 2024-05-15

## 2024-04-17 RX ORDER — ACETAMINOPHEN 325 MG/1
650 TABLET ORAL
OUTPATIENT
Start: 2024-05-15

## 2024-04-17 RX ORDER — CYANOCOBALAMIN 1000 UG/ML
1000 INJECTION, SOLUTION INTRAMUSCULAR; SUBCUTANEOUS ONCE
Status: COMPLETED | OUTPATIENT
Start: 2024-04-17 | End: 2024-04-17

## 2024-04-17 RX ORDER — DIPHENHYDRAMINE HYDROCHLORIDE 50 MG/ML
50 INJECTION INTRAMUSCULAR; INTRAVENOUS
OUTPATIENT
Start: 2024-05-15

## 2024-04-17 RX ORDER — ONDANSETRON 2 MG/ML
8 INJECTION INTRAMUSCULAR; INTRAVENOUS
OUTPATIENT
Start: 2024-05-15

## 2024-04-17 RX ADMIN — CYANOCOBALAMIN 1000 MCG: 1000 INJECTION, SOLUTION INTRAMUSCULAR; SUBCUTANEOUS at 09:28

## 2024-04-17 NOTE — PROGRESS NOTES
Arrived to the Infusion Center.  B12 injection completed. Patient tolerated well.   Any issues or concerns during appointment: none.  Patient instructed to call provider with temperature of 100.4 or greater or nausea/vomiting/ diarrhea or pain not controlled by medications.  Discharged ambulatory.

## 2024-04-18 DIAGNOSIS — J44.9 CHRONIC OBSTRUCTIVE PULMONARY DISEASE, UNSPECIFIED COPD TYPE (HCC): Primary | ICD-10-CM

## 2024-04-18 RX ORDER — BUDESONIDE, GLYCOPYRROLATE, AND FORMOTEROL FUMARATE 160; 9; 4.8 UG/1; UG/1; UG/1
2 AEROSOL, METERED RESPIRATORY (INHALATION) 2 TIMES DAILY
Qty: 3 EACH | Refills: 3 | Status: SHIPPED | OUTPATIENT
Start: 2024-04-18

## 2024-04-18 RX ORDER — ALBUTEROL SULFATE 90 UG/1
2 AEROSOL, METERED RESPIRATORY (INHALATION) EVERY 6 HOURS PRN
Qty: 3 EACH | Refills: 3 | Status: SHIPPED | OUTPATIENT
Start: 2024-04-18

## 2024-04-18 NOTE — TELEPHONE ENCOUNTER
Spoke to patient and informed him to call insurance company and see what is covered on the formulary and call the office back with names of medications to send to the pharmacy and he voices understanding.

## 2024-04-18 NOTE — TELEPHONE ENCOUNTER
Patient called and left message on voice mail [facility] will cover Breztri and not Trelegy and needs rx sent to [facility].  Per Dr. Mckeon ok to switch. New rx placed and routed to Dr. Mckeon to sign.  Patient notified and voices understanding.

## 2024-04-23 ENCOUNTER — TELEPHONE (OUTPATIENT)
Dept: PULMONOLOGY | Age: 79
End: 2024-04-23

## 2024-04-23 NOTE — TELEPHONE ENCOUNTER
Received fax from the [facility], Lisanicoletrharriet isn't covered on Formulary. The alternatives are Wixela and Spiriva Respimat or Stiolto and Asmanex Twisthaler. Please send in new scripts to the [facility].

## 2024-05-15 ENCOUNTER — HOSPITAL ENCOUNTER (OUTPATIENT)
Dept: INFUSION THERAPY | Age: 79
Setting detail: INFUSION SERIES
Discharge: HOME | End: 2024-05-15
Payer: MEDICARE

## 2024-05-15 VITALS
TEMPERATURE: 97.4 F | HEART RATE: 81 BPM | RESPIRATION RATE: 16 BRPM | SYSTOLIC BLOOD PRESSURE: 116 MMHG | DIASTOLIC BLOOD PRESSURE: 70 MMHG | OXYGEN SATURATION: 95 %

## 2024-05-15 DIAGNOSIS — E53.8 B12 DEFICIENCY: Primary | ICD-10-CM

## 2024-05-15 PROCEDURE — 96372 THER/PROPH/DIAG INJ SC/IM: CPT

## 2024-05-15 PROCEDURE — 6360000002 HC RX W HCPCS: Performed by: INTERNAL MEDICINE

## 2024-05-15 RX ORDER — CYANOCOBALAMIN 1000 UG/ML
1000 INJECTION, SOLUTION INTRAMUSCULAR; SUBCUTANEOUS ONCE
OUTPATIENT
Start: 2024-06-12 | End: 2024-06-12

## 2024-05-15 RX ORDER — SODIUM CHLORIDE 9 MG/ML
INJECTION, SOLUTION INTRAVENOUS CONTINUOUS
OUTPATIENT
Start: 2024-06-12

## 2024-05-15 RX ORDER — CYANOCOBALAMIN 1000 UG/ML
1000 INJECTION, SOLUTION INTRAMUSCULAR; SUBCUTANEOUS ONCE
Status: COMPLETED | OUTPATIENT
Start: 2024-05-15 | End: 2024-05-15

## 2024-05-15 RX ORDER — ALBUTEROL SULFATE 90 UG/1
4 AEROSOL, METERED RESPIRATORY (INHALATION) PRN
OUTPATIENT
Start: 2024-06-12

## 2024-05-15 RX ORDER — DIPHENHYDRAMINE HYDROCHLORIDE 50 MG/ML
50 INJECTION INTRAMUSCULAR; INTRAVENOUS
OUTPATIENT
Start: 2024-06-12

## 2024-05-15 RX ORDER — ACETAMINOPHEN 325 MG/1
650 TABLET ORAL
OUTPATIENT
Start: 2024-06-12

## 2024-05-15 RX ORDER — ONDANSETRON 2 MG/ML
8 INJECTION INTRAMUSCULAR; INTRAVENOUS
OUTPATIENT
Start: 2024-06-12

## 2024-05-15 RX ORDER — EPINEPHRINE 1 MG/ML
0.3 INJECTION, SOLUTION, CONCENTRATE INTRAVENOUS PRN
OUTPATIENT
Start: 2024-06-12

## 2024-05-15 RX ADMIN — CYANOCOBALAMIN 1000 MCG: 1000 INJECTION, SOLUTION INTRAMUSCULAR; SUBCUTANEOUS at 09:08

## 2024-05-15 NOTE — PROGRESS NOTES
Arrived to the Infusion Center. Vitamin B12 injection completed. Patient tolerated well.   Any issues or concerns during appointment: none.  Patient aware of next infusion appointment on 6/12/2024 (date) at 0915 (time).  Patient instructed to call provider with temperature of 100.4 or greater or nausea/vomiting/ diarrhea or pain not controlled by medications  Discharged ambulatory.

## 2024-05-22 ENCOUNTER — TELEPHONE (OUTPATIENT)
Dept: PULMONOLOGY | Age: 79
End: 2024-05-22

## 2024-05-22 NOTE — TELEPHONE ENCOUNTER
Last seen: 3/6/24 new  Hx: prostate Ca s/p rad tx, AAA,   Suspected granulomatous disease    CT r/o PE, moderate emphysema. Trial Trelegy, cost has been prohibitive, last note indicated attempting PA for Breztri.    Patient reporting an episode of sob while out of the house today, b/p 116/65, desaturated to 79%, able to use albuterol inhaler & get back to 90s.    Contacted patient to review episode, he reports he was able to recover without issue, but concerned this has happened more than once; has not been able to determine best routine for inhaler, reviewed that he only has albuterol. Discussed this is just for rescue and we expected he would have the Breztri from the [facility] by now. He will check with pharmacy and start using albuterol at least BID and PRN between; wants to be seen sooner than July appt w/ Dr. Mckeon but after sometime so that he can see how the new routine is going. Willing to see NP, scheduled 6/7/24 and keeping July appt w/ Dr. Mckeon.     n/a

## 2024-05-22 NOTE — TELEPHONE ENCOUNTER
TRIAGE CALL      Complaint: SOB  Cough: no  Productive:  no  Bloody Sputum:  no  Increased SOB/Wheezing:  yes  Duration: 1 days  Fever/Chills: no  OTC Meds tried: none  Had a SOB spell when he was out today.  BP-116/65  02-dropped down to 79% and he used albuterol inhaler and it finally returned back in to the 90's

## 2024-05-27 ENCOUNTER — APPOINTMENT (OUTPATIENT)
Dept: GENERAL RADIOLOGY | Age: 79
End: 2024-05-27
Payer: MEDICARE

## 2024-05-27 ENCOUNTER — APPOINTMENT (OUTPATIENT)
Dept: CT IMAGING | Age: 79
End: 2024-05-27
Payer: MEDICARE

## 2024-05-27 ENCOUNTER — HOSPITAL ENCOUNTER (EMERGENCY)
Age: 79
Discharge: HOME | End: 2024-05-27
Attending: EMERGENCY MEDICINE
Payer: MEDICARE

## 2024-05-27 VITALS
RESPIRATION RATE: 23 BRPM | BODY MASS INDEX: 24.77 KG/M2 | TEMPERATURE: 97.7 F | HEART RATE: 73 BPM | SYSTOLIC BLOOD PRESSURE: 112 MMHG | DIASTOLIC BLOOD PRESSURE: 64 MMHG | OXYGEN SATURATION: 95 % | WEIGHT: 173 LBS | HEIGHT: 70 IN

## 2024-05-27 DIAGNOSIS — I95.1 ORTHOSTATIC HYPOTENSION: ICD-10-CM

## 2024-05-27 DIAGNOSIS — R55 NEAR SYNCOPE: Primary | ICD-10-CM

## 2024-05-27 LAB
ALBUMIN SERPL-MCNC: 3.3 G/DL (ref 3.2–4.6)
ALBUMIN/GLOB SERPL: 1 (ref 1–1.9)
ALP SERPL-CCNC: 67 U/L (ref 40–129)
ALT SERPL-CCNC: 10 U/L (ref 12–65)
ANION GAP SERPL CALC-SCNC: 14 MMOL/L (ref 9–18)
AST SERPL-CCNC: 18 U/L (ref 15–37)
BASOPHILS # BLD: 0 K/UL (ref 0–0.2)
BASOPHILS NFR BLD: 1 % (ref 0–2)
BILIRUB SERPL-MCNC: 0.4 MG/DL (ref 0–1.2)
BUN SERPL-MCNC: 20 MG/DL (ref 8–23)
CALCIUM SERPL-MCNC: 8.9 MG/DL (ref 8.8–10.2)
CHLORIDE SERPL-SCNC: 105 MMOL/L (ref 98–107)
CO2 SERPL-SCNC: 19 MMOL/L (ref 20–28)
CREAT SERPL-MCNC: 1.46 MG/DL (ref 0.8–1.3)
DIFFERENTIAL METHOD BLD: ABNORMAL
EOSINOPHIL # BLD: 0 K/UL (ref 0–0.8)
EOSINOPHIL NFR BLD: 1 % (ref 0.5–7.8)
ERYTHROCYTE [DISTWIDTH] IN BLOOD BY AUTOMATED COUNT: 16.5 % (ref 11.9–14.6)
GLOBULIN SER CALC-MCNC: 3.2 G/DL (ref 2.3–3.5)
GLUCOSE SERPL-MCNC: 94 MG/DL (ref 70–99)
HCT VFR BLD AUTO: 30.6 % (ref 41.1–50.3)
HGB BLD-MCNC: 10.3 G/DL (ref 13.6–17.2)
IMM GRANULOCYTES # BLD AUTO: 0 K/UL (ref 0–0.5)
IMM GRANULOCYTES NFR BLD AUTO: 1 % (ref 0–5)
LYMPHOCYTES # BLD: 1.1 K/UL (ref 0.5–4.6)
LYMPHOCYTES NFR BLD: 27 % (ref 13–44)
MCH RBC QN AUTO: 33.1 PG (ref 26.1–32.9)
MCHC RBC AUTO-ENTMCNC: 33.7 G/DL (ref 31.4–35)
MCV RBC AUTO: 98.4 FL (ref 82–102)
MONOCYTES # BLD: 0.5 K/UL (ref 0.1–1.3)
MONOCYTES NFR BLD: 12 % (ref 4–12)
NEUTS SEG # BLD: 2.4 K/UL (ref 1.7–8.2)
NEUTS SEG NFR BLD: 59 % (ref 43–78)
NRBC # BLD: 0 K/UL (ref 0–0.2)
PLATELET # BLD AUTO: 146 K/UL (ref 150–450)
PMV BLD AUTO: 10.1 FL (ref 9.4–12.3)
POTASSIUM SERPL-SCNC: 4 MMOL/L (ref 3.5–5.1)
PROT SERPL-MCNC: 6.5 G/DL (ref 6.3–8.2)
RBC # BLD AUTO: 3.11 M/UL (ref 4.23–5.6)
SODIUM SERPL-SCNC: 138 MMOL/L (ref 136–145)
WBC # BLD AUTO: 4 K/UL (ref 4.3–11.1)

## 2024-05-27 PROCEDURE — 93005 ELECTROCARDIOGRAM TRACING: CPT | Performed by: EMERGENCY MEDICINE

## 2024-05-27 PROCEDURE — 99285 EMERGENCY DEPT VISIT HI MDM: CPT

## 2024-05-27 PROCEDURE — 70450 CT HEAD/BRAIN W/O DYE: CPT

## 2024-05-27 PROCEDURE — 80053 COMPREHEN METABOLIC PANEL: CPT

## 2024-05-27 PROCEDURE — 94761 N-INVAS EAR/PLS OXIMETRY MLT: CPT

## 2024-05-27 PROCEDURE — 71045 X-RAY EXAM CHEST 1 VIEW: CPT

## 2024-05-27 PROCEDURE — 85025 COMPLETE CBC W/AUTO DIFF WBC: CPT

## 2024-05-27 ASSESSMENT — LIFESTYLE VARIABLES
HOW OFTEN DO YOU HAVE A DRINK CONTAINING ALCOHOL: 4 OR MORE TIMES A WEEK
HOW MANY STANDARD DRINKS CONTAINING ALCOHOL DO YOU HAVE ON A TYPICAL DAY: 1 OR 2

## 2024-05-27 NOTE — ED TRIAGE NOTES
Patient was bringing in groceries and got winded. Was on his back porch and fell and is head on the deck floor. No LOC. Patient has small scraps the forehead and top of left ear    Been orthostatic positive with EMS  111/51 sitting  70/53 standing up    Sinus with PVC    500 NS in route    Bgl 108     Patient not on blood thinners except for aspirin once or twice a week

## 2024-05-27 NOTE — ED NOTES
Orthostatic Vitals:      5/27/2024     7:10 PM   Orthostatic Vitals   Orthostatic B/P and Pulse? Yes   Blood Pressure Lying 111/65   Pulse Lying 66 PER MINUTE   Blood Pressure Sitting 112/75   Pulse Sitting 72 PER MINUTE   Blood Pressure Standing 112/64   Pulse Standing 72 PER MINUTE

## 2024-05-28 LAB
EKG ATRIAL RATE: 65 BPM
EKG DIAGNOSIS: NORMAL
EKG P AXIS: 43 DEGREES
EKG P-R INTERVAL: 212 MS
EKG Q-T INTERVAL: 422 MS
EKG QRS DURATION: 98 MS
EKG QTC CALCULATION (BAZETT): 439 MS
EKG R AXIS: -37 DEGREES
EKG T AXIS: 51 DEGREES
EKG VENTRICULAR RATE: 65 BPM

## 2024-05-28 PROCEDURE — 93010 ELECTROCARDIOGRAM REPORT: CPT | Performed by: INTERNAL MEDICINE

## 2024-06-05 ENCOUNTER — TELEPHONE (OUTPATIENT)
Dept: INTERNAL MEDICINE CLINIC | Facility: CLINIC | Age: 79
End: 2024-06-05

## 2024-06-05 NOTE — TELEPHONE ENCOUNTER
Patient called in stating he went to ED on 5/27 due to getting dizzy and blacking out. Patient states he is still experiencing some lightheadedness and dizziness, with bp dropping when he changes position. Informed pt, per Kiesha that he should come in for appt for an ED follow-up and discuss this. Patient scheduled with Vidhya Mandel on 6/7/24 at 8:30 am.

## 2024-06-06 NOTE — PROGRESS NOTES
He is a 78-year-old male seen today as a work in visit secondary to shortness of breath, reported hypoxia which improved after use of albuterol inhaler.  Recommendations following last visit with Dr. Mckeon were to change Breo to Trelegy.  Cost was prohibitive but was believed that he could obtain Breztri [facility] the [facility].  He is scheduled for CPFT's prior to his next visit with Dr. Mckeon in July.    DIAGNOSTICS:    CXR 2/19/2024-lungs are clear, no acute findings.  Spirometry 3/6/2024-normal.  Ambulatory oximetry 3/6/2024-baseline saturation 95% room air at rest, 94% room air with ambulation.  Chest CT 3/11/2024-lungs are clear, no suspicious nodules, centrilobular emphysematous changes noted.  Negative for PE.  Large hiatal hernia.  CXR 5/27/2024-ER-secondary to syncope-lungs are clear, no acute findings.

## 2024-06-07 ENCOUNTER — OFFICE VISIT (OUTPATIENT)
Dept: INTERNAL MEDICINE CLINIC | Facility: CLINIC | Age: 79
End: 2024-06-07

## 2024-06-07 ENCOUNTER — OFFICE VISIT (OUTPATIENT)
Dept: PULMONOLOGY | Age: 79
End: 2024-06-07

## 2024-06-07 VITALS
OXYGEN SATURATION: 94 % | BODY MASS INDEX: 25.62 KG/M2 | HEART RATE: 64 BPM | HEIGHT: 70 IN | SYSTOLIC BLOOD PRESSURE: 120 MMHG | WEIGHT: 179 LBS | DIASTOLIC BLOOD PRESSURE: 62 MMHG

## 2024-06-07 VITALS
RESPIRATION RATE: 18 BRPM | HEIGHT: 70 IN | OXYGEN SATURATION: 96 % | WEIGHT: 177 LBS | HEART RATE: 61 BPM | DIASTOLIC BLOOD PRESSURE: 68 MMHG | TEMPERATURE: 97.2 F | SYSTOLIC BLOOD PRESSURE: 118 MMHG | BODY MASS INDEX: 25.34 KG/M2

## 2024-06-07 DIAGNOSIS — J43.2 CENTRILOBULAR EMPHYSEMA (HCC): ICD-10-CM

## 2024-06-07 DIAGNOSIS — R55 SYNCOPE, UNSPECIFIED SYNCOPE TYPE: Primary | ICD-10-CM

## 2024-06-07 DIAGNOSIS — F17.211 CIGARETTE NICOTINE DEPENDENCE IN REMISSION: ICD-10-CM

## 2024-06-07 DIAGNOSIS — Z79.899 MEDICATION MANAGEMENT: Primary | ICD-10-CM

## 2024-06-07 PROBLEM — Z87.891 PERSONAL HISTORY OF TOBACCO USE, PRESENTING HAZARDS TO HEALTH: Status: ACTIVE | Noted: 2024-06-07

## 2024-06-07 ASSESSMENT — ENCOUNTER SYMPTOMS
COUGH: 0
WHEEZING: 0
SHORTNESS OF BREATH: 1
HEMOPTYSIS: 0
SPUTUM PRODUCTION: 0

## 2024-06-07 NOTE — PROGRESS NOTES
[facility]  [address]  [place] [address]  [phone]    Patient Name:  Darwin Bailey    YOB: 1945    Office Visit 6/7/2024      CHIEF COMPLAINT:      Chief Complaint   Patient presents with    Follow-up         ASSESSMENT:   (Medical Decision Making)                                                                                                                                          Encounter Diagnoses   Name Primary?    Medication management Yes    Centrilobular emphysema (HCC)     Cigarette nicotine dependence in remission      Issues w/ VA coverage for inhaler (trelegy); initially, the VA indicated that they would cover Breztri, but the patient has had additional correspondence that they will not.  He provides a letter that describes available substitute combinations of either Wixela and Spiriva Respimat, or Stiolto and Asmanex.    He had noted significant response to Trelegy inhaler when changed to that from Breo.  It is possible that he may not require inhaled steroid long-term.  However, will await results of complete pulmonary function test to make that determination.  He does not recall significant courses of steroids for COPD exacerbations.    Chest CT demonstrated no evidence of PE but was notable for emphysema.  This was discussed with him.                      PLAN:     I have provided additional samples of Trelegy 200, 1 inhalation every morning, rinse mouth after use.    Will substitute appropriate Rx available at [facility] pharmacy for trilogy;    Stiolto 2 inhalations every morning.    Asmanex 2 inhalations every morning, rinse mouth after use.    If there are issues with coverage for Stiolto and Asmanex, communication from VA indicates that they will also cover Wixela and Spiriva.    Albuterol 2 puffs 4 times daily if needed for shortness of breath or wheezing.    Demonstration of inhalers provided.    He is commended in remaining

## 2024-06-07 NOTE — PATIENT INSTRUCTIONS
I have provided additional samples of Trelegy 200, 1 inhalation every morning, rinse mouth after use.    Will substitute appropriate Rx available at [facility] pharmacy for trilogy;    Stiolto 2 inhalations every morning.    Asmanex 2 inhalations every morning, rinse mouth after use.    If there are issues with coverage for Stiolto and Asmanex, communication from VA indicates that they will also cover Wixela and Spiriva.    Albuterol 2 puffs 4 times daily if needed for shortness of breath or wheezing.    Demonstration of inhalers provided.    He is commended in remaining tobacco free.    Complete pulmonary function test and follow-up appointment with Dr. Mckeon as scheduled.  Advised to contact us sooner if there are ongoing issues with inhalers.
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
## 2024-06-12 ENCOUNTER — HOSPITAL ENCOUNTER (OUTPATIENT)
Dept: INFUSION THERAPY | Age: 79
Setting detail: INFUSION SERIES
Discharge: HOME | End: 2024-06-12
Payer: MEDICARE

## 2024-06-12 VITALS
DIASTOLIC BLOOD PRESSURE: 81 MMHG | HEART RATE: 66 BPM | TEMPERATURE: 97.4 F | SYSTOLIC BLOOD PRESSURE: 131 MMHG | OXYGEN SATURATION: 98 % | RESPIRATION RATE: 16 BRPM

## 2024-06-12 DIAGNOSIS — E53.8 B12 DEFICIENCY: Primary | ICD-10-CM

## 2024-06-12 PROCEDURE — 6360000002 HC RX W HCPCS: Performed by: INTERNAL MEDICINE

## 2024-06-12 PROCEDURE — 96372 THER/PROPH/DIAG INJ SC/IM: CPT

## 2024-06-12 RX ORDER — SODIUM CHLORIDE 9 MG/ML
INJECTION, SOLUTION INTRAVENOUS CONTINUOUS
OUTPATIENT
Start: 2024-07-10

## 2024-06-12 RX ORDER — CYANOCOBALAMIN 1000 UG/ML
1000 INJECTION, SOLUTION INTRAMUSCULAR; SUBCUTANEOUS ONCE
OUTPATIENT
Start: 2024-07-10 | End: 2024-07-10

## 2024-06-12 RX ORDER — EPINEPHRINE 1 MG/ML
0.3 INJECTION, SOLUTION, CONCENTRATE INTRAVENOUS PRN
OUTPATIENT
Start: 2024-07-10

## 2024-06-12 RX ORDER — ALBUTEROL SULFATE 90 UG/1
4 AEROSOL, METERED RESPIRATORY (INHALATION) PRN
OUTPATIENT
Start: 2024-07-10

## 2024-06-12 RX ORDER — ACETAMINOPHEN 325 MG/1
650 TABLET ORAL
OUTPATIENT
Start: 2024-07-10

## 2024-06-12 RX ORDER — ONDANSETRON 2 MG/ML
8 INJECTION INTRAMUSCULAR; INTRAVENOUS
OUTPATIENT
Start: 2024-07-10

## 2024-06-12 RX ORDER — DIPHENHYDRAMINE HYDROCHLORIDE 50 MG/ML
50 INJECTION INTRAMUSCULAR; INTRAVENOUS
OUTPATIENT
Start: 2024-07-10

## 2024-06-12 RX ORDER — CYANOCOBALAMIN 1000 UG/ML
1000 INJECTION, SOLUTION INTRAMUSCULAR; SUBCUTANEOUS ONCE
Status: COMPLETED | OUTPATIENT
Start: 2024-06-12 | End: 2024-06-12

## 2024-06-12 RX ADMIN — CYANOCOBALAMIN 1000 MCG: 1000 INJECTION, SOLUTION INTRAMUSCULAR; SUBCUTANEOUS at 09:20

## 2024-06-12 NOTE — PROGRESS NOTES
Patient arrived to Infusion Center for B12. Assessment completed.  No needs voiced at this time. Patient tolerated injection well and is aware of next appointment on 7/12/24. Patient discharged ambulatory.

## 2024-06-17 ENCOUNTER — TELEPHONE (OUTPATIENT)
Dept: ORTHOPEDIC SURGERY | Age: 79
End: 2024-06-17

## 2024-06-25 ENCOUNTER — APPOINTMENT (OUTPATIENT)
Dept: GENERAL RADIOLOGY | Age: 79
End: 2024-06-25
Payer: MEDICARE

## 2024-06-25 ENCOUNTER — HOSPITAL ENCOUNTER (EMERGENCY)
Age: 79
Discharge: HOME | End: 2024-06-25
Payer: MEDICARE

## 2024-06-25 ENCOUNTER — APPOINTMENT (OUTPATIENT)
Dept: CT IMAGING | Age: 79
End: 2024-06-25
Payer: MEDICARE

## 2024-06-25 VITALS
HEART RATE: 71 BPM | BODY MASS INDEX: 24.34 KG/M2 | SYSTOLIC BLOOD PRESSURE: 145 MMHG | HEIGHT: 70 IN | TEMPERATURE: 98.1 F | RESPIRATION RATE: 19 BRPM | OXYGEN SATURATION: 96 % | WEIGHT: 170 LBS | DIASTOLIC BLOOD PRESSURE: 89 MMHG

## 2024-06-25 DIAGNOSIS — T14.8XXA ABRASION: ICD-10-CM

## 2024-06-25 DIAGNOSIS — R55 NEAR SYNCOPE: Primary | ICD-10-CM

## 2024-06-25 LAB
ALBUMIN SERPL-MCNC: 3.4 G/DL (ref 3.2–4.6)
ALBUMIN/GLOB SERPL: 1 (ref 1–1.9)
ALP SERPL-CCNC: 71 U/L (ref 40–129)
ALT SERPL-CCNC: 17 U/L (ref 12–65)
ANION GAP SERPL CALC-SCNC: 17 MMOL/L (ref 9–18)
AST SERPL-CCNC: 20 U/L (ref 15–37)
BASOPHILS # BLD: 0 K/UL (ref 0–0.2)
BASOPHILS NFR BLD: 0 % (ref 0–2)
BILIRUB SERPL-MCNC: 0.3 MG/DL (ref 0–1.2)
BUN SERPL-MCNC: 27 MG/DL (ref 8–23)
CALCIUM SERPL-MCNC: 9 MG/DL (ref 8.8–10.2)
CHLORIDE SERPL-SCNC: 101 MMOL/L (ref 98–107)
CO2 SERPL-SCNC: 18 MMOL/L (ref 20–28)
CREAT SERPL-MCNC: 1.37 MG/DL (ref 0.8–1.3)
DIFFERENTIAL METHOD BLD: ABNORMAL
EOSINOPHIL # BLD: 0 K/UL (ref 0–0.8)
EOSINOPHIL NFR BLD: 1 % (ref 0.5–7.8)
ERYTHROCYTE [DISTWIDTH] IN BLOOD BY AUTOMATED COUNT: 16.4 % (ref 11.9–14.6)
GLOBULIN SER CALC-MCNC: 3.4 G/DL (ref 2.3–3.5)
GLUCOSE SERPL-MCNC: 128 MG/DL (ref 70–99)
HCT VFR BLD AUTO: 32.8 % (ref 41.1–50.3)
HGB BLD-MCNC: 10.9 G/DL (ref 13.6–17.2)
IMM GRANULOCYTES # BLD AUTO: 0 K/UL (ref 0–0.5)
IMM GRANULOCYTES NFR BLD AUTO: 0 % (ref 0–5)
LYMPHOCYTES # BLD: 1.1 K/UL (ref 0.5–4.6)
LYMPHOCYTES NFR BLD: 26 % (ref 13–44)
MAGNESIUM SERPL-MCNC: 2.3 MG/DL (ref 1.8–2.4)
MCH RBC QN AUTO: 32.5 PG (ref 26.1–32.9)
MCHC RBC AUTO-ENTMCNC: 33.2 G/DL (ref 31.4–35)
MCV RBC AUTO: 97.9 FL (ref 82–102)
MONOCYTES # BLD: 0.4 K/UL (ref 0.1–1.3)
MONOCYTES NFR BLD: 10 % (ref 4–12)
NEUTS SEG # BLD: 2.6 K/UL (ref 1.7–8.2)
NEUTS SEG NFR BLD: 63 % (ref 43–78)
NRBC # BLD: 0 K/UL (ref 0–0.2)
NT PRO BNP: 113 PG/ML (ref 0–450)
PLATELET # BLD AUTO: 128 K/UL (ref 150–450)
PMV BLD AUTO: 10.3 FL (ref 9.4–12.3)
POTASSIUM SERPL-SCNC: 3.9 MMOL/L (ref 3.5–5.1)
PROT SERPL-MCNC: 6.9 G/DL (ref 6.3–8.2)
RBC # BLD AUTO: 3.35 M/UL (ref 4.23–5.6)
SODIUM SERPL-SCNC: 136 MMOL/L (ref 136–145)
TROPONIN T SERPL HS-MCNC: 15 NG/L (ref 0–22)
WBC # BLD AUTO: 4.1 K/UL (ref 4.3–11.1)

## 2024-06-25 PROCEDURE — 71045 X-RAY EXAM CHEST 1 VIEW: CPT

## 2024-06-25 PROCEDURE — 83735 ASSAY OF MAGNESIUM: CPT

## 2024-06-25 PROCEDURE — 81003 URINALYSIS AUTO W/O SCOPE: CPT

## 2024-06-25 PROCEDURE — 2580000003 HC RX 258: Performed by: PHYSICIAN ASSISTANT

## 2024-06-25 PROCEDURE — 99285 EMERGENCY DEPT VISIT HI MDM: CPT

## 2024-06-25 PROCEDURE — 72125 CT NECK SPINE W/O DYE: CPT

## 2024-06-25 PROCEDURE — 85025 COMPLETE CBC W/AUTO DIFF WBC: CPT

## 2024-06-25 PROCEDURE — 83880 ASSAY OF NATRIURETIC PEPTIDE: CPT

## 2024-06-25 PROCEDURE — 80053 COMPREHEN METABOLIC PANEL: CPT

## 2024-06-25 PROCEDURE — 70450 CT HEAD/BRAIN W/O DYE: CPT

## 2024-06-25 PROCEDURE — 84484 ASSAY OF TROPONIN QUANT: CPT

## 2024-06-25 PROCEDURE — 96360 HYDRATION IV INFUSION INIT: CPT

## 2024-06-25 RX ORDER — 0.9 % SODIUM CHLORIDE 0.9 %
500 INTRAVENOUS SOLUTION INTRAVENOUS
Status: COMPLETED | OUTPATIENT
Start: 2024-06-25 | End: 2024-06-25

## 2024-06-25 RX ADMIN — SODIUM CHLORIDE 500 ML: 9 INJECTION, SOLUTION INTRAVENOUS at 21:22

## 2024-06-25 NOTE — ED TRIAGE NOTES
Patient reports that he got up quickly from the couch to help with getting groceries when he had a syncopal episode. Skin tear noted to left elbow. Abrasion noted to left cheek next to left eye.

## 2024-06-25 NOTE — ED PROVIDER NOTES
Emergency Department Provider Note       PCP: Kiesha Bedolla, KATHRYN - DEEJAY   Age: 78 y.o.   Sex: male     DISPOSITION Discharge - Pending Orders Complete 06/25/2024 09:46:47 PM       ICD-10-CM    1. Near syncope  R55       2. Abrasion  T14.8XXA           Medical Decision Making     Normal 78-year-old male with near syncopal episode like after getting off of the couch too quickly.  Head and neck CT are unremarkable.  CBC CMP shows no change from previous magnesium was normal  troponin normal at 15 single view chest show no effusions or infiltrates orthostatics were done was unremarkable.  Patient was given another 500 cc of normal saline in the ER.  Urinalysis also done was negative.  I feel patient has some positional hypotension is causing him to have these falls.  He was advised how to get up slowly give his body time to equalize for walking forward.  He is to keep all abrasions clean see his primary care physician for recheck return for any worsening symptoms do not feel patient needs admission at this time     1 or more acute illnesses that pose a threat to life or bodily function.   Shared medical decision making was utilized in creating the patients health plan today.    I independently ordered and reviewed each unique test.         My Independent EKG Interpretation: sinus rhythm, no evidence of arrhythmia      ST Segments:Normal ST segments - NO STEMI   Rate: 63            History     78-year-old male here via EMS status post fall.  Patient states he was sitting when his wife came home from the grocery store he got up to go at helper and felt like he was going to pass out he said he felt himself going down and then \"hit the floor.  His wife states she heard him fall when she got to him he was awake he had some obvious injury to the head and left arm she did not allow him to try to get up and called EMS.  When EMS got there apparently he was hypotensive but unknown what that number was that

## 2024-06-26 ENCOUNTER — OFFICE VISIT (OUTPATIENT)
Dept: INTERNAL MEDICINE CLINIC | Facility: CLINIC | Age: 79
End: 2024-06-26
Payer: MEDICARE

## 2024-06-26 DIAGNOSIS — I71.40 ABDOMINAL AORTIC ANEURYSM (AAA) 3.0 CM TO 5.5 CM IN DIAMETER IN MALE (HCC): Primary | ICD-10-CM

## 2024-06-26 DIAGNOSIS — I95.1 ORTHOSTATIC HYPOTENSION: ICD-10-CM

## 2024-06-26 DIAGNOSIS — R29.6 RECURRENT FALLS: ICD-10-CM

## 2024-06-26 DIAGNOSIS — J44.9 CHRONIC OBSTRUCTIVE PULMONARY DISEASE, UNSPECIFIED COPD TYPE (HCC): ICD-10-CM

## 2024-06-26 PROBLEM — U07.1 COVID: Status: RESOLVED | Noted: 2023-11-27 | Resolved: 2024-06-26

## 2024-06-26 PROBLEM — I10 PRIMARY HYPERTENSION: Status: RESOLVED | Noted: 2022-11-04 | Resolved: 2024-06-26

## 2024-06-26 LAB
BILIRUB UR QL: NEGATIVE
GLUCOSE UR QL STRIP.AUTO: NEGATIVE MG/DL
KETONES UR-MCNC: NEGATIVE MG/DL
LEUKOCYTE ESTERASE UR QL STRIP: NEGATIVE
NITRITE UR QL: NEGATIVE
PH UR: 6 (ref 5–9)
PROT UR QL: NEGATIVE MG/DL
RBC # UR STRIP: NEGATIVE
SP GR UR: 1.02 (ref 1–1.02)
UROBILINOGEN UR QL: 0.2 EU/DL (ref 0.2–1)

## 2024-06-26 PROCEDURE — G8417 CALC BMI ABV UP PARAM F/U: HCPCS | Performed by: INTERNAL MEDICINE

## 2024-06-26 PROCEDURE — G8427 DOCREV CUR MEDS BY ELIG CLIN: HCPCS | Performed by: INTERNAL MEDICINE

## 2024-06-26 PROCEDURE — 99214 OFFICE O/P EST MOD 30 MIN: CPT | Performed by: INTERNAL MEDICINE

## 2024-06-26 PROCEDURE — 1123F ACP DISCUSS/DSCN MKR DOCD: CPT | Performed by: INTERNAL MEDICINE

## 2024-06-26 PROCEDURE — 1036F TOBACCO NON-USER: CPT | Performed by: INTERNAL MEDICINE

## 2024-06-26 PROCEDURE — 3023F SPIROM DOC REV: CPT | Performed by: INTERNAL MEDICINE

## 2024-06-26 RX ORDER — CALCIUM CARBONATE 500(1250)
500 TABLET ORAL DAILY
COMMUNITY

## 2024-06-26 NOTE — DISCHARGE INSTRUCTIONS
Push your fluids continue at home medications.  Get up slowly and  1 position for about a minute before you start to walk.  Keep all abrasions clean.  See your primary care for recheck.  Return to ER for any worsening symptoms

## 2024-06-27 VITALS
HEART RATE: 92 BPM | SYSTOLIC BLOOD PRESSURE: 90 MMHG | HEIGHT: 70 IN | WEIGHT: 175 LBS | BODY MASS INDEX: 25.05 KG/M2 | DIASTOLIC BLOOD PRESSURE: 60 MMHG

## 2024-06-27 NOTE — PROGRESS NOTES
instructions for ortho stasis, including liberalizing salt, increasing water intake and using compression hose, if symtpoms continue, could try some medication at that time,  Follow up in this office in a few months.    Return in about 3 months (around 9/26/2024).

## 2024-07-05 ENCOUNTER — TELEPHONE (OUTPATIENT)
Dept: ONCOLOGY | Age: 79
End: 2024-07-05

## 2024-07-05 ENCOUNTER — HOSPITAL ENCOUNTER (OUTPATIENT)
Dept: LAB | Age: 79
End: 2024-07-05
Payer: MEDICARE

## 2024-07-05 DIAGNOSIS — D64.9 ANEMIA, UNSPECIFIED TYPE: ICD-10-CM

## 2024-07-05 DIAGNOSIS — C61 PROSTATE CANCER (HCC): ICD-10-CM

## 2024-07-05 DIAGNOSIS — D47.2 MGUS (MONOCLONAL GAMMOPATHY OF UNKNOWN SIGNIFICANCE): ICD-10-CM

## 2024-07-05 DIAGNOSIS — E53.8 B12 DEFICIENCY: ICD-10-CM

## 2024-07-05 LAB
ALBUMIN SERPL-MCNC: 3.8 G/DL (ref 3.2–4.6)
ALBUMIN/GLOB SERPL: 1 (ref 1–1.9)
ALP SERPL-CCNC: 76 U/L (ref 40–129)
ALT SERPL-CCNC: 23 U/L (ref 12–65)
ANION GAP SERPL CALC-SCNC: 12 MMOL/L (ref 9–18)
AST SERPL-CCNC: 31 U/L (ref 15–37)
BASOPHILS # BLD: 0 K/UL (ref 0–0.2)
BASOPHILS NFR BLD: 0 % (ref 0–2)
BILIRUB SERPL-MCNC: 0.6 MG/DL (ref 0–1.2)
BUN SERPL-MCNC: 19 MG/DL (ref 8–23)
CALCIUM SERPL-MCNC: 9.7 MG/DL (ref 8.8–10.2)
CHLORIDE SERPL-SCNC: 103 MMOL/L (ref 98–107)
CO2 SERPL-SCNC: 23 MMOL/L (ref 20–28)
CREAT SERPL-MCNC: 1.33 MG/DL (ref 0.8–1.3)
DIFFERENTIAL METHOD BLD: ABNORMAL
EOSINOPHIL # BLD: 0 K/UL (ref 0–0.8)
EOSINOPHIL NFR BLD: 1 % (ref 0.5–7.8)
ERYTHROCYTE [DISTWIDTH] IN BLOOD BY AUTOMATED COUNT: 16.3 % (ref 11.9–14.6)
GLOBULIN SER CALC-MCNC: 3.8 G/DL (ref 2.3–3.5)
GLUCOSE SERPL-MCNC: 108 MG/DL (ref 70–99)
HCT VFR BLD AUTO: 35 % (ref 41.1–50.3)
HGB BLD-MCNC: 11.3 G/DL (ref 13.6–17.2)
HGB RETIC QN AUTO: 36 PG (ref 29–35)
IMM GRANULOCYTES # BLD AUTO: 0 K/UL (ref 0–0.5)
IMM GRANULOCYTES NFR BLD AUTO: 1 % (ref 0–5)
IMM RETICS NFR: 10.9 % (ref 2.3–13.4)
KAPPA LC FREE SER-MCNC: 121 MG/L (ref 2.4–20.7)
KAPPA LC FREE/LAMBDA FREE SER: 9.2 (ref 0.2–0.8)
LAMBDA LC FREE SERPL-MCNC: 13.2 MG/L (ref 4.2–27.7)
LYMPHOCYTES # BLD: 1.7 K/UL (ref 0.5–4.6)
LYMPHOCYTES NFR BLD: 41 % (ref 13–44)
MCH RBC QN AUTO: 32.7 PG (ref 26.1–32.9)
MCHC RBC AUTO-ENTMCNC: 32.3 G/DL (ref 31.4–35)
MCV RBC AUTO: 101.2 FL (ref 82–102)
MONOCYTES # BLD: 0.4 K/UL (ref 0.1–1.3)
MONOCYTES NFR BLD: 9 % (ref 4–12)
NEUTS SEG # BLD: 2 K/UL (ref 1.7–8.2)
NEUTS SEG NFR BLD: 48 % (ref 43–78)
NRBC # BLD: 0 K/UL (ref 0–0.2)
PLATELET # BLD AUTO: 155 K/UL (ref 150–450)
PLATELET COMMENT: ADEQUATE
PMV BLD AUTO: 9.8 FL (ref 9.4–12.3)
POTASSIUM SERPL-SCNC: 4.4 MMOL/L (ref 3.5–5.1)
PROT SERPL-MCNC: 7.6 G/DL (ref 6.3–8.2)
RBC # BLD AUTO: 3.46 M/UL (ref 4.23–5.6)
RBC MORPH BLD: ABNORMAL
RETICS # AUTO: 0.05 M/UL (ref 0.03–0.1)
RETICS/RBC NFR AUTO: 1.3 % (ref 0.3–2)
SODIUM SERPL-SCNC: 138 MMOL/L (ref 136–145)
VIT B12 SERPL-MCNC: 360 PG/ML (ref 193–986)
WBC # BLD AUTO: 4.1 K/UL (ref 4.3–11.1)
WBC MORPH BLD: ABNORMAL

## 2024-07-05 PROCEDURE — 83521 IG LIGHT CHAINS FREE EACH: CPT

## 2024-07-05 PROCEDURE — 82607 VITAMIN B-12: CPT

## 2024-07-05 PROCEDURE — 85046 RETICYTE/HGB CONCENTRATE: CPT

## 2024-07-05 PROCEDURE — 80053 COMPREHEN METABOLIC PANEL: CPT

## 2024-07-05 PROCEDURE — 36415 COLL VENOUS BLD VENIPUNCTURE: CPT

## 2024-07-05 PROCEDURE — 84165 PROTEIN E-PHORESIS SERUM: CPT

## 2024-07-05 PROCEDURE — 82784 ASSAY IGA/IGD/IGG/IGM EACH: CPT

## 2024-07-05 PROCEDURE — 85025 COMPLETE CBC W/AUTO DIFF WBC: CPT

## 2024-07-05 PROCEDURE — 86334 IMMUNOFIX E-PHORESIS SERUM: CPT

## 2024-07-05 NOTE — TELEPHONE ENCOUNTER
Patient would like to move lab appointment to a day or two before. Explained to patient that lab will send results when they are complete before appointment, and patient expressed they prefer the lab to be a separate day so the doctor has results beforehand.

## 2024-07-10 ENCOUNTER — NURSE ONLY (OUTPATIENT)
Dept: PULMONOLOGY | Age: 79
End: 2024-07-10
Payer: OTHER GOVERNMENT

## 2024-07-10 DIAGNOSIS — R06.02 SHORTNESS OF BREATH: Primary | ICD-10-CM

## 2024-07-10 LAB
ALBUMIN SERPL ELPH-MCNC: 3.8 G/DL (ref 2.9–4.4)
ALBUMIN/GLOB SERPL: 1.1 (ref 0.7–1.7)
ALPHA1 GLOB SERPL ELPH-MCNC: 0.2 G/DL (ref 0–0.4)
ALPHA2 GLOB SERPL ELPH-MCNC: 0.7 G/DL (ref 0.4–1)
B-GLOBULIN SERPL ELPH-MCNC: 1 G/DL (ref 0.7–1.3)
FEV 1 , POC: 2.45 L
FEV1 % PRED, POC: 75 %
FEV1/FVC, POC: NORMAL
FVC % PRED, POC: 75 %
FVC, POC: NORMAL
GAMMA GLOB SERPL ELPH-MCNC: 1.7 G/DL (ref 0.4–1.8)
GLOBULIN SER-MCNC: 3.6 G/DL (ref 2.2–3.9)
IGA SERPL-MCNC: 97 MG/DL (ref 61–437)
IGG SERPL-MCNC: 2241 MG/DL (ref 603–1613)
IGM SERPL-MCNC: 74 MG/DL (ref 15–143)
INTERPRETATION SERPL IEP-IMP: ABNORMAL
M PROTEIN SERPL ELPH-MCNC: 1.3 G/DL
PROT SERPL-MCNC: 7.4 G/DL (ref 6–8.5)

## 2024-07-10 PROCEDURE — 94060 EVALUATION OF WHEEZING: CPT | Performed by: INTERNAL MEDICINE

## 2024-07-10 PROCEDURE — 94726 PLETHYSMOGRAPHY LUNG VOLUMES: CPT | Performed by: INTERNAL MEDICINE

## 2024-07-10 PROCEDURE — 94729 DIFFUSING CAPACITY: CPT | Performed by: INTERNAL MEDICINE

## 2024-07-10 ASSESSMENT — PULMONARY FUNCTION TESTS
FVC_PERCENT_PREDICTED_POC: 75
FEV1_PERCENT_PREDICTED_POC: 75

## 2024-07-11 ENCOUNTER — OFFICE VISIT (OUTPATIENT)
Dept: ORTHOPEDIC SURGERY | Age: 79
End: 2024-07-11

## 2024-07-11 VITALS — BODY MASS INDEX: 25.05 KG/M2 | HEIGHT: 70 IN | WEIGHT: 175 LBS

## 2024-07-11 DIAGNOSIS — M54.16 LUMBAR RADICULOPATHY: Primary | ICD-10-CM

## 2024-07-11 NOTE — PROGRESS NOTES
spine was performed  without contrast.      FINDINGS:  There are 5 nonrib-bearing lumbar-type vertebral bodies. Unchanged grade 1  anterolisthesis of L4 on L5 as well as retrolisthesis of L1 on L2. Vertebral  body heights are preserved. No fracture or aggressive marrow signal abnormality.  The conus medullaris terminates at L1. The cauda equina nerve roots are within  normal limits. Paravertebral soft tissues are unremarkable. There are prominent  degenerative changes with soft tissue marrow edema involving the left 12th  costovertebral junction. Multilevel degenerative disc disease with disc  desiccation throughout and varying degrees of disc space narrowing, most  advanced at L1-L2.     T11-T12: Diffuse disc bulge with bilateral facet arthropathy. No spinal canal  stenosis. Mild left neuroforaminal stenosis. Foraminal foramen is patent.     T12-L1: No spinal canal or neuroforaminal stenosis.     L1-L2: Retrolisthesis with diffuse disc bulge and bilateral facet arthropathy.  Unchanged mild bilateral neuroforaminal stenoses. No spinal canal stenosis.     L2-L3: Unchanged central disc protrusion with bilateral facet arthropathy. No  spinal canal stenosis or neuroforaminal stenosis.     L3-L4: Unchanged bilateral facet arthropathy and ligamentum flavum hypertrophy  without spinal canal or neuroforaminal stenosis.     L4-L5: Unchanged grade 1 anterolisthesis with disc uncovering and diffuse disc  bulge. Unchanged bilateral facet arthropathy with ligamentum flavum hypertrophy.  Unchanged mild spinal canal stenosis and mild to moderate right neuroforaminal  stenosis. The left neural foramen remains patent.     L5-S1: Unchanged bilateral facet arthropathy with a shallow left foraminal disc  protrusion. No spinal canal or right neuroforaminal stenosis. Unchanged moderate  narrowing of the left neural foramen.        IMPRESSION:     1. No significant change in the degenerative disc disease or facet arthropathy  in

## 2024-07-12 ENCOUNTER — CLINICAL DOCUMENTATION (OUTPATIENT)
Dept: ONCOLOGY | Age: 79
End: 2024-07-12

## 2024-07-12 ENCOUNTER — OFFICE VISIT (OUTPATIENT)
Dept: ONCOLOGY | Age: 79
End: 2024-07-12
Payer: MEDICARE

## 2024-07-12 ENCOUNTER — HOSPITAL ENCOUNTER (OUTPATIENT)
Dept: INFUSION THERAPY | Age: 79
Setting detail: INFUSION SERIES
Discharge: HOME | End: 2024-07-12
Payer: OTHER GOVERNMENT

## 2024-07-12 VITALS
RESPIRATION RATE: 18 BRPM | SYSTOLIC BLOOD PRESSURE: 144 MMHG | OXYGEN SATURATION: 99 % | DIASTOLIC BLOOD PRESSURE: 86 MMHG | HEART RATE: 63 BPM | TEMPERATURE: 97.7 F

## 2024-07-12 VITALS
BODY MASS INDEX: 25.2 KG/M2 | TEMPERATURE: 98.9 F | DIASTOLIC BLOOD PRESSURE: 78 MMHG | HEART RATE: 66 BPM | RESPIRATION RATE: 22 BRPM | HEIGHT: 70 IN | WEIGHT: 176 LBS | OXYGEN SATURATION: 99 % | SYSTOLIC BLOOD PRESSURE: 137 MMHG

## 2024-07-12 DIAGNOSIS — D47.2 MGUS (MONOCLONAL GAMMOPATHY OF UNKNOWN SIGNIFICANCE): ICD-10-CM

## 2024-07-12 DIAGNOSIS — J44.9 CHRONIC OBSTRUCTIVE PULMONARY DISEASE, UNSPECIFIED COPD TYPE (HCC): ICD-10-CM

## 2024-07-12 DIAGNOSIS — C61 PROSTATE CANCER (HCC): Primary | ICD-10-CM

## 2024-07-12 DIAGNOSIS — C61 PROSTATE CANCER (HCC): ICD-10-CM

## 2024-07-12 DIAGNOSIS — E53.8 B12 DEFICIENCY: ICD-10-CM

## 2024-07-12 DIAGNOSIS — E53.8 B12 DEFICIENCY: Primary | ICD-10-CM

## 2024-07-12 DIAGNOSIS — D64.9 ANEMIA, UNSPECIFIED TYPE: ICD-10-CM

## 2024-07-12 PROCEDURE — 96402 CHEMO HORMON ANTINEOPL SQ/IM: CPT

## 2024-07-12 PROCEDURE — 6360000002 HC RX W HCPCS: Performed by: INTERNAL MEDICINE

## 2024-07-12 PROCEDURE — 99214 OFFICE O/P EST MOD 30 MIN: CPT | Performed by: INTERNAL MEDICINE

## 2024-07-12 PROCEDURE — 1123F ACP DISCUSS/DSCN MKR DOCD: CPT | Performed by: INTERNAL MEDICINE

## 2024-07-12 PROCEDURE — G8428 CUR MEDS NOT DOCUMENT: HCPCS | Performed by: INTERNAL MEDICINE

## 2024-07-12 PROCEDURE — 96372 THER/PROPH/DIAG INJ SC/IM: CPT

## 2024-07-12 PROCEDURE — G8417 CALC BMI ABV UP PARAM F/U: HCPCS | Performed by: INTERNAL MEDICINE

## 2024-07-12 PROCEDURE — 1036F TOBACCO NON-USER: CPT | Performed by: INTERNAL MEDICINE

## 2024-07-12 PROCEDURE — 6360000002 HC RX W HCPCS: Performed by: PHYSICIAN ASSISTANT

## 2024-07-12 PROCEDURE — 3023F SPIROM DOC REV: CPT | Performed by: INTERNAL MEDICINE

## 2024-07-12 RX ORDER — ACETAMINOPHEN 325 MG/1
650 TABLET ORAL
OUTPATIENT
Start: 2024-08-09

## 2024-07-12 RX ORDER — SODIUM CHLORIDE 9 MG/ML
INJECTION, SOLUTION INTRAVENOUS CONTINUOUS
OUTPATIENT
Start: 2024-08-09

## 2024-07-12 RX ORDER — ALBUTEROL SULFATE 90 UG/1
4 AEROSOL, METERED RESPIRATORY (INHALATION) PRN
OUTPATIENT
Start: 2024-08-09

## 2024-07-12 RX ORDER — LANOLIN ALCOHOL/MO/W.PET/CERES
1000 CREAM (GRAM) TOPICAL DAILY
COMMUNITY

## 2024-07-12 RX ORDER — SODIUM CHLORIDE 9 MG/ML
INJECTION, SOLUTION INTRAVENOUS CONTINUOUS
OUTPATIENT
Start: 2024-12-27

## 2024-07-12 RX ORDER — DIPHENHYDRAMINE HYDROCHLORIDE 50 MG/ML
50 INJECTION INTRAMUSCULAR; INTRAVENOUS
OUTPATIENT
Start: 2024-12-27

## 2024-07-12 RX ORDER — EPINEPHRINE 1 MG/ML
0.3 INJECTION, SOLUTION, CONCENTRATE INTRAVENOUS PRN
OUTPATIENT
Start: 2024-08-09

## 2024-07-12 RX ORDER — EPINEPHRINE 1 MG/ML
0.3 INJECTION, SOLUTION, CONCENTRATE INTRAVENOUS PRN
OUTPATIENT
Start: 2024-12-27

## 2024-07-12 RX ORDER — ONDANSETRON 2 MG/ML
8 INJECTION INTRAMUSCULAR; INTRAVENOUS
OUTPATIENT
Start: 2024-12-27

## 2024-07-12 RX ORDER — DIPHENHYDRAMINE HYDROCHLORIDE 50 MG/ML
50 INJECTION INTRAMUSCULAR; INTRAVENOUS
OUTPATIENT
Start: 2024-08-09

## 2024-07-12 RX ORDER — CYANOCOBALAMIN 1000 UG/ML
1000 INJECTION, SOLUTION INTRAMUSCULAR; SUBCUTANEOUS ONCE
Status: COMPLETED | OUTPATIENT
Start: 2024-07-12 | End: 2024-07-12

## 2024-07-12 RX ORDER — ACETAMINOPHEN 325 MG/1
650 TABLET ORAL
OUTPATIENT
Start: 2024-12-27

## 2024-07-12 RX ORDER — CYANOCOBALAMIN 1000 UG/ML
1000 INJECTION, SOLUTION INTRAMUSCULAR; SUBCUTANEOUS ONCE
OUTPATIENT
Start: 2024-08-09 | End: 2024-08-09

## 2024-07-12 RX ORDER — ALBUTEROL SULFATE 90 UG/1
4 AEROSOL, METERED RESPIRATORY (INHALATION) PRN
OUTPATIENT
Start: 2024-12-27

## 2024-07-12 RX ORDER — ONDANSETRON 2 MG/ML
8 INJECTION INTRAMUSCULAR; INTRAVENOUS
OUTPATIENT
Start: 2024-08-09

## 2024-07-12 RX ADMIN — LEUPROLIDE ACETATE 45 MG: KIT at 13:00

## 2024-07-12 RX ADMIN — CYANOCOBALAMIN 1000 MCG: 1000 INJECTION INTRAMUSCULAR; SUBCUTANEOUS at 13:00

## 2024-07-12 ASSESSMENT — PATIENT HEALTH QUESTIONNAIRE - PHQ9
SUM OF ALL RESPONSES TO PHQ QUESTIONS 1-9: 0
1. LITTLE INTEREST OR PLEASURE IN DOING THINGS: NOT AT ALL
SUM OF ALL RESPONSES TO PHQ QUESTIONS 1-9: 0
2. FEELING DOWN, DEPRESSED OR HOPELESS: NOT AT ALL
SUM OF ALL RESPONSES TO PHQ9 QUESTIONS 1 & 2: 0
SUM OF ALL RESPONSES TO PHQ QUESTIONS 1-9: 0
SUM OF ALL RESPONSES TO PHQ QUESTIONS 1-9: 0

## 2024-07-12 NOTE — PROGRESS NOTES
[facility]    Chief Complaint:    Chief Complaint   Patient presents with    Follow-up         History of Present Illness:  Mr. Bailey is a 78 y.o. male who presents today for follow-up regarding MGUS and anemia.  He is a patient of Dr. Richardson and Dr. Mota for localized prostate cancer, s/p EBRT with ongoing ADT as well.  His Hgb was noted to be down to 12.0 and he [facility] an [facility], his M-spike was 1.1 with normal calcium, creatinine of 1.4 and Hgb of 12.  He was referred to hematology for recommendations, because his renal function and Hgb were not low enough to meet MM criteria, we recommended observation with repeat paraprotein studies in 3 months.  Repeat M-spike was 1.0 with IgG of 1729 and kappa FLC of 135.  However, his B12 level was low at 189, which may account for his slight fatigue and his anemia, especially since his MCV is elevated.  We will start monthly B12 replacement and recheck his labs in 3 months.  I suspect the fatigue he is experiencing is more from the ADT or the B12 deficiency rather than anything to do with his MGUS.  Because his renal function and Hgb were not low enough to meet MM criteria, we recommended observation with repeat paraprotein studies in 3 months.      He returns today for follow-up.  He has had a couple of syncopal episodes, work-up with cardiology and pulmonology is ongoing.  He continues to follow with urology and rad onc for his history of prostate cancer, next Lupron due today.  He has been compliant with B12 injections, no clinical change on these.      Review of Systems:  Constitutional: Negative.   HENT: Negative.   Eyes: Negative.   Respiratory: Negative.   Cardiovascular: Negative.   Gastrointestinal: Negative.   Genitourinary: Negative.   Musculoskeletal: Negative.   Skin: Negative.   Neurological: Negative.   Endo/Heme/Allergies: Negative.

## 2024-07-12 NOTE — PATIENT INSTRUCTIONS
Total Protein 07/05/2024 7.6  6.3 - 8.2 g/dL Final    Albumin 07/05/2024 3.8  3.2 - 4.6 g/dL Final    Globulin 07/05/2024 3.8 (H)  2.3 - 3.5 g/dL Final    Albumin/Globulin Ratio 07/05/2024 1.0  1.0 - 1.9   Final             Please refer to After Visit Summary or [employer] for upcoming appointment information. If you have any questions regarding your upcoming schedule please reach out to your care team through [employer] or call [phone].    Please notify your assigned Nurse Navigator of any unplanned hospital admissions or Emergency Room visits within 24 hours of discharge.    -------------------------------------------------------------------------------------------------------------------  Please call our office at [phone] if you have any  of the following symptoms:   Fever of 100.5 or greater  Chills  Shortness of breath  Swelling or pain in one leg    After office hours an answering service is available and will contact a provider for emergencies or if you are experiencing any of the above symptoms.        CHING CHARLTON RN

## 2024-07-12 NOTE — PROGRESS NOTES
Per Dr Richardson 6 months Lupron can be given today. Will see in office in August to follow up. Spoke with Jami Velásquez in infusion to clarify. Pt was seen today by Dr Hook as well.

## 2024-07-12 NOTE — PROGRESS NOTES
Arrived to the Infusion Center.  Lupron + B12 completed.   Provided education on Lupron    Patient instructed to report any side affects to ordering provider.  Patient tolerated Yes.   Any issues or concerns during appointment: NO.  /Patient aware of next infusion appointment on  7/29 (date) at 9:00 (time).  Discharged 1:09.pm

## 2024-07-12 NOTE — PROGRESS NOTES
through the left arm    REVIEW OF SYSTEMS:   10 point review of systems is negative except as reported in HPI.    PHYSICAL EXAM:   Vitals:    07/16/24 1306   BP: 116/66   Pulse: 71   Resp: 17   Temp: 97.5 °F (36.4 °C)   TempSrc: Infrared   SpO2: 99%   Weight: 79.4 kg (175 lb)   Height: 1.778 m (5' 10\")       Body mass index is 25.11 kg/m².      General:   Alert, cooperative, no distress, appears stated age.        Eyes/Ears/Nose:   Conjunctivae/corneas clear. PERRL. Nasal mucosa is normal.  Normal TMs and external auditory canals.        Mouth/Throat:  Lips, mucosa, and tongue normal. Teeth and gums normal.        Lungs:     clear without crackles     Heart:   Regular rate and rhythm, S1, S2 normal, no murmur, click, rub or gallop.     Abdomen:    Soft, non-tender.     Extremities:  Extremities normal, atraumatic, no cyanosis or edema.     Skin:  Skin color normal. No rashes or lesions     Neurologic:  A&Ox3     DIAGNOSTIC TESTS:                                                                                                                        Pulmonary Function Testing: isolated reduction in diffusing capacity.  Date   3/6/2024 7/10/2024       FVC 3.54 (90%) 3.68 (84%)       FEV1 2.60 (89%) 2.77 (85%)       FEV1/FVC 0.73 0.75       FEF 25-75%         TLC  6.38 (90%)       FRC  3.36 (88%)       RV  2.10 (80%)       DLCO  11.0 (36%)         AMBULATING OXIMETRY: 3/6/2024  O2 sat HR   Room air at Rest 95% 70bpm   Room air ambulating 94% 96bpm     LABS:   Lab Results   Component Value Date/Time    WBC 4.1 07/05/2024 10:52 AM    HGB 11.3 07/05/2024 10:52 AM    HCT 35.0 07/05/2024 10:52 AM     07/05/2024 10:52 AM    TSH 2.430 04/05/2024 11:09 AM    NTPROBNP 113 06/25/2024 07:57 PM     Imaging: I performed an independent interpretation of the patient's images.  CXR:   Chest X-ray 6/25/2024     INDICATION: Shortness of     COMPARISON: May 27, 2020     TECHNIQUE: AP/PA view of the chest was obtained.     FINDINGS:

## 2024-07-15 ENCOUNTER — OFFICE VISIT (OUTPATIENT)
Dept: ORTHOPEDIC SURGERY | Age: 79
End: 2024-07-15
Payer: MEDICARE

## 2024-07-15 DIAGNOSIS — M47.816 SPONDYLOSIS WITHOUT MYELOPATHY OR RADICULOPATHY, LUMBAR REGION: ICD-10-CM

## 2024-07-15 DIAGNOSIS — M48.062 SPINAL STENOSIS OF LUMBAR REGION WITH NEUROGENIC CLAUDICATION: ICD-10-CM

## 2024-07-15 DIAGNOSIS — M51.36 DDD (DEGENERATIVE DISC DISEASE), LUMBAR: ICD-10-CM

## 2024-07-15 DIAGNOSIS — M54.16 LUMBAR RADICULOPATHY: Primary | ICD-10-CM

## 2024-07-15 PROCEDURE — 64483 NJX AA&/STRD TFRM EPI L/S 1: CPT | Performed by: PHYSICAL MEDICINE & REHABILITATION

## 2024-07-15 RX ORDER — TRIAMCINOLONE ACETONIDE 40 MG/ML
40 INJECTION, SUSPENSION INTRA-ARTICULAR; INTRAMUSCULAR ONCE
Status: COMPLETED | OUTPATIENT
Start: 2024-07-15 | End: 2024-07-15

## 2024-07-15 RX ADMIN — TRIAMCINOLONE ACETONIDE 40 MG: 40 INJECTION, SUSPENSION INTRA-ARTICULAR; INTRAMUSCULAR at 14:35

## 2024-07-15 NOTE — PROGRESS NOTES
Name: Darwin Bailey  YOB: 1945  Gender: male  MRN: [id number]        Transforaminal RELL Procedure Note    Procedure: Right  L5-S1 transforaminal epidural steroid injections     Precautions: Darwin Bailey denies prior sensitivity to steroid, local anesthetic, iodine, or shellfish.       Consent:  Consent was obtained prior to the procedure. The procedure was discussed at length with Darwin Bailey. He was given the opportunity to ask questions regarding the procedure and its associated risks.  In addition to the potential risks associated with the procedure itself, the patient was informed both verbally and in writing of potential side effects of the use glucocorticoids.  The patient appeared to comprehend the informed consent and desired to have the procedure performed, and informed consent was signed.     He was placed in a prone position on the fluoroscopy table and the skin was prepped and draped in a routine sterile fashion. The areas to be injected were each anesthetized with 1 ml of 1% Lidocaine. A 22 gauge 3.5 inch spinal needle was carefully advanced under fluoroscopic guidance to the right L5-S1 transforaminal space  0.5 ml of 70% of Omnipaque was injected to confirm proper needle placement and absence of subdural or vascular flow Once proper placement was confirmed, 0.5ml of 0.25 marcaine and 40 mg kenalog were injected through the spinal needle.     Fluoroscopic guidance was used intermittently over a 10-minute period to insure proper needle placement and his safety. A hard copy of the fluoroscopic image has been placed in his chart and is saved on the C-arm hard drive. He was monitored for 30 minutes after the procedure and discharged home in a stable fashion with a routine follow up.    Procedural Diagnosis:     ICD-10-CM    1. Lumbar radiculopathy  M54.16 FL NERVE BLOCK LUMBOSACRAL 1ST     triamcinolone acetonide (KENALOG-40) injection 40 mg      2. Spondylosis

## 2024-07-16 ENCOUNTER — OFFICE VISIT (OUTPATIENT)
Dept: PULMONOLOGY | Age: 79
End: 2024-07-16
Payer: OTHER GOVERNMENT

## 2024-07-16 VITALS
HEART RATE: 71 BPM | RESPIRATION RATE: 17 BRPM | TEMPERATURE: 97.5 F | BODY MASS INDEX: 25.05 KG/M2 | OXYGEN SATURATION: 99 % | SYSTOLIC BLOOD PRESSURE: 116 MMHG | WEIGHT: 175 LBS | DIASTOLIC BLOOD PRESSURE: 66 MMHG | HEIGHT: 70 IN

## 2024-07-16 DIAGNOSIS — R42 LIGHTHEADEDNESS: ICD-10-CM

## 2024-07-16 DIAGNOSIS — J43.2 CENTRILOBULAR EMPHYSEMA (HCC): ICD-10-CM

## 2024-07-16 DIAGNOSIS — R06.02 SHORTNESS OF BREATH: Primary | ICD-10-CM

## 2024-07-16 PROCEDURE — G8427 DOCREV CUR MEDS BY ELIG CLIN: HCPCS | Performed by: INTERNAL MEDICINE

## 2024-07-16 PROCEDURE — 1036F TOBACCO NON-USER: CPT | Performed by: INTERNAL MEDICINE

## 2024-07-16 PROCEDURE — G8417 CALC BMI ABV UP PARAM F/U: HCPCS | Performed by: INTERNAL MEDICINE

## 2024-07-16 PROCEDURE — 99214 OFFICE O/P EST MOD 30 MIN: CPT | Performed by: INTERNAL MEDICINE

## 2024-07-16 PROCEDURE — 3023F SPIROM DOC REV: CPT | Performed by: INTERNAL MEDICINE

## 2024-07-16 PROCEDURE — 1123F ACP DISCUSS/DSCN MKR DOCD: CPT | Performed by: INTERNAL MEDICINE

## 2024-07-16 RX ORDER — BUDESONIDE, GLYCOPYRROLATE, AND FORMOTEROL FUMARATE 160; 9; 4.8 UG/1; UG/1; UG/1
2 AEROSOL, METERED RESPIRATORY (INHALATION) 2 TIMES DAILY
Qty: 3 EACH | Refills: 3 | Status: SHIPPED | OUTPATIENT
Start: 2024-07-16

## 2024-07-22 ENCOUNTER — OFFICE VISIT (OUTPATIENT)
Dept: VASCULAR SURGERY | Age: 79
End: 2024-07-22
Payer: MEDICARE

## 2024-07-22 VITALS
HEIGHT: 70 IN | BODY MASS INDEX: 24.48 KG/M2 | SYSTOLIC BLOOD PRESSURE: 136 MMHG | DIASTOLIC BLOOD PRESSURE: 92 MMHG | OXYGEN SATURATION: 100 % | WEIGHT: 171 LBS | HEART RATE: 70 BPM

## 2024-07-22 DIAGNOSIS — R42 DIZZINESS: ICD-10-CM

## 2024-07-22 DIAGNOSIS — R55 SYNCOPE, UNSPECIFIED SYNCOPE TYPE: ICD-10-CM

## 2024-07-22 DIAGNOSIS — I65.23 BILATERAL CAROTID ARTERY STENOSIS: Primary | ICD-10-CM

## 2024-07-22 DIAGNOSIS — I95.9 HYPOTENSION, UNSPECIFIED HYPOTENSION TYPE: ICD-10-CM

## 2024-07-22 PROCEDURE — 1036F TOBACCO NON-USER: CPT | Performed by: NURSE PRACTITIONER

## 2024-07-22 PROCEDURE — 99214 OFFICE O/P EST MOD 30 MIN: CPT | Performed by: NURSE PRACTITIONER

## 2024-07-22 PROCEDURE — 1123F ACP DISCUSS/DSCN MKR DOCD: CPT | Performed by: NURSE PRACTITIONER

## 2024-07-22 PROCEDURE — G8427 DOCREV CUR MEDS BY ELIG CLIN: HCPCS | Performed by: NURSE PRACTITIONER

## 2024-07-22 PROCEDURE — G8420 CALC BMI NORM PARAMETERS: HCPCS | Performed by: NURSE PRACTITIONER
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
## 2024-07-24 ASSESSMENT — ENCOUNTER SYMPTOMS
WHEEZING: 0
SHORTNESS OF BREATH: 0
CHEST TIGHTNESS: 0

## 2024-07-29 ENCOUNTER — HOSPITAL ENCOUNTER (OUTPATIENT)
Dept: LAB | Age: 79
Discharge: HOME | End: 2024-08-01
Payer: MEDICARE

## 2024-07-29 DIAGNOSIS — C61 PROSTATE CANCER (HCC): ICD-10-CM

## 2024-07-29 DIAGNOSIS — R97.20 ELEVATED PSA: Primary | ICD-10-CM

## 2024-07-29 LAB — PSA SERPL-MCNC: <0.1 NG/ML (ref 0–4)

## 2024-07-29 PROCEDURE — 36415 COLL VENOUS BLD VENIPUNCTURE: CPT

## 2024-07-29 PROCEDURE — 84403 ASSAY OF TOTAL TESTOSTERONE: CPT

## 2024-07-29 PROCEDURE — 84153 ASSAY OF PSA TOTAL: CPT

## 2024-07-30 DIAGNOSIS — J43.2 CENTRILOBULAR EMPHYSEMA (HCC): Primary | ICD-10-CM

## 2024-07-30 LAB — TESTOST SERPL-MCNC: <3 NG/DL (ref 264–916)

## 2024-07-30 NOTE — TELEPHONE ENCOUNTER
[facility] says [facility] and why patient needs the prescription Breztri ., they have faxed the paperwork to be completed

## 2024-07-30 NOTE — PROGRESS NOTES
Urologic Oncology  [facility] Hematology & Oncology  [address] [place] [id number]  [phone]        Mr. Darwin Bailey is a 78 y.o. male with a diagnosis of prostate cancer. S/P TRUS fusion prostate bx, PCa, kelvin score 4+4=8 on 11/10/22. Stage IIC (T1c, NO, MO, PSA 6.8, GG 4). S/p XRT 78 GY completed 3/3/23 with planned 2 yr of ADT    Lupron 12/19/22 Q6  Lupron 6/23/23 Q6  Lupron 1/26/24 Q6  Lupron 7/12/24 Q6      INTERVAL HISTORY:    Patient returns today for follow-up evaluation of his GS 4+4=8 prostate cancer status post XRT with 2 years ADT.  He received his final every 6 month Lupron on 7/12/2024.  He does endorse some fatigue though not significantly limiting to his daily activities.  Hot flashes are mild.  He had labs on 7/29/2024 that demonstrated PSA less than 0.1 and a total testosterone less than 3.    He denies any significant lower urinary tract symptoms including dysuria or hematuria.    From previous note on 1/26/24:    Past medical, family and social histories, as well as medications and allergies, were reviewed and updated in the medical record as appropriate.    PMH:     Past Medical History:   Diagnosis Date    Abdominal aneurysm without mention of rupture 9/30/2015    [id number], on 5/1/2012 per [facility] screen    Benign localized hyperplasia of prostate without urinary obstruction and other lower urinary tract symptoms (LUTS) 9/30/2015    V.A. urologist    Bladder neck obstruction 9/30/2015    Cancer (HCC) 02/2017    melanoma, rt ear    COPD (chronic obstructive pulmonary disease) (HCC)     FH: colonic polyps     GERD (gastroesophageal reflux disease)     H/O testicular mass 9/30/2015    V.A. Working up     HLD (hyperlipidemia)     Hypertension     controlled w/ med    Inguinal hernia with obstruction, without mention of gangrene, recurrent unilateral or unspecified 9/30/2015    Other and unspecified hyperlipidemia 9/30/2015    Other ill-defined conditions(799.89)

## 2024-07-31 ENCOUNTER — OFFICE VISIT (OUTPATIENT)
Dept: INTERNAL MEDICINE CLINIC | Facility: CLINIC | Age: 79
End: 2024-07-31
Payer: MEDICARE

## 2024-07-31 VITALS
SYSTOLIC BLOOD PRESSURE: 106 MMHG | DIASTOLIC BLOOD PRESSURE: 64 MMHG | HEIGHT: 70 IN | WEIGHT: 174 LBS | BODY MASS INDEX: 24.91 KG/M2

## 2024-07-31 DIAGNOSIS — I71.40 ABDOMINAL AORTIC ANEURYSM (AAA) 3.0 CM TO 5.5 CM IN DIAMETER IN MALE (HCC): ICD-10-CM

## 2024-07-31 DIAGNOSIS — N18.32 STAGE 3B CHRONIC KIDNEY DISEASE (HCC): ICD-10-CM

## 2024-07-31 DIAGNOSIS — E53.8 B12 DEFICIENCY: ICD-10-CM

## 2024-07-31 DIAGNOSIS — H69.91 DYSFUNCTION OF RIGHT EUSTACHIAN TUBE: ICD-10-CM

## 2024-07-31 DIAGNOSIS — R73.03 PREDIABETES: ICD-10-CM

## 2024-07-31 DIAGNOSIS — I65.23 BILATERAL CAROTID ARTERY STENOSIS: ICD-10-CM

## 2024-07-31 DIAGNOSIS — J44.9 CHRONIC OBSTRUCTIVE PULMONARY DISEASE, UNSPECIFIED COPD TYPE (HCC): ICD-10-CM

## 2024-07-31 DIAGNOSIS — E78.00 PURE HYPERCHOLESTEROLEMIA: ICD-10-CM

## 2024-07-31 DIAGNOSIS — H61.21 IMPACTED CERUMEN OF RIGHT EAR: Primary | ICD-10-CM

## 2024-07-31 PROCEDURE — G8427 DOCREV CUR MEDS BY ELIG CLIN: HCPCS | Performed by: NURSE PRACTITIONER

## 2024-07-31 PROCEDURE — 1036F TOBACCO NON-USER: CPT | Performed by: NURSE PRACTITIONER

## 2024-07-31 PROCEDURE — 3023F SPIROM DOC REV: CPT | Performed by: NURSE PRACTITIONER

## 2024-07-31 PROCEDURE — 99214 OFFICE O/P EST MOD 30 MIN: CPT | Performed by: NURSE PRACTITIONER

## 2024-07-31 PROCEDURE — G8420 CALC BMI NORM PARAMETERS: HCPCS | Performed by: NURSE PRACTITIONER

## 2024-07-31 PROCEDURE — 1123F ACP DISCUSS/DSCN MKR DOCD: CPT | Performed by: NURSE PRACTITIONER

## 2024-07-31 RX ORDER — FLUTICASONE PROPIONATE 50 MCG
2 SPRAY, SUSPENSION (ML) NASAL DAILY
Qty: 16 G | Refills: 5 | Status: SHIPPED | OUTPATIENT
Start: 2024-07-31

## 2024-07-31 ASSESSMENT — ENCOUNTER SYMPTOMS
SHORTNESS OF BREATH: 1
COUGH: 0
SINUS PAIN: 0
SORE THROAT: 1
SINUS PRESSURE: 0

## 2024-07-31 ASSESSMENT — PATIENT HEALTH QUESTIONNAIRE - PHQ9
SUM OF ALL RESPONSES TO PHQ QUESTIONS 1-9: 0
SUM OF ALL RESPONSES TO PHQ9 QUESTIONS 1 & 2: 0
2. FEELING DOWN, DEPRESSED OR HOPELESS: NOT AT ALL
SUM OF ALL RESPONSES TO PHQ QUESTIONS 1-9: 0
1. LITTLE INTEREST OR PLEASURE IN DOING THINGS: NOT AT ALL

## 2024-07-31 NOTE — PROGRESS NOTES
PROGRESS NOTE      Chief Complaint   Patient presents with    Cholesterol Problem     5 month f/u    Ear Fullness     Right ear feels \"stopped\" up. Pain with swallowing       HPI    Right ear pain: Feels like fluid with throat drainage. Several weeks. Decreased hearing. No sinus congestion. No fever, chills.     Dyspnea on exertion: Discussed at visit 2/2024. Referred to cardiology and pulmonary. Stress test Dr Walls 3/2024 normal but patient reports he did not hear about results. Will see Dr Carrera in September    COPD: Trial of Breztri per pulmonary - Dr Mckeon    Carotid artery disease: Bilateral ultrasound without significant stenosis. CTA of head ordered but has not heard from radiology    AAA: 4.3 cm on 7/2024 ultrasound per vascular. Stable. No intervention until 5 1/2 cm. 6 mo follow up      History of melanoma and squamous cell carcinoma: every 6 month eval by dermatology     Prostate cancer: Dr Richardson     Abnormal serum proteins: Followed by oncolgy - Dr Hook.     S/p clavicle fracture: 12/2023     History of hypertension: Off of chlorthalidone with dizziness     Sciatica: S/p epidural  with benefit             Past Medical History, Past Surgical History, Family history, Social History, and Medications were all reviewed and updated as necessary.     Current Outpatient Medications   Medication Sig Dispense Refill    fluticasone (FLONASE) 50 MCG/ACT nasal spray 2 sprays by Each Nostril route daily 16 g 5    vitamin B-12 (CYANOCOBALAMIN) 1000 MCG tablet Take 1 tablet by mouth daily      calcium carbonate (OSCAL) 500 MG TABS tablet Take 1 tablet by mouth daily      Phenylephrine-APAP-guaiFENesin (TYLENOL SINUS SEVERE PO) Take by mouth daily as needed      albuterol sulfate HFA (PROAIR HFA) 108 (90 Base) MCG/ACT inhaler Inhale 2 puffs into the lungs every 6 hours as needed for Wheezing 3 each 3    aspirin 81 MG EC tablet Take 1 tablet by mouth daily      Cholecalciferol (VITAMIN D-3) 25 MCG (1000 UT) CAPS

## 2024-08-02 ENCOUNTER — OFFICE VISIT (OUTPATIENT)
Dept: ONCOLOGY | Age: 79
End: 2024-08-02
Payer: MEDICARE

## 2024-08-02 VITALS
SYSTOLIC BLOOD PRESSURE: 116 MMHG | RESPIRATION RATE: 16 BRPM | WEIGHT: 173.8 LBS | HEART RATE: 67 BPM | TEMPERATURE: 97.4 F | DIASTOLIC BLOOD PRESSURE: 83 MMHG | BODY MASS INDEX: 24.88 KG/M2 | OXYGEN SATURATION: 99 % | HEIGHT: 70 IN

## 2024-08-02 DIAGNOSIS — C61 PROSTATE CANCER (HCC): Primary | ICD-10-CM

## 2024-08-02 PROCEDURE — 1036F TOBACCO NON-USER: CPT | Performed by: UROLOGY

## 2024-08-02 PROCEDURE — G8420 CALC BMI NORM PARAMETERS: HCPCS | Performed by: UROLOGY

## 2024-08-02 PROCEDURE — 1123F ACP DISCUSS/DSCN MKR DOCD: CPT | Performed by: UROLOGY

## 2024-08-02 PROCEDURE — 99213 OFFICE O/P EST LOW 20 MIN: CPT | Performed by: UROLOGY

## 2024-08-02 PROCEDURE — G8427 DOCREV CUR MEDS BY ELIG CLIN: HCPCS | Performed by: UROLOGY

## 2024-08-02 ASSESSMENT — PATIENT HEALTH QUESTIONNAIRE - PHQ9
SUM OF ALL RESPONSES TO PHQ QUESTIONS 1-9: 0
SUM OF ALL RESPONSES TO PHQ QUESTIONS 1-9: 0
1. LITTLE INTEREST OR PLEASURE IN DOING THINGS: NOT AT ALL
SUM OF ALL RESPONSES TO PHQ QUESTIONS 1-9: 0
SUM OF ALL RESPONSES TO PHQ9 QUESTIONS 1 & 2: 0
2. FEELING DOWN, DEPRESSED OR HOPELESS: NOT AT ALL
SUM OF ALL RESPONSES TO PHQ QUESTIONS 1-9: 0

## 2024-08-06 ENCOUNTER — HOSPITAL ENCOUNTER (OUTPATIENT)
Dept: CT IMAGING | Age: 79
Discharge: HOME | End: 2024-08-09
Payer: MEDICARE

## 2024-08-06 DIAGNOSIS — I65.23 BILATERAL CAROTID ARTERY STENOSIS: ICD-10-CM

## 2024-08-06 LAB — CREAT BLD-MCNC: 1.32 MG/DL (ref 0.8–1.5)

## 2024-08-06 PROCEDURE — 6360000004 HC RX CONTRAST MEDICATION: Performed by: NURSE PRACTITIONER

## 2024-08-06 PROCEDURE — 70496 CT ANGIOGRAPHY HEAD: CPT

## 2024-08-06 PROCEDURE — 70496 CT ANGIOGRAPHY HEAD: CPT | Performed by: RADIOLOGY

## 2024-08-06 PROCEDURE — 70498 CT ANGIOGRAPHY NECK: CPT | Performed by: RADIOLOGY

## 2024-08-06 PROCEDURE — 82565 ASSAY OF CREATININE: CPT

## 2024-08-06 RX ADMIN — IOPAMIDOL 100 ML: 755 INJECTION, SOLUTION INTRAVENOUS at 10:01

## 2024-08-09 ENCOUNTER — TELEPHONE (OUTPATIENT)
Dept: ORTHOPEDIC SURGERY | Age: 79
End: 2024-08-09

## 2024-08-09 RX ORDER — FLUTICASONE PROPIONATE AND SALMETEROL 250; 50 UG/1; UG/1
1 POWDER RESPIRATORY (INHALATION) EVERY 12 HOURS
Qty: 3 EACH | Refills: 3 | Status: SHIPPED | OUTPATIENT
Start: 2024-08-09

## 2024-08-09 NOTE — TELEPHONE ENCOUNTER
[facility] from [facility] patient needs to try and fail on these medications before will approve the Breztri.    Per Dr. Mckeon switch to Wixela 250/50 and spiriva 2.5mg.     Rx placed and routed to Dr. Mckeon to sign.

## 2024-08-12 ENCOUNTER — OFFICE VISIT (OUTPATIENT)
Dept: ENT CLINIC | Age: 79
End: 2024-08-12
Payer: MEDICARE

## 2024-08-12 VITALS — RESPIRATION RATE: 16 BRPM | HEIGHT: 70 IN | BODY MASS INDEX: 24.77 KG/M2 | WEIGHT: 173 LBS

## 2024-08-12 DIAGNOSIS — M26.629 TMJ SYNDROME: ICD-10-CM

## 2024-08-12 DIAGNOSIS — H61.23 BILATERAL IMPACTED CERUMEN: ICD-10-CM

## 2024-08-12 DIAGNOSIS — H92.01 OTALGIA, RIGHT: Primary | ICD-10-CM

## 2024-08-12 PROCEDURE — 69210 REMOVE IMPACTED EAR WAX UNI: CPT | Performed by: STUDENT IN AN ORGANIZED HEALTH CARE EDUCATION/TRAINING PROGRAM

## 2024-08-12 PROCEDURE — 1123F ACP DISCUSS/DSCN MKR DOCD: CPT | Performed by: STUDENT IN AN ORGANIZED HEALTH CARE EDUCATION/TRAINING PROGRAM

## 2024-08-12 PROCEDURE — G8427 DOCREV CUR MEDS BY ELIG CLIN: HCPCS | Performed by: STUDENT IN AN ORGANIZED HEALTH CARE EDUCATION/TRAINING PROGRAM

## 2024-08-12 PROCEDURE — 99204 OFFICE O/P NEW MOD 45 MIN: CPT | Performed by: STUDENT IN AN ORGANIZED HEALTH CARE EDUCATION/TRAINING PROGRAM

## 2024-08-12 PROCEDURE — G8420 CALC BMI NORM PARAMETERS: HCPCS | Performed by: STUDENT IN AN ORGANIZED HEALTH CARE EDUCATION/TRAINING PROGRAM

## 2024-08-12 PROCEDURE — 1036F TOBACCO NON-USER: CPT | Performed by: STUDENT IN AN ORGANIZED HEALTH CARE EDUCATION/TRAINING PROGRAM

## 2024-08-12 ASSESSMENT — ENCOUNTER SYMPTOMS
CONSTIPATION: 0
NAUSEA: 0
SHORTNESS OF BREATH: 0
SINUS PRESSURE: 0
CHOKING: 0
APNEA: 0
STRIDOR: 0
DIARRHEA: 0
SINUS PAIN: 0
EYE ITCHING: 0
EYE DISCHARGE: 0
EYE PAIN: 0
COUGH: 0
FACIAL SWELLING: 0
WHEEZING: 0

## 2024-08-12 NOTE — PROGRESS NOTES
Status: He is alert and oriented to person, place, and time.   Psychiatric:         Mood and Affect: Mood normal.         Behavior: Behavior normal.          PROCEDURE: Cerumen removal under binocular microscopy  INDICATIONS: Cerumen impaction  DESCRIPTION: After verbal consent was obtained and a timeout was performed, the otologic microscope was used to visualize both ears. There were normal appearing auricles bilaterally. There was cerumen impaction bilaterally. Ear cleaning performed under the scope w/ a right angle hook,alligator forceps, and pagan suctions. After cleaning, the ear canal skin was healthy and both TMs were intact w/ no perforations. Both middle ear spaces were clear w/ no effusions. The patient tolerated the procedure well and there were no complications.      ASSESSMENT and PLAN        ICD-10-CM    1. Otalgia, right  H92.01       2. TMJ syndrome  M26.629       3. Bilateral impacted cerumen  H61.23 REMOVE IMPACTED EAR WAX          Patient had considerable right greater than left cerumen impactions carefully debrided under microscopy and following otologic evaluation unremarkable.  Symptomatically he describes right-sided earache radiating in a pattern typical of TMJ syndrome.  He likely has TMJ dysfunction and we have discussed pathophysiology of this and conservative treatment options.  has otalgia which appears secondary to TMJ dysfunction. Both of his ears looked completely healthy on my exam today w/ no other pathology. For now, I recommend conservative measures w/ soft diet, no gum chewing, warm compresses and OTC anti-inflammatories. If conservatives measures fail then they will follow-up with dentist for obtaining a dental guard or to discuss other treatment options.      Hope Bustillo DO  8/12/2024

## 2024-08-14 ENCOUNTER — TELEPHONE (OUTPATIENT)
Dept: ORTHOPEDIC SURGERY | Age: 79
End: 2024-08-14

## 2024-08-14 DIAGNOSIS — M51.36 DDD (DEGENERATIVE DISC DISEASE), LUMBAR: ICD-10-CM

## 2024-08-14 DIAGNOSIS — M54.16 LUMBAR RADICULOPATHY: Primary | ICD-10-CM

## 2024-08-14 DIAGNOSIS — M47.816 SPONDYLOSIS WITHOUT MYELOPATHY OR RADICULOPATHY, LUMBAR REGION: ICD-10-CM

## 2024-08-14 DIAGNOSIS — M48.062 SPINAL STENOSIS OF LUMBAR REGION WITH NEUROGENIC CLAUDICATION: ICD-10-CM

## 2024-08-14 NOTE — TELEPHONE ENCOUNTER
Call returned to patient and states the last injection did not provide much relief. His pain is in the lower back going down the side of his right hip stopping at his calf.

## 2024-08-15 ENCOUNTER — TELEPHONE (OUTPATIENT)
Dept: VASCULAR SURGERY | Age: 79
End: 2024-08-15

## 2024-08-15 NOTE — TELEPHONE ENCOUNTER
Per NP, called and left message on patient's voicemail informing him that his recent CTA Head Neck was reviewed by Dr Rohan Kearney and ANSELMO Lerma.  Scan shows that there is not an aneurysm present.  Patient to keep his scheduled u/s appointment in January.

## 2024-08-22 ENCOUNTER — OFFICE VISIT (OUTPATIENT)
Dept: ORTHOPEDIC SURGERY | Age: 79
End: 2024-08-22

## 2024-08-22 DIAGNOSIS — M47.816 SPONDYLOSIS WITHOUT MYELOPATHY OR RADICULOPATHY, LUMBAR REGION: ICD-10-CM

## 2024-08-22 DIAGNOSIS — M54.16 LUMBAR RADICULOPATHY: Primary | ICD-10-CM

## 2024-08-22 DIAGNOSIS — M48.062 SPINAL STENOSIS OF LUMBAR REGION WITH NEUROGENIC CLAUDICATION: ICD-10-CM

## 2024-08-22 RX ORDER — TRIAMCINOLONE ACETONIDE 40 MG/ML
40 INJECTION, SUSPENSION INTRA-ARTICULAR; INTRAMUSCULAR ONCE
Status: COMPLETED | OUTPATIENT
Start: 2024-08-22 | End: 2024-08-22

## 2024-08-22 RX ADMIN — TRIAMCINOLONE ACETONIDE 40 MG: 40 INJECTION, SUSPENSION INTRA-ARTICULAR; INTRAMUSCULAR at 16:22

## 2024-08-22 NOTE — PROGRESS NOTES
Name: Darwin Bailey  YOB: 1945  Gender: male  MRN: [id number]        Transforaminal RELL Procedure Note    Procedure: Right  L4-L5 transforaminal epidural steroid injections     Precautions: Darwin Bailey denies prior sensitivity to steroid, local anesthetic, iodine, or shellfish.       Consent:  Consent was obtained prior to the procedure. The procedure was discussed at length with Darwin Bailey. He was given the opportunity to ask questions regarding the procedure and its associated risks.  In addition to the potential risks associated with the procedure itself, the patient was informed both verbally and in writing of potential side effects of the use glucocorticoids.  The patient appeared to comprehend the informed consent and desired to have the procedure performed, and informed consent was signed.     He was placed in a prone position on the fluoroscopy table and the skin was prepped and draped in a routine sterile fashion. The areas to be injected were each anesthetized with 1 ml of 1% Lidocaine. A 22 gauge 3.5 inch spinal needle was carefully advanced under fluoroscopic guidance to the right L4-L5 transforaminal space  0.5 ml of 70% of Omnipaque was injected to confirm proper needle placement and absence of subdural or vascular flow Once proper placement was confirmed, 0.5ml of 0.25 marcaine and 40 mg kenalog were injected through the spinal needle.     Fluoroscopic guidance was used intermittently over a 10-minute period to insure proper needle placement and his safety. A hard copy of the fluoroscopic image has been placed in his chart and is saved on the C-arm hard drive. He was monitored for 30 minutes after the procedure and discharged home in a stable fashion with a routine follow up.    Procedural Diagnosis:     ICD-10-CM    1. Lumbar radiculopathy  M54.16 FL NERVE BLOCK LUMBOSACRAL 1ST     triamcinolone acetonide (KENALOG-40) injection 40 mg      2. Spondylosis

## 2024-08-28 ENCOUNTER — TELEPHONE (OUTPATIENT)
Dept: PULMONOLOGY | Age: 79
End: 2024-08-28

## 2024-08-28 DIAGNOSIS — J43.2 CENTRILOBULAR EMPHYSEMA (HCC): Primary | ICD-10-CM

## 2024-08-28 DIAGNOSIS — J44.9 CHRONIC OBSTRUCTIVE PULMONARY DISEASE, UNSPECIFIED COPD TYPE (HCC): ICD-10-CM

## 2024-08-28 NOTE — TELEPHONE ENCOUNTER
Called and spoke with the patient to let him know that Dr Mckeon recommend him to start wearing 2lpm of oxygen with sleep.  Patient is agreeable with this, O2 order sent to [facility] through [employer]. He voices understanding. Veronica COATES

## 2024-08-30 ENCOUNTER — TELEPHONE (OUTPATIENT)
Dept: ORTHOPEDIC SURGERY | Age: 79
End: 2024-08-30

## 2024-09-06 ENCOUNTER — TELEPHONE (OUTPATIENT)
Dept: ORTHOPEDIC SURGERY | Age: 79
End: 2024-09-06

## 2024-09-06 DIAGNOSIS — M54.16 LUMBAR RADICULOPATHY: Primary | ICD-10-CM

## 2024-09-06 DIAGNOSIS — M51.36 DDD (DEGENERATIVE DISC DISEASE), LUMBAR: ICD-10-CM

## 2024-09-06 DIAGNOSIS — M48.062 SPINAL STENOSIS OF LUMBAR REGION WITH NEUROGENIC CLAUDICATION: ICD-10-CM

## 2024-09-09 ENCOUNTER — HOSPITAL ENCOUNTER (OUTPATIENT)
Dept: CARDIAC REHAB | Age: 79
Setting detail: RECURRING SERIES
Discharge: HOME | End: 2024-09-12

## 2024-09-09 RX ORDER — ALPRAZOLAM 1 MG
TABLET ORAL
Qty: 1 TABLET | Refills: 0 | Status: SHIPPED | OUTPATIENT
Start: 2024-09-09 | End: 2024-10-09

## 2024-09-09 ASSESSMENT — PATIENT HEALTH QUESTIONNAIRE - PHQ9
4. FEELING TIRED OR HAVING LITTLE ENERGY: NEARLY EVERY DAY
10. IF YOU CHECKED OFF ANY PROBLEMS, HOW DIFFICULT HAVE THESE PROBLEMS MADE IT FOR YOU TO DO YOUR WORK, TAKE CARE OF THINGS AT HOME, OR GET ALONG WITH OTHER PEOPLE: VERY DIFFICULT
SUM OF ALL RESPONSES TO PHQ QUESTIONS 1-9: 10
2. FEELING DOWN, DEPRESSED OR HOPELESS: SEVERAL DAYS
9. THOUGHTS THAT YOU WOULD BE BETTER OFF DEAD, OR OF HURTING YOURSELF: NOT AT ALL
3. TROUBLE FALLING OR STAYING ASLEEP: SEVERAL DAYS
8. MOVING OR SPEAKING SO SLOWLY THAT OTHER PEOPLE COULD HAVE NOTICED. OR THE OPPOSITE, BEING SO FIGETY OR RESTLESS THAT YOU HAVE BEEN MOVING AROUND A LOT MORE THAN USUAL: NOT AT ALL
SUM OF ALL RESPONSES TO PHQ QUESTIONS 1-9: 10
7. TROUBLE CONCENTRATING ON THINGS, SUCH AS READING THE NEWSPAPER OR WATCHING TELEVISION: NOT AT ALL
5. POOR APPETITE OR OVEREATING: SEVERAL DAYS
SUM OF ALL RESPONSES TO PHQ QUESTIONS 1-9: 10
SUM OF ALL RESPONSES TO PHQ QUESTIONS 1-9: 10
SUM OF ALL RESPONSES TO PHQ9 QUESTIONS 1 & 2: 4
6. FEELING BAD ABOUT YOURSELF - OR THAT YOU ARE A FAILURE OR HAVE LET YOURSELF OR YOUR FAMILY DOWN: SEVERAL DAYS
1. LITTLE INTEREST OR PLEASURE IN DOING THINGS: NEARLY EVERY DAY

## 2024-09-09 ASSESSMENT — LIFESTYLE VARIABLES: SMOKELESS_TOBACCO: NO

## 2024-09-09 ASSESSMENT — PULMONARY FUNCTION TESTS
FVC (LITERS): 3.68
FEV1 (%PREDICTED): 85
FEV1/FVC: 75

## 2024-09-11 ENCOUNTER — OFFICE VISIT (OUTPATIENT)
Age: 79
End: 2024-09-11
Payer: MEDICARE

## 2024-09-11 VITALS
DIASTOLIC BLOOD PRESSURE: 64 MMHG | WEIGHT: 172 LBS | BODY MASS INDEX: 24.68 KG/M2 | HEART RATE: 68 BPM | SYSTOLIC BLOOD PRESSURE: 86 MMHG

## 2024-09-11 DIAGNOSIS — R42 ORTHOSTATIC DIZZINESS: ICD-10-CM

## 2024-09-11 DIAGNOSIS — R06.09 DOE (DYSPNEA ON EXERTION): Primary | ICD-10-CM

## 2024-09-11 DIAGNOSIS — E78.5 DYSLIPIDEMIA: ICD-10-CM

## 2024-09-11 DIAGNOSIS — E78.00 PURE HYPERCHOLESTEROLEMIA: ICD-10-CM

## 2024-09-11 PROCEDURE — 1036F TOBACCO NON-USER: CPT | Performed by: INTERNAL MEDICINE

## 2024-09-11 PROCEDURE — 93000 ELECTROCARDIOGRAM COMPLETE: CPT | Performed by: INTERNAL MEDICINE

## 2024-09-11 PROCEDURE — 1123F ACP DISCUSS/DSCN MKR DOCD: CPT | Performed by: INTERNAL MEDICINE

## 2024-09-11 PROCEDURE — G8420 CALC BMI NORM PARAMETERS: HCPCS | Performed by: INTERNAL MEDICINE

## 2024-09-11 PROCEDURE — 99214 OFFICE O/P EST MOD 30 MIN: CPT | Performed by: INTERNAL MEDICINE

## 2024-09-11 PROCEDURE — G8428 CUR MEDS NOT DOCUMENT: HCPCS | Performed by: INTERNAL MEDICINE

## 2024-09-11 RX ORDER — MIDODRINE HYDROCHLORIDE 2.5 MG/1
2.5 TABLET ORAL 3 TIMES DAILY
Qty: 90 TABLET | Refills: 3 | Status: SHIPPED | OUTPATIENT
Start: 2024-09-11

## 2024-09-17 ENCOUNTER — HOSPITAL ENCOUNTER (OUTPATIENT)
Dept: CARDIAC REHAB | Age: 79
Setting detail: RECURRING SERIES
Discharge: HOME | End: 2024-09-20
Payer: MEDICARE

## 2024-09-17 PROCEDURE — G0239 OTH RESP PROC, GROUP: HCPCS

## 2024-09-17 ASSESSMENT — EXERCISE STRESS TEST
PEAK_BP: 104/68
PEAK_METS: 2.2
PEAK_RPE: 10
PEAK_BP: 104/68
PEAK_METS: 2.2
PEAK_RPE: 10
PEAK_HR: 70

## 2024-09-17 ASSESSMENT — LIFESTYLE VARIABLES: SMOKELESS_TOBACCO: NO

## 2024-09-18 VITALS — WEIGHT: 174.2 LBS | OXYGEN SATURATION: 90 % | BODY MASS INDEX: 25 KG/M2

## 2024-09-19 ENCOUNTER — HOSPITAL ENCOUNTER (OUTPATIENT)
Dept: RADIATION ONCOLOGY | Age: 79
Setting detail: RECURRING SERIES
Discharge: HOME | End: 2024-09-22
Payer: OTHER GOVERNMENT

## 2024-09-19 DIAGNOSIS — C61 PROSTATE CANCER (HCC): ICD-10-CM

## 2024-09-19 LAB — PSA SERPL-MCNC: <0.1 NG/ML (ref 0–4)

## 2024-09-19 PROCEDURE — 84153 ASSAY OF PSA TOTAL: CPT

## 2024-09-19 PROCEDURE — 36415 COLL VENOUS BLD VENIPUNCTURE: CPT

## 2024-09-19 PROCEDURE — 84403 ASSAY OF TOTAL TESTOSTERONE: CPT

## 2024-09-20 LAB — TESTOST SERPL-MCNC: <3 NG/DL (ref 264–916)

## 2024-09-23 ENCOUNTER — HOSPITAL ENCOUNTER (OUTPATIENT)
Dept: CARDIAC REHAB | Age: 79
Setting detail: RECURRING SERIES
Discharge: HOME | End: 2024-09-26
Payer: MEDICARE

## 2024-09-23 VITALS — WEIGHT: 177.2 LBS | BODY MASS INDEX: 25.43 KG/M2

## 2024-09-23 PROCEDURE — G0239 OTH RESP PROC, GROUP: HCPCS

## 2024-09-23 ASSESSMENT — EXERCISE STRESS TEST
PEAK_METS: 2.1
PEAK_HR: 86
PEAK_BP: 124/80

## 2024-09-25 ENCOUNTER — HOSPITAL ENCOUNTER (OUTPATIENT)
Dept: CARDIAC REHAB | Age: 79
Setting detail: RECURRING SERIES
Discharge: HOME | End: 2024-09-28
Payer: MEDICARE

## 2024-09-25 PROCEDURE — G0239 OTH RESP PROC, GROUP: HCPCS

## 2024-09-25 ASSESSMENT — EXERCISE STRESS TEST
PEAK_BP: 124/80
PEAK_METS: 2.1
PEAK_HR: 86

## 2024-09-26 ENCOUNTER — HOSPITAL ENCOUNTER (OUTPATIENT)
Dept: RADIATION ONCOLOGY | Age: 79
Setting detail: RECURRING SERIES
Discharge: HOME | End: 2024-09-29
Payer: OTHER GOVERNMENT

## 2024-09-26 VITALS
BODY MASS INDEX: 25.44 KG/M2 | HEART RATE: 67 BPM | DIASTOLIC BLOOD PRESSURE: 62 MMHG | RESPIRATION RATE: 18 BRPM | WEIGHT: 177.3 LBS | TEMPERATURE: 97.7 F | OXYGEN SATURATION: 97 % | SYSTOLIC BLOOD PRESSURE: 131 MMHG

## 2024-09-26 DIAGNOSIS — C61 PROSTATE CANCER (HCC): Primary | ICD-10-CM

## 2024-09-26 PROCEDURE — 99211 OFF/OP EST MAY X REQ PHY/QHP: CPT

## 2024-09-26 ASSESSMENT — PAIN DESCRIPTION - LOCATION: LOCATION: BACK

## 2024-09-26 ASSESSMENT — PAIN SCALES - GENERAL: PAINLEVEL_OUTOF10: 6

## 2024-09-26 ASSESSMENT — PAIN DESCRIPTION - PAIN TYPE: TYPE: ACUTE PAIN

## 2024-09-26 NOTE — PROGRESS NOTES
Patient: Darwin Bailey MRN: [id number]  SSN: [id number]    YOB: 1945  Age: 78 y.o.  Sex: male      Other Providers:  Dr. Richardson     DIAGNOSIS: Stage IIC (cT1c, cN0, cM0, PSA: 6.8, Grade Group: 4)     PREVIOUS TREATMENT:  12/19/2022: Lupron 45 mg  1/9/2023 - 3/3/2023: 78 Gy in 39 fractions   06/23/2023: Lupron 45 mg  01/26/2024: Lupron 45 mg  07/12/2024: Lupron 45 mg.     HISTORY OF PRESENT ILLNESS:    INTERVAL HISTORY:  Darwin Bailey is a 78 y.o. male w/ a history of high risk prostate cancer sp 78 Santana in 39 fractions with LT-ADT. He is doing reasonably well with minimal residual XRT effects. His PSA on 09/19/24 was less than 0.1 with testosterone still <3.  He had his final Lupron shot in July of this year. He says that he is having a fair amount of fatigue as well as some issues with occasional fainting spells. He has been started on midodrine last week and this seems to have helped. He also endorses weak stream and some increased urgency which is about the same. He has some hot flashes (~3/week - usually just at night now, not bothersome). No issues with memory, mood, or concentration.     AUA at last visit was 11: Today = 17    Pertinent ROS:   ROS: dysuria no; hematuria no.; leakage no; does endorse weak stream and increased frequency improved with flomax    GI ROS: regular no.; pain with bowel movements no.; bleeding no.    Misc ROS: NA; all other review of systems are otherwise negative.      MEDICATIONS:     Current Outpatient Medications:     midodrine (PROAMATINE) 2.5 MG tablet, Take 1 tablet by mouth 3 times daily, Disp: 90 tablet, Rfl: 3    fluticasone-salmeterol (WIXELA INHUB) 250-50 MCG/ACT AEPB diskus inhaler, Inhale 1 puff into the lungs in the morning and 1 puff in the evening., Disp: 3 each, Rfl: 3    tiotropium (SPIRIVA RESPIMAT) 2.5 MCG/ACT AERS inhaler, Inhale 2 puffs into the lungs daily, Disp: 3 each, Rfl: 3    vitamin B-12 (CYANOCOBALAMIN) 1000 MCG tablet, Take

## 2024-09-26 NOTE — PROGRESS NOTES
Patient here for 6 month Follow up Prostate  PSAd and Testosterone  lab complete    Patient is unaccompanied  Patient Vitals as charted  AUA =  17  Patient reports a pain level of  6/10  Pain in back  Patient reports weak stream, some urgency  Patient has had fainting spells  On medication to raise BP  Orders placed for PSAd and Testosterone in 12 months  Follow up with MD after labs  Alternating with Dr. Richardson

## 2024-09-30 ENCOUNTER — HOSPITAL ENCOUNTER (OUTPATIENT)
Dept: CARDIAC REHAB | Age: 79
Setting detail: RECURRING SERIES
End: 2024-09-30
Payer: MEDICARE

## 2024-10-02 ENCOUNTER — HOSPITAL ENCOUNTER (OUTPATIENT)
Dept: CARDIAC REHAB | Age: 79
Setting detail: RECURRING SERIES
End: 2024-10-02
Payer: MEDICARE

## 2024-10-07 ENCOUNTER — HOSPITAL ENCOUNTER (OUTPATIENT)
Dept: CARDIAC REHAB | Age: 79
Setting detail: RECURRING SERIES
Discharge: HOME | End: 2024-10-10
Payer: MEDICARE

## 2024-10-07 VITALS — BODY MASS INDEX: 25.02 KG/M2 | WEIGHT: 174.4 LBS

## 2024-10-07 PROCEDURE — G0239 OTH RESP PROC, GROUP: HCPCS

## 2024-10-07 ASSESSMENT — EXERCISE STRESS TEST
PEAK_BP: 122/78
PEAK_HR: 88
PEAK_METS: 2.3

## 2024-10-09 ENCOUNTER — HOSPITAL ENCOUNTER (OUTPATIENT)
Dept: CARDIAC REHAB | Age: 79
Setting detail: RECURRING SERIES
Discharge: HOME | End: 2024-10-12
Payer: MEDICARE

## 2024-10-09 VITALS — WEIGHT: 174.4 LBS | BODY MASS INDEX: 25.02 KG/M2

## 2024-10-09 PROCEDURE — G0239 OTH RESP PROC, GROUP: HCPCS

## 2024-10-09 ASSESSMENT — EXERCISE STRESS TEST
PEAK_METS: 2.2
PEAK_HR: 84
PEAK_BP: 115/74

## 2024-10-11 DIAGNOSIS — E53.8 B12 DEFICIENCY: ICD-10-CM

## 2024-10-11 DIAGNOSIS — C61 PROSTATE CANCER (HCC): ICD-10-CM

## 2024-10-11 DIAGNOSIS — D47.2 MGUS (MONOCLONAL GAMMOPATHY OF UNKNOWN SIGNIFICANCE): ICD-10-CM

## 2024-10-11 DIAGNOSIS — D64.9 ANEMIA, UNSPECIFIED TYPE: ICD-10-CM

## 2024-10-11 LAB
ALBUMIN SERPL-MCNC: 3.7 G/DL (ref 3.2–4.6)
ALBUMIN/GLOB SERPL: 1 (ref 1–1.9)
ALP SERPL-CCNC: 75 U/L (ref 40–129)
ALT SERPL-CCNC: 23 U/L (ref 8–55)
ANION GAP SERPL CALC-SCNC: 9 MMOL/L (ref 9–18)
AST SERPL-CCNC: 25 U/L (ref 15–37)
BASOPHILS # BLD: 0 K/UL (ref 0–0.2)
BASOPHILS NFR BLD: 1 % (ref 0–2)
BILIRUB SERPL-MCNC: 0.5 MG/DL (ref 0–1.2)
BUN SERPL-MCNC: 14 MG/DL (ref 8–23)
CALCIUM SERPL-MCNC: 9.4 MG/DL (ref 8.8–10.2)
CHLORIDE SERPL-SCNC: 102 MMOL/L (ref 98–107)
CO2 SERPL-SCNC: 25 MMOL/L (ref 20–28)
CREAT SERPL-MCNC: 1.3 MG/DL (ref 0.8–1.3)
DIFFERENTIAL METHOD BLD: ABNORMAL
EOSINOPHIL # BLD: 0 K/UL (ref 0–0.8)
EOSINOPHIL NFR BLD: 1 % (ref 0.5–7.8)
ERYTHROCYTE [DISTWIDTH] IN BLOOD BY AUTOMATED COUNT: 17.3 % (ref 11.9–14.6)
GLOBULIN SER CALC-MCNC: 3.7 G/DL (ref 2.3–3.5)
GLUCOSE SERPL-MCNC: 111 MG/DL (ref 70–99)
HCT VFR BLD AUTO: 34 % (ref 41.1–50.3)
HGB BLD-MCNC: 11 G/DL (ref 13.6–17.2)
HGB RETIC QN AUTO: 37 PG (ref 29–35)
IMM GRANULOCYTES # BLD AUTO: 0 K/UL (ref 0–0.5)
IMM GRANULOCYTES NFR BLD AUTO: 0 % (ref 0–5)
IMM RETICS NFR: 25 % (ref 2.3–13.4)
LYMPHOCYTES # BLD: 1.8 K/UL (ref 0.5–4.6)
LYMPHOCYTES NFR BLD: 46 % (ref 13–44)
MCH RBC QN AUTO: 33.4 PG (ref 26.1–32.9)
MCHC RBC AUTO-ENTMCNC: 32.4 G/DL (ref 31.4–35)
MCV RBC AUTO: 103.3 FL (ref 82–102)
MONOCYTES # BLD: 0.5 K/UL (ref 0.1–1.3)
MONOCYTES NFR BLD: 12 % (ref 4–12)
NEUTS SEG # BLD: 1.6 K/UL (ref 1.7–8.2)
NEUTS SEG NFR BLD: 40 % (ref 43–78)
NRBC # BLD: 0 K/UL (ref 0–0.2)
PLATELET # BLD AUTO: 215 K/UL (ref 150–450)
PMV BLD AUTO: 10.7 FL (ref 9.4–12.3)
POTASSIUM SERPL-SCNC: 4.2 MMOL/L (ref 3.5–5.1)
PROT SERPL-MCNC: 7.3 G/DL (ref 6.3–8.2)
RBC # BLD AUTO: 3.29 M/UL (ref 4.23–5.6)
RETICS # AUTO: 0.07 M/UL (ref 0.03–0.1)
RETICS/RBC NFR AUTO: 2 % (ref 0.3–2)
SODIUM SERPL-SCNC: 137 MMOL/L (ref 136–145)
VIT B12 SERPL-MCNC: 656 PG/ML (ref 193–986)
WBC # BLD AUTO: 3.9 K/UL (ref 4.3–11.1)

## 2024-10-12 LAB
KAPPA LC FREE SER-MCNC: 135 MG/L (ref 2.4–20.7)
KAPPA LC FREE/LAMBDA FREE SER: 6.3 (ref 0.2–0.8)
LAMBDA LC FREE SERPL-MCNC: 21.4 MG/L (ref 4.2–27.7)

## 2024-10-14 ENCOUNTER — CLINICAL DOCUMENTATION (OUTPATIENT)
Dept: ONCOLOGY | Age: 79
End: 2024-10-14

## 2024-10-14 ENCOUNTER — HOSPITAL ENCOUNTER (OUTPATIENT)
Dept: CARDIAC REHAB | Age: 79
Setting detail: RECURRING SERIES
Discharge: HOME | End: 2024-10-17
Payer: MEDICARE

## 2024-10-14 VITALS — WEIGHT: 175 LBS | BODY MASS INDEX: 25.11 KG/M2

## 2024-10-14 PROCEDURE — G0239 OTH RESP PROC, GROUP: HCPCS

## 2024-10-14 ASSESSMENT — EXERCISE STRESS TEST
PEAK_METS: 2.2
PEAK_HR: 93
PEAK_BP: 124/60

## 2024-10-14 NOTE — PROGRESS NOTES
[facility] Form [id number] Request for Services Form completed and faxed to Wm. Arnel Hernandez Shayla [facility] at #[phone] along with the oncology office visit progress note dated 7/12/24.    Proficient Transaction ID [id number]

## 2024-10-15 LAB
ALBUMIN SERPL ELPH-MCNC: 3.7 G/DL (ref 2.9–4.4)
ALBUMIN/GLOB SERPL: 1.1 (ref 0.7–1.7)
ALPHA1 GLOB SERPL ELPH-MCNC: 0.2 G/DL (ref 0–0.4)
ALPHA2 GLOB SERPL ELPH-MCNC: 0.6 G/DL (ref 0.4–1)
B-GLOBULIN SERPL ELPH-MCNC: 1 G/DL (ref 0.7–1.3)
GAMMA GLOB SERPL ELPH-MCNC: 1.6 G/DL (ref 0.4–1.8)
GLOBULIN SER-MCNC: 3.5 G/DL (ref 2.2–3.9)
IGA SERPL-MCNC: 88 MG/DL (ref 61–437)
IGG SERPL-MCNC: 1759 MG/DL (ref 603–1613)
IGM SERPL-MCNC: 68 MG/DL (ref 15–143)
INTERPRETATION SERPL IEP-IMP: ABNORMAL
M PROTEIN SERPL ELPH-MCNC: 1.3 G/DL
PROT SERPL-MCNC: 7.2 G/DL (ref 6–8.5)

## 2024-10-16 ENCOUNTER — HOSPITAL ENCOUNTER (OUTPATIENT)
Dept: CARDIAC REHAB | Age: 79
Setting detail: RECURRING SERIES
Discharge: HOME | End: 2024-10-19
Payer: MEDICARE

## 2024-10-16 PROCEDURE — G0239 OTH RESP PROC, GROUP: HCPCS

## 2024-10-16 ASSESSMENT — EXERCISE STRESS TEST
PEAK_HR: 88
PEAK_METS: 2.3
PEAK_BP: 124/74

## 2024-10-16 ASSESSMENT — LIFESTYLE VARIABLES: SMOKELESS_TOBACCO: NO

## 2024-10-18 ENCOUNTER — OFFICE VISIT (OUTPATIENT)
Dept: ONCOLOGY | Age: 79
End: 2024-10-18
Payer: MEDICARE

## 2024-10-18 VITALS
TEMPERATURE: 98.8 F | HEART RATE: 69 BPM | DIASTOLIC BLOOD PRESSURE: 89 MMHG | SYSTOLIC BLOOD PRESSURE: 164 MMHG | HEIGHT: 70 IN | OXYGEN SATURATION: 100 % | WEIGHT: 179.2 LBS | RESPIRATION RATE: 16 BRPM | BODY MASS INDEX: 25.65 KG/M2

## 2024-10-18 DIAGNOSIS — E53.8 B12 DEFICIENCY: ICD-10-CM

## 2024-10-18 DIAGNOSIS — D47.2 MGUS (MONOCLONAL GAMMOPATHY OF UNKNOWN SIGNIFICANCE): Primary | ICD-10-CM

## 2024-10-18 DIAGNOSIS — C61 PROSTATE CANCER (HCC): ICD-10-CM

## 2024-10-18 PROCEDURE — 1036F TOBACCO NON-USER: CPT | Performed by: INTERNAL MEDICINE

## 2024-10-18 PROCEDURE — G8417 CALC BMI ABV UP PARAM F/U: HCPCS | Performed by: INTERNAL MEDICINE

## 2024-10-18 PROCEDURE — 99214 OFFICE O/P EST MOD 30 MIN: CPT | Performed by: INTERNAL MEDICINE

## 2024-10-18 PROCEDURE — G8428 CUR MEDS NOT DOCUMENT: HCPCS | Performed by: INTERNAL MEDICINE

## 2024-10-18 PROCEDURE — 1123F ACP DISCUSS/DSCN MKR DOCD: CPT | Performed by: INTERNAL MEDICINE

## 2024-10-18 PROCEDURE — G8484 FLU IMMUNIZE NO ADMIN: HCPCS | Performed by: INTERNAL MEDICINE

## 2024-10-18 ASSESSMENT — PATIENT HEALTH QUESTIONNAIRE - PHQ9
1. LITTLE INTEREST OR PLEASURE IN DOING THINGS: NOT AT ALL
SUM OF ALL RESPONSES TO PHQ QUESTIONS 1-9: 0
SUM OF ALL RESPONSES TO PHQ9 QUESTIONS 1 & 2: 0
SUM OF ALL RESPONSES TO PHQ QUESTIONS 1-9: 0
2. FEELING DOWN, DEPRESSED OR HOPELESS: NOT AT ALL
SUM OF ALL RESPONSES TO PHQ QUESTIONS 1-9: 0
SUM OF ALL RESPONSES TO PHQ QUESTIONS 1-9: 0

## 2024-10-18 NOTE — PROGRESS NOTES
[facility]    Chief Complaint:    Chief Complaint   Patient presents with    Follow-up         History of Present Illness:  Mr. Bailey is a 78 y.o. male who presents today for follow-up regarding MGUS and anemia.  He is a patient of Dr. Richardson and Dr. Mota for localized prostate cancer, s/p EBRT with ongoing ADT as well.  His Hgb was noted to be down to 12.0 and he had an [facility], his M-spike was 1.1 with normal calcium, creatinine of 1.4 and Hgb of 12.  He was referred to hematology for recommendations, because his renal function and Hgb were not low enough to meet MM criteria, we recommended observation with repeat paraprotein studies in 3 months.  Repeat M-spike was 1.0 with IgG of 1729 and kappa FLC of 135.  However, his B12 level was low at 189, which may account for his slight fatigue and his anemia, especially since his MCV is elevated.  We will start monthly B12 replacement and recheck his labs in 3 months.  I suspect the fatigue he is experiencing is more from the ADT or the B12 deficiency rather than anything to do with his MGUS.  Because his renal function and Hgb were not low enough to meet MM criteria, we recommended observation with repeat paraprotein studies in 3 months.      He returns today for follow-up.  He continues to follow with urology and rad onc for his history of prostate cancer, final Lupron was given in July.  He has been compliant with oral B12, no clinical change on this.      Review of Systems:  Constitutional: Negative.   HENT: Negative.   Eyes: Negative.   Respiratory: Negative.   Cardiovascular: Negative.   Gastrointestinal: Negative.   Genitourinary: Negative.   Musculoskeletal: Negative.   Skin: Negative.   Neurological: Negative.   Endo/Heme/Allergies: Negative.   Psychiatric/Behavioral: Negative.   All other systems reviewed and are negative.     Allergies   Allergen

## 2024-10-18 NOTE — PATIENT INSTRUCTIONS
test to the current  sample.      KAPPA FREE LIGHT CHAIN 10/11/2024 135.0 (H)  2.4 - 20.7 mg/L Final    LAMBDA FREE LIGHT CHAIN 10/11/2024 21.4  4.2 - 27.7 mg/L Final    Kappa/Lambda Fluid C Ratio 10/11/2024 6.3 (H)  0.2 - 0.8   Final    Sodium 10/11/2024 137  136 - 145 mmol/L Final    Potassium 10/11/2024 4.2  3.5 - 5.1 mmol/L Final    Chloride 10/11/2024 102  98 - 107 mmol/L Final    CO2 10/11/2024 25  20 - 28 mmol/L Final    Anion Gap 10/11/2024 9  9 - 18 mmol/L Final    Glucose 10/11/2024 111 (H)  70 - 99 mg/dL Final    Comment: <70 mg/dL Consistent with, but not fully diagnostic of hypoglycemia.  100 - 125 mg/dL Impaired fasting glucose/consistent with pre-diabetes mellitus.  > 126 mg/dl Fasting glucose consistent with overt diabetes mellitus      BUN 10/11/2024 14  8 - 23 MG/DL Final    Creatinine 10/11/2024 1.30  0.80 - 1.30 MG/DL Final    Est, Glom Filt Rate 10/11/2024 56 (L)  >60 ml/min/1.73m2 Final    Comment:   Pediatric calculator link: https://www.kidney.org/professionals/kdoqi/gfr_calculatorped    These results are not intended for use in patients <18 years of age.    eGFR results are calculated without a race factor using  the 2021 CKD-EPI equation. Careful clinical correlation is recommended, particularly when comparing to results calculated using previous equations.  The CKD-EPI equation is less accurate in patients with extremes of muscle mass, extra-renal metabolism of creatinine, excessive creatine ingestion, or following therapy that affects renal tubular secretion.      Calcium 10/11/2024 9.4  8.8 - 10.2 MG/DL Final    Total Bilirubin 10/11/2024 0.5  0.0 - 1.2 MG/DL Final    ALT 10/11/2024 23  8 - 55 U/L Final    AST 10/11/2024 25  15 - 37 U/L Final    Alk Phosphatase 10/11/2024 75  40 - 129 U/L Final    Total Protein 10/11/2024 7.3  6.3 - 8.2 g/dL Final    Albumin 10/11/2024 3.7  3.2 - 4.6 g/dL Final    Globulin 10/11/2024 3.7 (H)  2.3 - 3.5 g/dL Final    Albumin/Globulin Ratio 10/11/2024 1.0

## 2024-10-21 ENCOUNTER — HOSPITAL ENCOUNTER (OUTPATIENT)
Dept: CARDIAC REHAB | Age: 79
Setting detail: RECURRING SERIES
End: 2024-10-21
Payer: MEDICARE

## 2024-10-23 ENCOUNTER — HOSPITAL ENCOUNTER (OUTPATIENT)
Dept: CARDIAC REHAB | Age: 79
Setting detail: RECURRING SERIES
Discharge: HOME | End: 2024-10-26
Payer: MEDICARE

## 2024-10-23 VITALS — BODY MASS INDEX: 25.43 KG/M2 | WEIGHT: 177.2 LBS

## 2024-10-23 PROCEDURE — G0239 OTH RESP PROC, GROUP: HCPCS

## 2024-10-23 ASSESSMENT — EXERCISE STRESS TEST
PEAK_HR: 88
PEAK_METS: 2.4
PEAK_BP: 124/80

## 2024-10-28 ENCOUNTER — HOSPITAL ENCOUNTER (OUTPATIENT)
Dept: CARDIAC REHAB | Age: 79
Setting detail: RECURRING SERIES
Discharge: HOME | End: 2024-10-31
Payer: MEDICARE

## 2024-10-28 PROCEDURE — G0239 OTH RESP PROC, GROUP: HCPCS

## 2024-10-28 ASSESSMENT — EXERCISE STRESS TEST
PEAK_HR: 92
PEAK_METS: 2.4
PEAK_BP: 118/70

## 2024-10-30 ENCOUNTER — HOSPITAL ENCOUNTER (OUTPATIENT)
Dept: CARDIAC REHAB | Age: 79
Setting detail: RECURRING SERIES
End: 2024-10-30
Payer: MEDICARE

## 2024-11-04 ENCOUNTER — HOSPITAL ENCOUNTER (OUTPATIENT)
Dept: CARDIAC REHAB | Age: 79
Setting detail: RECURRING SERIES
Discharge: HOME | End: 2024-11-07
Payer: MEDICARE

## 2024-11-04 PROCEDURE — G0239 OTH RESP PROC, GROUP: HCPCS

## 2024-11-04 ASSESSMENT — EXERCISE STRESS TEST
PEAK_METS: 2.4
PEAK_BP: 136/78
PEAK_HR: 91

## 2024-11-06 ENCOUNTER — HOSPITAL ENCOUNTER (OUTPATIENT)
Dept: CARDIAC REHAB | Age: 79
Setting detail: RECURRING SERIES
Discharge: HOME | End: 2024-11-09
Payer: MEDICARE

## 2024-11-06 PROCEDURE — G0239 OTH RESP PROC, GROUP: HCPCS

## 2024-11-06 ASSESSMENT — EXERCISE STRESS TEST
PEAK_HR: 77
PEAK_METS: 2.1
PEAK_BP: 104/70

## 2024-11-11 ENCOUNTER — HOSPITAL ENCOUNTER (OUTPATIENT)
Dept: CARDIAC REHAB | Age: 79
Setting detail: RECURRING SERIES
Discharge: HOME | End: 2024-11-14
Payer: MEDICARE

## 2024-11-11 VITALS — BODY MASS INDEX: 25.77 KG/M2 | WEIGHT: 179.6 LBS

## 2024-11-11 PROCEDURE — G0239 OTH RESP PROC, GROUP: HCPCS

## 2024-11-11 ASSESSMENT — EXERCISE STRESS TEST
PEAK_HR: 102
PEAK_METS: 2.4
PEAK_BP: 132/66

## 2024-11-12 ASSESSMENT — PULMONARY FUNCTION TESTS
FEV1/FVC: 75
FEV1 (%PREDICTED): 85
FVC (LITERS): 3.68

## 2024-11-12 ASSESSMENT — LIFESTYLE VARIABLES: SMOKELESS_TOBACCO: NO

## 2024-11-13 ENCOUNTER — HOSPITAL ENCOUNTER (OUTPATIENT)
Dept: CARDIAC REHAB | Age: 79
Setting detail: RECURRING SERIES
Discharge: HOME | End: 2024-11-16
Payer: MEDICARE

## 2024-11-13 PROCEDURE — G0239 OTH RESP PROC, GROUP: HCPCS

## 2024-11-13 ASSESSMENT — EXERCISE STRESS TEST
PEAK_HR: 100
PEAK_RPE: 11
PEAK_BP: 130/70
PEAK_METS: 2.3

## 2024-11-18 ENCOUNTER — HOSPITAL ENCOUNTER (OUTPATIENT)
Dept: CARDIAC REHAB | Age: 79
Setting detail: RECURRING SERIES
Discharge: HOME | End: 2024-11-21
Payer: MEDICARE

## 2024-11-18 VITALS — WEIGHT: 179.2 LBS | BODY MASS INDEX: 25.71 KG/M2

## 2024-11-18 PROCEDURE — G0239 OTH RESP PROC, GROUP: HCPCS

## 2024-11-18 ASSESSMENT — EXERCISE STRESS TEST
PEAK_METS: 2.3
PEAK_HR: 85
PEAK_BP: 114/62

## 2024-11-20 ENCOUNTER — HOSPITAL ENCOUNTER (OUTPATIENT)
Dept: CARDIAC REHAB | Age: 79
Setting detail: RECURRING SERIES
Discharge: HOME | End: 2024-11-23
Payer: MEDICARE

## 2024-11-20 PROCEDURE — G0239 OTH RESP PROC, GROUP: HCPCS

## 2024-11-20 ASSESSMENT — EXERCISE STRESS TEST
PEAK_BP: 120/70
PEAK_HR: 88
PEAK_METS: 2.4

## 2024-11-25 ENCOUNTER — HOSPITAL ENCOUNTER (OUTPATIENT)
Dept: CARDIAC REHAB | Age: 79
Setting detail: RECURRING SERIES
Discharge: HOME | End: 2024-11-28
Payer: MEDICARE

## 2024-11-25 ENCOUNTER — OFFICE VISIT (OUTPATIENT)
Age: 79
End: 2024-11-25
Payer: MEDICARE

## 2024-11-25 VITALS
BODY MASS INDEX: 25.91 KG/M2 | SYSTOLIC BLOOD PRESSURE: 128 MMHG | DIASTOLIC BLOOD PRESSURE: 70 MMHG | WEIGHT: 181 LBS | HEART RATE: 62 BPM | HEIGHT: 70 IN

## 2024-11-25 VITALS — WEIGHT: 179.2 LBS | BODY MASS INDEX: 25.71 KG/M2

## 2024-11-25 DIAGNOSIS — R42 ORTHOSTATIC DIZZINESS: ICD-10-CM

## 2024-11-25 DIAGNOSIS — E78.5 DYSLIPIDEMIA: Primary | ICD-10-CM

## 2024-11-25 DIAGNOSIS — E78.00 PURE HYPERCHOLESTEROLEMIA: ICD-10-CM

## 2024-11-25 DIAGNOSIS — R06.02 SHORTNESS OF BREATH: ICD-10-CM

## 2024-11-25 PROCEDURE — 1126F AMNT PAIN NOTED NONE PRSNT: CPT | Performed by: INTERNAL MEDICINE

## 2024-11-25 PROCEDURE — G0239 OTH RESP PROC, GROUP: HCPCS

## 2024-11-25 PROCEDURE — G8484 FLU IMMUNIZE NO ADMIN: HCPCS | Performed by: INTERNAL MEDICINE

## 2024-11-25 PROCEDURE — 1123F ACP DISCUSS/DSCN MKR DOCD: CPT | Performed by: INTERNAL MEDICINE

## 2024-11-25 PROCEDURE — 99214 OFFICE O/P EST MOD 30 MIN: CPT | Performed by: INTERNAL MEDICINE

## 2024-11-25 PROCEDURE — G8417 CALC BMI ABV UP PARAM F/U: HCPCS | Performed by: INTERNAL MEDICINE

## 2024-11-25 PROCEDURE — G8428 CUR MEDS NOT DOCUMENT: HCPCS | Performed by: INTERNAL MEDICINE

## 2024-11-25 PROCEDURE — 1036F TOBACCO NON-USER: CPT | Performed by: INTERNAL MEDICINE

## 2024-11-25 ASSESSMENT — EXERCISE STRESS TEST
PEAK_HR: 88
PEAK_METS: 2.4

## 2024-11-25 NOTE — PROGRESS NOTES
[facility] CARDIOLOGY  [address] [place] [address], [place] [address]  PHONE: [phone]      24    NAME:  Darwin Bailey  : 1945  MRN: [id number]       SUBJECTIVE:   Darwin Bailey is a 79 y.o. male seen for a follow up visit regarding the following:     Chief Complaint   Patient presents with    Results     echo         HPI:    No cp or jack. No orthopnea or pnd. No palpitations or syncope.  Dizziness has resolved since starting midodrine- mild improvement of jack with pulm rehab    Past Medical History, Past Surgical History, Family history, Social History, and Medications were all reviewed with the patient today and updated as necessary.     Current Outpatient Medications   Medication Sig Dispense Refill    midodrine (PROAMATINE) 2.5 MG tablet Take 1 tablet by mouth 3 times daily 90 tablet 3    fluticasone-salmeterol (WIXELA INHUB) 250-50 MCG/ACT AEPB diskus inhaler Inhale 1 puff into the lungs in the morning and 1 puff in the evening. 3 each 3    tiotropium (SPIRIVA RESPIMAT) 2.5 MCG/ACT AERS inhaler Inhale 2 puffs into the lungs daily 3 each 3    vitamin B-12 (CYANOCOBALAMIN) 1000 MCG tablet Take 1 tablet by mouth daily      calcium carbonate (OSCAL) 500 MG TABS tablet Take 1 tablet by mouth daily      Phenylephrine-APAP-guaiFENesin (TYLENOL SINUS SEVERE PO) Take by mouth daily as needed      albuterol sulfate HFA (PROAIR HFA) 108 (90 Base) MCG/ACT inhaler Inhale 2 puffs into the lungs every 6 hours as needed for Wheezing 3 each 3    aspirin 81 MG EC tablet Take 1 tablet by mouth daily      Cholecalciferol (VITAMIN D-3) 25 MCG (1000 UT) CAPS Take 1 capsule by mouth daily      acetaminophen (TYLENOL) 500 MG tablet Take 2 tablets by mouth every 4 hours as needed      atorvastatin (LIPITOR) 80 MG tablet Take 1 tablet by mouth daily      famotidine (PEPCID) 20 MG tablet Take 2 tablets by mouth daily      finasteride (PROSCAR) 5 MG tablet Take 1 tablet by mouth daily      tamsulosin 
No

## 2024-11-27 ENCOUNTER — HOSPITAL ENCOUNTER (OUTPATIENT)
Dept: CARDIAC REHAB | Age: 79
Setting detail: RECURRING SERIES
End: 2024-11-27
Payer: MEDICARE

## 2024-12-02 ENCOUNTER — HOSPITAL ENCOUNTER (OUTPATIENT)
Dept: CARDIAC REHAB | Age: 79
Setting detail: RECURRING SERIES
Discharge: HOME | End: 2024-12-05
Payer: MEDICARE

## 2024-12-02 VITALS — WEIGHT: 179.2 LBS | BODY MASS INDEX: 25.71 KG/M2

## 2024-12-02 PROCEDURE — G0239 OTH RESP PROC, GROUP: HCPCS

## 2024-12-02 ASSESSMENT — EXERCISE STRESS TEST
PEAK_HR: 92
PEAK_METS: 2.4
PEAK_BP: 144/76

## 2024-12-04 ENCOUNTER — HOSPITAL ENCOUNTER (OUTPATIENT)
Dept: CARDIAC REHAB | Age: 79
Setting detail: RECURRING SERIES
Discharge: HOME | End: 2024-12-07
Payer: MEDICARE

## 2024-12-04 PROCEDURE — G0239 OTH RESP PROC, GROUP: HCPCS

## 2024-12-04 ASSESSMENT — EXERCISE STRESS TEST
PEAK_METS: 2.5
PEAK_HR: 96
PEAK_BP: 124/80

## 2024-12-09 ENCOUNTER — HOSPITAL ENCOUNTER (OUTPATIENT)
Dept: CARDIAC REHAB | Age: 79
Setting detail: RECURRING SERIES
End: 2024-12-09
Payer: MEDICARE

## 2024-12-09 ASSESSMENT — PULMONARY FUNCTION TESTS
FEV1 (%PREDICTED): 85
FVC (LITERS): 3.68
FEV1/FVC: 75

## 2024-12-09 ASSESSMENT — LIFESTYLE VARIABLES: SMOKELESS_TOBACCO: NO

## 2024-12-11 ENCOUNTER — HOSPITAL ENCOUNTER (OUTPATIENT)
Dept: CARDIAC REHAB | Age: 79
Setting detail: RECURRING SERIES
End: 2024-12-11
Payer: MEDICARE

## 2024-12-16 ENCOUNTER — HOSPITAL ENCOUNTER (OUTPATIENT)
Dept: CARDIAC REHAB | Age: 79
Setting detail: RECURRING SERIES
End: 2024-12-16
Payer: MEDICARE

## 2024-12-18 ENCOUNTER — HOSPITAL ENCOUNTER (OUTPATIENT)
Dept: CARDIAC REHAB | Age: 79
Setting detail: RECURRING SERIES
End: 2024-12-18
Payer: MEDICARE

## 2024-12-23 ENCOUNTER — HOSPITAL ENCOUNTER (OUTPATIENT)
Dept: CARDIAC REHAB | Age: 79
Setting detail: RECURRING SERIES
End: 2024-12-23
Payer: MEDICARE

## 2024-12-27 ENCOUNTER — TELEPHONE (OUTPATIENT)
Dept: PULMONOLOGY | Age: 79
End: 2024-12-27

## 2024-12-27 NOTE — TELEPHONE ENCOUNTER
Called patient to schedule next follow up appointment per recall, patient stated that he was out of town and it wasn't a good time to schedule and he will call back. Sending letter for a reminder to call to schedule

## 2024-12-30 ENCOUNTER — HOSPITAL ENCOUNTER (OUTPATIENT)
Dept: CARDIAC REHAB | Age: 79
Setting detail: RECURRING SERIES
End: 2024-12-30
Payer: MEDICARE

## 2025-01-06 ENCOUNTER — HOSPITAL ENCOUNTER (OUTPATIENT)
Dept: CARDIAC REHAB | Age: 80
Setting detail: RECURRING SERIES
Discharge: HOME | End: 2025-01-09
Payer: MEDICARE

## 2025-01-06 VITALS — WEIGHT: 180 LBS | BODY MASS INDEX: 25.83 KG/M2

## 2025-01-06 PROCEDURE — G0239 OTH RESP PROC, GROUP: HCPCS

## 2025-01-06 ASSESSMENT — EXERCISE STRESS TEST
PEAK_BP: 120/68
PEAK_METS: 2.5
PEAK_HR: 82

## 2025-01-08 ENCOUNTER — HOSPITAL ENCOUNTER (OUTPATIENT)
Dept: CARDIAC REHAB | Age: 80
Setting detail: RECURRING SERIES
Discharge: HOME | End: 2025-01-11
Payer: MEDICARE

## 2025-01-08 PROCEDURE — G0239 OTH RESP PROC, GROUP: HCPCS

## 2025-01-08 ASSESSMENT — LIFESTYLE VARIABLES: SMOKELESS_TOBACCO: NO

## 2025-01-08 ASSESSMENT — PULMONARY FUNCTION TESTS
FVC (LITERS): 3.68
FEV1/FVC: 75
FEV1 (%PREDICTED): 85

## 2025-01-08 ASSESSMENT — EXERCISE STRESS TEST
PEAK_METS: 2.6
PEAK_RPE: 7
PEAK_HR: 80
PEAK_BP: 118/60

## 2025-01-13 ENCOUNTER — HOSPITAL ENCOUNTER (OUTPATIENT)
Dept: CARDIAC REHAB | Age: 80
Setting detail: RECURRING SERIES
Discharge: HOME | End: 2025-01-16
Payer: MEDICARE

## 2025-01-13 VITALS — WEIGHT: 181.4 LBS | BODY MASS INDEX: 26.03 KG/M2

## 2025-01-13 PROCEDURE — G0239 OTH RESP PROC, GROUP: HCPCS

## 2025-01-13 ASSESSMENT — EXERCISE STRESS TEST
PEAK_METS: 2.4
PEAK_HR: 92
PEAK_BP: 124/70

## 2025-01-15 ENCOUNTER — HOSPITAL ENCOUNTER (OUTPATIENT)
Dept: CARDIAC REHAB | Age: 80
Setting detail: RECURRING SERIES
Discharge: HOME | End: 2025-01-18
Payer: MEDICARE

## 2025-01-15 PROCEDURE — 94618 PULMONARY STRESS TESTING: CPT

## 2025-01-15 PROCEDURE — G0239 OTH RESP PROC, GROUP: HCPCS

## 2025-01-15 ASSESSMENT — PATIENT HEALTH QUESTIONNAIRE - PHQ9
2. FEELING DOWN, DEPRESSED OR HOPELESS: SEVERAL DAYS
SUM OF ALL RESPONSES TO PHQ QUESTIONS 1-9: 11
8. MOVING OR SPEAKING SO SLOWLY THAT OTHER PEOPLE COULD HAVE NOTICED. OR THE OPPOSITE, BEING SO FIGETY OR RESTLESS THAT YOU HAVE BEEN MOVING AROUND A LOT MORE THAN USUAL: SEVERAL DAYS
3. TROUBLE FALLING OR STAYING ASLEEP: MORE THAN HALF THE DAYS
SUM OF ALL RESPONSES TO PHQ QUESTIONS 1-9: 11
SUM OF ALL RESPONSES TO PHQ QUESTIONS 1-9: 11
10. IF YOU CHECKED OFF ANY PROBLEMS, HOW DIFFICULT HAVE THESE PROBLEMS MADE IT FOR YOU TO DO YOUR WORK, TAKE CARE OF THINGS AT HOME, OR GET ALONG WITH OTHER PEOPLE: NOT DIFFICULT AT ALL
SUM OF ALL RESPONSES TO PHQ QUESTIONS 1-9: 11
4. FEELING TIRED OR HAVING LITTLE ENERGY: MORE THAN HALF THE DAYS
9. THOUGHTS THAT YOU WOULD BE BETTER OFF DEAD, OR OF HURTING YOURSELF: NOT AT ALL
5. POOR APPETITE OR OVEREATING: MORE THAN HALF THE DAYS
SUM OF ALL RESPONSES TO PHQ9 QUESTIONS 1 & 2: 2
6. FEELING BAD ABOUT YOURSELF - OR THAT YOU ARE A FAILURE OR HAVE LET YOURSELF OR YOUR FAMILY DOWN: MORE THAN HALF THE DAYS
1. LITTLE INTEREST OR PLEASURE IN DOING THINGS: SEVERAL DAYS

## 2025-01-15 ASSESSMENT — EXERCISE STRESS TEST
PEAK_HR: 94
PEAK_BP: 126/76
PEAK_METS: 2.5

## 2025-01-16 ASSESSMENT — PATIENT HEALTH QUESTIONNAIRE - PHQ9
SUM OF ALL RESPONSES TO PHQ QUESTIONS 1-9: 11
1. LITTLE INTEREST OR PLEASURE IN DOING THINGS: SEVERAL DAYS
SUM OF ALL RESPONSES TO PHQ QUESTIONS 1-9: 11
9. THOUGHTS THAT YOU WOULD BE BETTER OFF DEAD, OR OF HURTING YOURSELF: NOT AT ALL
SUM OF ALL RESPONSES TO PHQ QUESTIONS 1-9: 11
5. POOR APPETITE OR OVEREATING: MORE THAN HALF THE DAYS
6. FEELING BAD ABOUT YOURSELF - OR THAT YOU ARE A FAILURE OR HAVE LET YOURSELF OR YOUR FAMILY DOWN: MORE THAN HALF THE DAYS
7. TROUBLE CONCENTRATING ON THINGS, SUCH AS READING THE NEWSPAPER OR WATCHING TELEVISION: SEVERAL DAYS
SUM OF ALL RESPONSES TO PHQ QUESTIONS 1-9: 11
8. MOVING OR SPEAKING SO SLOWLY THAT OTHER PEOPLE COULD HAVE NOTICED. OR THE OPPOSITE, BEING SO FIGETY OR RESTLESS THAT YOU HAVE BEEN MOVING AROUND A LOT MORE THAN USUAL: NOT AT ALL
4. FEELING TIRED OR HAVING LITTLE ENERGY: MORE THAN HALF THE DAYS
2. FEELING DOWN, DEPRESSED OR HOPELESS: SEVERAL DAYS
SUM OF ALL RESPONSES TO PHQ9 QUESTIONS 1 & 2: 2
3. TROUBLE FALLING OR STAYING ASLEEP: MORE THAN HALF THE DAYS

## 2025-01-20 ENCOUNTER — HOSPITAL ENCOUNTER (OUTPATIENT)
Dept: CARDIAC REHAB | Age: 80
Setting detail: RECURRING SERIES
Discharge: HOME | End: 2025-01-23
Payer: MEDICARE

## 2025-01-20 VITALS — WEIGHT: 182.4 LBS | BODY MASS INDEX: 26.17 KG/M2

## 2025-01-20 PROCEDURE — G0239 OTH RESP PROC, GROUP: HCPCS

## 2025-01-20 ASSESSMENT — EXERCISE STRESS TEST
PEAK_BP: 139/86
PEAK_METS: 2.5
PEAK_HR: 71

## 2025-01-22 ENCOUNTER — HOSPITAL ENCOUNTER (OUTPATIENT)
Dept: CARDIAC REHAB | Age: 80
Setting detail: RECURRING SERIES
End: 2025-01-22
Payer: MEDICARE

## 2025-01-24 ENCOUNTER — OFFICE VISIT (OUTPATIENT)
Dept: INTERNAL MEDICINE CLINIC | Facility: CLINIC | Age: 80
End: 2025-01-24

## 2025-01-24 VITALS
BODY MASS INDEX: 26.2 KG/M2 | HEIGHT: 70 IN | DIASTOLIC BLOOD PRESSURE: 90 MMHG | SYSTOLIC BLOOD PRESSURE: 164 MMHG | WEIGHT: 183 LBS

## 2025-01-24 DIAGNOSIS — Z85.820 HISTORY OF MELANOMA: ICD-10-CM

## 2025-01-24 DIAGNOSIS — N18.32 STAGE 3B CHRONIC KIDNEY DISEASE (HCC): ICD-10-CM

## 2025-01-24 DIAGNOSIS — M54.6 ACUTE BILATERAL THORACIC BACK PAIN: Primary | ICD-10-CM

## 2025-01-24 DIAGNOSIS — C61 PROSTATE CANCER (HCC): ICD-10-CM

## 2025-01-24 RX ORDER — PREDNISONE 5 MG/1
TABLET ORAL
Qty: 1 EACH | Refills: 0 | Status: SHIPPED | OUTPATIENT
Start: 2025-01-24

## 2025-01-24 SDOH — ECONOMIC STABILITY: FOOD INSECURITY: WITHIN THE PAST 12 MONTHS, THE FOOD YOU BOUGHT JUST DIDN'T LAST AND YOU DIDN'T HAVE MONEY TO GET MORE.: NEVER TRUE

## 2025-01-24 SDOH — ECONOMIC STABILITY: FOOD INSECURITY: WITHIN THE PAST 12 MONTHS, YOU WORRIED THAT YOUR FOOD WOULD RUN OUT BEFORE YOU GOT MONEY TO BUY MORE.: NEVER TRUE

## 2025-01-24 ASSESSMENT — ENCOUNTER SYMPTOMS
COUGH: 0
BACK PAIN: 1

## 2025-01-24 NOTE — PROGRESS NOTES
PROGRESS NOTE      Chief Complaint   Patient presents with    Back Pain     Mid back pain for the last week or so and has gotten worse. Worse with certain movements. Heat helps, tylenol and ibuprofen help as well.       HPI    Mid back pain: 6-8 weeks with increased severity over past few days. No definite precipitating event or injury though had started some weights at pulmonary rehab. Thoracic. Bilateral. Pain with movement. No fever or chills. Some night time symptoms. Improved when recumbent. Tylenol and ibuprofen with limited benefit. History of prostate cancer (Dr Richardson - annual follow up), melanoma(recent derm visit), MGUS.(Dr Hook - annual follow up        Past Medical History, Past Surgical History, Family history, Social History, and Medications were all reviewed and updated as necessary.     Current Outpatient Medications   Medication Sig Dispense Refill    predniSONE 5 MG (21) TBPK As directed 1 each 0    tiZANidine (ZANAFLEX) 4 MG tablet Take 1 tablet by mouth every 12 hours as needed (muscle spasm) 20 tablet 0    midodrine (PROAMATINE) 2.5 MG tablet Take 1 tablet by mouth 3 times daily 90 tablet 3    fluticasone-salmeterol (WIXELA INHUB) 250-50 MCG/ACT AEPB diskus inhaler Inhale 1 puff into the lungs in the morning and 1 puff in the evening. 3 each 3    tiotropium (SPIRIVA RESPIMAT) 2.5 MCG/ACT AERS inhaler Inhale 2 puffs into the lungs daily 3 each 3    vitamin B-12 (CYANOCOBALAMIN) 1000 MCG tablet Take 1 tablet by mouth daily      calcium carbonate (OSCAL) 500 MG TABS tablet Take 1 tablet by mouth daily      Phenylephrine-APAP-guaiFENesin (TYLENOL SINUS SEVERE PO) Take by mouth daily as needed      albuterol sulfate HFA (PROAIR HFA) 108 (90 Base) MCG/ACT inhaler Inhale 2 puffs into the lungs every 6 hours as needed for Wheezing 3 each 3    aspirin 81 MG EC tablet Take 1 tablet by mouth daily      Cholecalciferol (VITAMIN D-3) 25 MCG (1000 UT) CAPS Take 1 capsule by mouth daily      acetaminophen

## 2025-01-27 ENCOUNTER — HOSPITAL ENCOUNTER (OUTPATIENT)
Dept: CARDIAC REHAB | Age: 80
Setting detail: RECURRING SERIES
End: 2025-01-27
Payer: MEDICARE

## 2025-01-29 ENCOUNTER — OFFICE VISIT (OUTPATIENT)
Dept: VASCULAR SURGERY | Age: 80
End: 2025-01-29

## 2025-01-29 VITALS
SYSTOLIC BLOOD PRESSURE: 102 MMHG | OXYGEN SATURATION: 95 % | DIASTOLIC BLOOD PRESSURE: 72 MMHG | HEART RATE: 68 BPM | BODY MASS INDEX: 25.77 KG/M2 | HEIGHT: 70 IN | WEIGHT: 180 LBS

## 2025-01-29 DIAGNOSIS — I71.43 INFRARENAL ABDOMINAL AORTIC ANEURYSM (AAA) WITHOUT RUPTURE (HCC): Primary | ICD-10-CM
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
## 2025-02-01 ENCOUNTER — HOSPITAL ENCOUNTER (EMERGENCY)
Age: 80
Discharge: HOME | End: 2025-02-01
Payer: OTHER GOVERNMENT

## 2025-02-01 ENCOUNTER — APPOINTMENT (OUTPATIENT)
Dept: CT IMAGING | Age: 80
End: 2025-02-01
Payer: OTHER GOVERNMENT

## 2025-02-01 VITALS
HEART RATE: 65 BPM | HEIGHT: 71 IN | DIASTOLIC BLOOD PRESSURE: 82 MMHG | WEIGHT: 170 LBS | RESPIRATION RATE: 18 BRPM | SYSTOLIC BLOOD PRESSURE: 144 MMHG | BODY MASS INDEX: 23.8 KG/M2 | TEMPERATURE: 99.1 F | OXYGEN SATURATION: 96 %

## 2025-02-01 DIAGNOSIS — R51.9 ACUTE NONINTRACTABLE HEADACHE, UNSPECIFIED HEADACHE TYPE: Primary | ICD-10-CM

## 2025-02-01 DIAGNOSIS — I77.4 CELIAC ARTERY STENOSIS (HCC): ICD-10-CM

## 2025-02-01 DIAGNOSIS — M54.6 ACUTE BILATERAL THORACIC BACK PAIN: ICD-10-CM

## 2025-02-01 LAB
ALBUMIN SERPL-MCNC: 4 G/DL (ref 3.2–4.6)
ALBUMIN/GLOB SERPL: 1.1 (ref 1–1.9)
ALP SERPL-CCNC: 75 U/L (ref 40–129)
ALT SERPL-CCNC: 24 U/L (ref 8–55)
ANION GAP SERPL CALC-SCNC: 16 MMOL/L (ref 7–16)
AST SERPL-CCNC: 31 U/L (ref 15–37)
BASOPHILS # BLD: 0.01 K/UL (ref 0–0.2)
BASOPHILS NFR BLD: 0.3 % (ref 0–2)
BILIRUB SERPL-MCNC: 0.7 MG/DL (ref 0–1.2)
BUN SERPL-MCNC: 18 MG/DL (ref 8–23)
CALCIUM SERPL-MCNC: 9.4 MG/DL (ref 8.8–10.2)
CHLORIDE SERPL-SCNC: 98 MMOL/L (ref 98–107)
CO2 SERPL-SCNC: 22 MMOL/L (ref 20–29)
CREAT SERPL-MCNC: 1.45 MG/DL (ref 0.8–1.3)
DIFFERENTIAL METHOD BLD: ABNORMAL
EKG ATRIAL RATE: 89 BPM
EKG DIAGNOSIS: NORMAL
EKG P AXIS: 62 DEGREES
EKG P-R INTERVAL: 183 MS
EKG Q-T INTERVAL: 350 MS
EKG QRS DURATION: 103 MS
EKG QTC CALCULATION (BAZETT): 426 MS
EKG R AXIS: -28 DEGREES
EKG T AXIS: 80 DEGREES
EKG VENTRICULAR RATE: 89 BPM
EOSINOPHIL # BLD: 0 K/UL (ref 0–0.8)
EOSINOPHIL NFR BLD: 0 % (ref 0.5–7.8)
ERYTHROCYTE [DISTWIDTH] IN BLOOD BY AUTOMATED COUNT: 17 % (ref 11.9–14.6)
FLUAV RNA SPEC QL NAA+PROBE: NOT DETECTED
FLUBV RNA SPEC QL NAA+PROBE: NOT DETECTED
GLOBULIN SER CALC-MCNC: 3.7 G/DL (ref 2.3–3.5)
GLUCOSE SERPL-MCNC: 117 MG/DL (ref 70–99)
HCT VFR BLD AUTO: 34.9 % (ref 41.1–50.3)
HGB BLD-MCNC: 11.6 G/DL (ref 13.6–17.2)
IMM GRANULOCYTES # BLD AUTO: 0.03 K/UL (ref 0–0.5)
IMM GRANULOCYTES NFR BLD AUTO: 0.8 % (ref 0–5)
LACTATE SERPL-SCNC: 1.3 MMOL/L (ref 0.5–2)
LYMPHOCYTES # BLD: 0.79 K/UL (ref 0.5–4.6)
LYMPHOCYTES NFR BLD: 19.8 % (ref 13–44)
MAGNESIUM SERPL-MCNC: 2 MG/DL (ref 1.8–2.4)
MCH RBC QN AUTO: 32 PG (ref 26.1–32.9)
MCHC RBC AUTO-ENTMCNC: 33.2 G/DL (ref 31.4–35)
MCV RBC AUTO: 96.4 FL (ref 82–102)
MONOCYTES # BLD: 0.72 K/UL (ref 0.1–1.3)
MONOCYTES NFR BLD: 18.1 % (ref 4–12)
NEUTS SEG # BLD: 2.43 K/UL (ref 1.7–8.2)
NEUTS SEG NFR BLD: 61 % (ref 43–78)
NRBC # BLD: 0 K/UL (ref 0–0.2)
PLATELET # BLD AUTO: 162 K/UL (ref 150–450)
PMV BLD AUTO: 10.3 FL (ref 9.4–12.3)
POTASSIUM SERPL-SCNC: 4.1 MMOL/L (ref 3.5–5.1)
PROCALCITONIN SERPL-MCNC: 0.08 NG/ML (ref 0–0.1)
PROT SERPL-MCNC: 7.8 G/DL (ref 6.3–8.2)
RBC # BLD AUTO: 3.62 M/UL (ref 4.23–5.6)
SARS-COV-2 RDRP RESP QL NAA+PROBE: NOT DETECTED
SODIUM SERPL-SCNC: 136 MMOL/L (ref 136–145)
SOURCE: NORMAL
TROPONIN T SERPL HS-MCNC: 21 NG/L (ref 0–22)
WBC # BLD AUTO: 4 K/UL (ref 4.3–11.1)

## 2025-02-01 PROCEDURE — 83735 ASSAY OF MAGNESIUM: CPT

## 2025-02-01 PROCEDURE — 6360000002 HC RX W HCPCS

## 2025-02-01 PROCEDURE — 87635 SARS-COV-2 COVID-19 AMP PRB: CPT

## 2025-02-01 PROCEDURE — 99285 EMERGENCY DEPT VISIT HI MDM: CPT

## 2025-02-01 PROCEDURE — 96374 THER/PROPH/DIAG INJ IV PUSH: CPT

## 2025-02-01 PROCEDURE — 70450 CT HEAD/BRAIN W/O DYE: CPT

## 2025-02-01 PROCEDURE — 83605 ASSAY OF LACTIC ACID: CPT

## 2025-02-01 PROCEDURE — 84484 ASSAY OF TROPONIN QUANT: CPT

## 2025-02-01 PROCEDURE — 74177 CT ABD & PELVIS W/CONTRAST: CPT

## 2025-02-01 PROCEDURE — 84145 PROCALCITONIN (PCT): CPT

## 2025-02-01 PROCEDURE — 80053 COMPREHEN METABOLIC PANEL: CPT

## 2025-02-01 PROCEDURE — 96375 TX/PRO/DX INJ NEW DRUG ADDON: CPT

## 2025-02-01 PROCEDURE — 93010 ELECTROCARDIOGRAM REPORT: CPT | Performed by: INTERNAL MEDICINE

## 2025-02-01 PROCEDURE — 2580000003 HC RX 258

## 2025-02-01 PROCEDURE — 85025 COMPLETE CBC W/AUTO DIFF WBC: CPT

## 2025-02-01 PROCEDURE — 6360000004 HC RX CONTRAST MEDICATION

## 2025-02-01 PROCEDURE — 71260 CT THORAX DX C+: CPT

## 2025-02-01 PROCEDURE — 93005 ELECTROCARDIOGRAM TRACING: CPT

## 2025-02-01 PROCEDURE — 87502 INFLUENZA DNA AMP PROBE: CPT

## 2025-02-01 RX ORDER — IOPAMIDOL 755 MG/ML
100 INJECTION, SOLUTION INTRAVASCULAR
Status: COMPLETED | OUTPATIENT
Start: 2025-02-01 | End: 2025-02-01

## 2025-02-01 RX ORDER — METOCLOPRAMIDE HYDROCHLORIDE 5 MG/ML
10 INJECTION INTRAMUSCULAR; INTRAVENOUS
Status: COMPLETED | OUTPATIENT
Start: 2025-02-01 | End: 2025-02-01

## 2025-02-01 RX ORDER — HYDROCODONE BITARTRATE AND ACETAMINOPHEN 5; 325 MG/1; MG/1
1 TABLET ORAL EVERY 6 HOURS PRN
Qty: 10 TABLET | Refills: 0 | Status: SHIPPED | OUTPATIENT
Start: 2025-02-01 | End: 2025-02-04

## 2025-02-01 RX ORDER — KETOROLAC TROMETHAMINE 15 MG/ML
15 INJECTION, SOLUTION INTRAMUSCULAR; INTRAVENOUS ONCE
Status: COMPLETED | OUTPATIENT
Start: 2025-02-01 | End: 2025-02-01

## 2025-02-01 RX ORDER — ONDANSETRON 4 MG/1
4 TABLET, ORALLY DISINTEGRATING ORAL 3 TIMES DAILY PRN
Qty: 21 TABLET | Refills: 0 | Status: SHIPPED | OUTPATIENT
Start: 2025-02-01

## 2025-02-01 RX ORDER — MORPHINE SULFATE 4 MG/ML
4 INJECTION, SOLUTION INTRAMUSCULAR; INTRAVENOUS ONCE
Status: COMPLETED | OUTPATIENT
Start: 2025-02-01 | End: 2025-02-01

## 2025-02-01 RX ORDER — DIPHENHYDRAMINE HYDROCHLORIDE 50 MG/ML
12.5 INJECTION INTRAMUSCULAR; INTRAVENOUS
Status: COMPLETED | OUTPATIENT
Start: 2025-02-01 | End: 2025-02-01

## 2025-02-01 RX ORDER — 0.9 % SODIUM CHLORIDE 0.9 %
500 INTRAVENOUS SOLUTION INTRAVENOUS
Status: COMPLETED | OUTPATIENT
Start: 2025-02-01 | End: 2025-02-01

## 2025-02-01 RX ADMIN — MORPHINE SULFATE 4 MG: 4 INJECTION, SOLUTION INTRAMUSCULAR; INTRAVENOUS at 16:30

## 2025-02-01 RX ADMIN — IOPAMIDOL 100 ML: 755 INJECTION, SOLUTION INTRAVENOUS at 15:06

## 2025-02-01 RX ADMIN — METOCLOPRAMIDE 10 MG: 5 INJECTION, SOLUTION INTRAMUSCULAR; INTRAVENOUS at 13:45

## 2025-02-01 RX ADMIN — DIPHENHYDRAMINE HYDROCHLORIDE 12.5 MG: 50 INJECTION INTRAMUSCULAR; INTRAVENOUS at 13:46

## 2025-02-01 RX ADMIN — SODIUM CHLORIDE 500 ML: 9 INJECTION, SOLUTION INTRAVENOUS at 14:05

## 2025-02-01 RX ADMIN — KETOROLAC TROMETHAMINE 15 MG: 15 INJECTION, SOLUTION INTRAMUSCULAR; INTRAVENOUS at 13:45

## 2025-02-01 ASSESSMENT — PAIN SCALES - GENERAL
PAINLEVEL_OUTOF10: 8
PAINLEVEL_OUTOF10: 7
PAINLEVEL_OUTOF10: 8

## 2025-02-01 ASSESSMENT — PAIN DESCRIPTION - DESCRIPTORS: DESCRIPTORS: ACHING

## 2025-02-01 ASSESSMENT — PAIN DESCRIPTION - LOCATION
LOCATION: HEAD
LOCATION: HEAD

## 2025-02-01 ASSESSMENT — PAIN - FUNCTIONAL ASSESSMENT: PAIN_FUNCTIONAL_ASSESSMENT: 0-10

## 2025-02-01 NOTE — ED PROVIDER NOTES
Emergency Department Provider Note       PCP: Kiesha Bedolla, APRN - DEEJAY   Age: 79 y.o.   Sex: male     DISPOSITION      ICD-10-CM    1. Acute nonintractable headache, unspecified headache type  R51.9       2. Acute bilateral thoracic back pain  M54.6 HYDROcodone-acetaminophen (NORCO) 5-325 MG per tablet      3. Celiac artery stenosis (HCC)  [facility]77.4 [facility]          Medical Decision Making     Patient is a 79-year-old male with past medical history of CKD, anemia, prostate cancer, AAA, COPD, hypertension presents complaining of back pain, nausea, vomiting, and headache.  Onset of this problem was \"a few days ago\".  Patient states he has had some intermittent thoracic back spasms and began to have a worsening headache over the past few days.     On presentation, patient's blood pressure is elevated although he is in pain, will recheck.  He is afebrile, all other vital signs are stable.    Patient is well-appearing in no acute distress.  HEENT exam benign and reassuring.  Lungs clear to auscultation in all fields without crackles, wheezes, or other adventitious sounds.  He does have tenderness to palpation in his thoracic back mainly on the left side and the right upper thoracic back.  No midline tenderness, palpable crepitus, or step-offs.  5 out of 5 strength and sensation bilateral lower and upper extremities.  Neurologically intact.    CBC with mild leukopenia, appears consistent with patient's previous.  H&H stable with previous as well.  CMP with creatinine of 1.45, appears consistent with patient's baseline stable electrolytes and hepatic function.  Magnesium is normal.    Troponin not elevated, EKG reveals normal sinus rhythm at 89 bpm, no evidence of STEMI, does show some minimal depressions in lateral leads, however appears consistent with patient's previous.  Patient not experiencing any chest pain at this time.    CT chest abdomen pelvis did not reveal  patient's baseline.   Lymphadenopathy:      Cervical: No cervical adenopathy.   Skin:     General: Skin is warm.   Neurological:      General: No focal deficit present.      Mental Status: He is alert and oriented to person, place, and time.      Cranial Nerves: No cranial nerve deficit.      Sensory: No sensory deficit.      Motor: No weakness.      Coordination: Coordination normal.      Gait: Gait normal.   Psychiatric:         Mood and Affect: Mood normal.         Behavior: Behavior normal.          Procedures     Procedures    Orders placed during this emergency department visit:     Orders Placed This Encounter   Procedures    Influenza A/B, Molecular    COVID-19, Rapid    CT HEAD WO CONTRAST    CT CHEST PULMONARY EMBOLISM W CONTRAST    CT ABDOMEN PELVIS W IV CONTRAST Additional Contrast? None    CBC with Auto Differential    CMP    Magnesium    Troponin    Lactic Acid    Procalcitonin    [facility]    Nursing communication    EKG 12 Lead        Medications given during this emergency department visit:     Medications   ketorolac (TORADOL) injection 15 mg (15 mg IntraVENous Given 2/1/25 1345)   metoclopramide (REGLAN) injection 10 mg (10 mg IntraVENous Given 2/1/25 1345)   diphenhydrAMINE (BENADRYL) injection 12.5 mg (12.5 mg IntraVENous Given 2/1/25 1346)   sodium chloride 0.9 % bolus 500 mL (0 mLs IntraVENous Stopped 2/1/25 1425)   iopamidol (ISOVUE-370) 76 % injection 100 mL (100 mLs IntraVENous Given 2/1/25 1506)   morphine sulfate (PF) injection 4 mg (4 mg IntraVENous Given 2/1/25 1630)       New prescriptions:     New Prescriptions    HYDROCODONE-ACETAMINOPHEN (NORCO) 5-325 MG PER TABLET    Take 1 tablet by mouth every 6 hours as needed for Pain for up to 3 days. Intended supply: 3 days. Take lowest dose possible to manage pain Max Daily Amount: 4 tablets    ONDANSETRON (ZOFRAN-ODT) 4 MG DISINTEGRATING TABLET    Take 1 tablet by mouth 3 times daily as needed for

## 2025-02-01 NOTE — DISCHARGE INSTRUCTIONS
Your workup today in the ER looked good.  You can take the pain medication as needed for back pain and the Zofran as needed for nausea.    On your CT scan there was some stenosis of your celiac artery.  Please follow-up with vascular surgery for this, their numbers listed above.    Continue to stay hydrated and push fluids.    Please return to the ED immediately if you begin to experience any new or worsening symptoms.

## 2025-02-01 NOTE — ED TRIAGE NOTES
Patient reports headache and back pain, nausea and vomiting. Patient recently start prednisone and tizanidine for back pain.

## 2025-02-05 ENCOUNTER — OFFICE VISIT (OUTPATIENT)
Age: 80
End: 2025-02-05
Payer: MEDICARE

## 2025-02-05 VITALS — WEIGHT: 170 LBS | BODY MASS INDEX: 23.8 KG/M2 | HEIGHT: 71 IN

## 2025-02-05 DIAGNOSIS — M54.6 THORACIC SPINE PAIN: Primary | ICD-10-CM

## 2025-02-05 PROCEDURE — G8420 CALC BMI NORM PARAMETERS: HCPCS | Performed by: PHYSICIAN ASSISTANT

## 2025-02-05 PROCEDURE — G8428 CUR MEDS NOT DOCUMENT: HCPCS | Performed by: PHYSICIAN ASSISTANT

## 2025-02-05 PROCEDURE — 1123F ACP DISCUSS/DSCN MKR DOCD: CPT | Performed by: PHYSICIAN ASSISTANT

## 2025-02-05 PROCEDURE — 1036F TOBACCO NON-USER: CPT | Performed by: PHYSICIAN ASSISTANT

## 2025-02-05 PROCEDURE — 99214 OFFICE O/P EST MOD 30 MIN: CPT | Performed by: PHYSICIAN ASSISTANT

## 2025-02-05 RX ORDER — LORAZEPAM 1 MG/1
TABLET ORAL
Qty: 1 TABLET | Refills: 0 | Status: SHIPPED | OUTPATIENT
Start: 2025-02-05 | End: 2025-02-21

## 2025-02-05 RX ORDER — BACLOFEN 10 MG/1
10 TABLET ORAL 3 TIMES DAILY PRN
Qty: 60 TABLET | Refills: 2 | Status: SHIPPED | OUTPATIENT
Start: 2025-02-05

## 2025-02-05 NOTE — PROGRESS NOTES
02/05/25        Name: Darwin Bailey  YOB: 1945  Gender: male  MRN: [id number]    CC: Follow-up       HPI: Darwin Bailey is a 79 y.o. male who returns for Follow-up         I previously seen and managed this patient for lower back related issues and pain with a home exercise program and lumbar injection therapy.  He reports his lower back is doing okay, but about 2 weeks ago he began having severe upper back pain.  He initially treated with oral steroids and a muscle relaxer.  The muscle relaxer he tried initially did not seem to help very much, and the steroids seem to give him a severe headache so he had to stop those.  He did have going to the emergency room where an extensive workup was done with CT imaging of the chest and abdomen as well as x-rays of the thoracic spine.  No acute pathology identified, but he was found to have some stenosis of the celiac trunk/artery.  He is scheduled to see vascular later this week to follow-up on this.  He also has a history of abdominal aneurysm that is known to him.  The patient reports having severe cramping in his mid upper back region at or below the shoulder blades in the [place] spinal area.  He has gotten some partial relief from some Norco, but the other medications he has tried have not been very helpful.  His kidney function keeps him to be a candidate from anti-inflammatories.  He cannot associate any specific injury or trauma at the onset.  He denies any rating pain around his ribs or flank or trunk region.  He denies any pain down his legs or numbness or tingling of significance.  No significant abdominal pain.  But he is having severe mid and upper back pain at essentially every position from sitting to walking to laying down.  And it is worse with any type of coughing or pressure change related action such as sneezing.        Meds/PSH/PMH/FH/SH: This information has been reviewed.      ALLERGIES:   Allergies   Allergen Reactions

## 2025-02-07 ENCOUNTER — OFFICE VISIT (OUTPATIENT)
Dept: VASCULAR SURGERY | Age: 80
End: 2025-02-07
Payer: MEDICARE

## 2025-02-07 VITALS
SYSTOLIC BLOOD PRESSURE: 90 MMHG | HEIGHT: 71 IN | BODY MASS INDEX: 24.92 KG/M2 | DIASTOLIC BLOOD PRESSURE: 61 MMHG | HEART RATE: 80 BPM | OXYGEN SATURATION: 95 % | WEIGHT: 178 LBS

## 2025-02-07 DIAGNOSIS — I71.43 INFRARENAL ABDOMINAL AORTIC ANEURYSM (AAA) WITHOUT RUPTURE (HCC): Primary | ICD-10-CM

## 2025-02-07 PROCEDURE — 1036F TOBACCO NON-USER: CPT | Performed by: SURGERY

## 2025-02-07 PROCEDURE — 1123F ACP DISCUSS/DSCN MKR DOCD: CPT | Performed by: SURGERY

## 2025-02-07 PROCEDURE — G8427 DOCREV CUR MEDS BY ELIG CLIN: HCPCS | Performed by: SURGERY

## 2025-02-07 PROCEDURE — 1159F MED LIST DOCD IN RCRD: CPT | Performed by: SURGERY

## 2025-02-07 PROCEDURE — G8420 CALC BMI NORM PARAMETERS: HCPCS | Performed by: SURGERY

## 2025-02-07 PROCEDURE — 99213 OFFICE O/P EST LOW 20 MIN: CPT | Performed by: SURGERY

## 2025-02-07 NOTE — PROGRESS NOTES
[address] [address]  [phone] FAX: [phone]    Darwin Bailey  : 1945    Chief Complaint:     History of Present Illness:   Patient follows up today for follow-up duplex study.  Patient has CT scan of the back for back pain was found of a celiac stenosis.  He denies any abdominal pain.    CURRENT MEDICATIONS:  Current Outpatient Medications   Medication Sig Dispense Refill    LORazepam (ATIVAN) 1 MG tablet Take one tablet by mouth one hour prior to MRI 1 tablet 0    baclofen (LIORESAL) 10 MG tablet Take 1 tablet by mouth 3 times daily as needed (for muscle spasms) 60 tablet 2    ondansetron (ZOFRAN-ODT) 4 MG disintegrating tablet Take 1 tablet by mouth 3 times daily as needed for Nausea or Vomiting 21 tablet 0    predniSONE 5 MG (21) TBPK As directed 1 each 0    tiZANidine (ZANAFLEX) 4 MG tablet Take 1 tablet by mouth every 12 hours as needed (muscle spasm) 20 tablet 0    midodrine (PROAMATINE) 2.5 MG tablet Take 1 tablet by mouth 3 times daily 90 tablet 3    fluticasone-salmeterol (WIXELA INHUB) 250-50 MCG/ACT AEPB diskus inhaler Inhale 1 puff into the lungs in the morning and 1 puff in the evening. 3 each 3    tiotropium (SPIRIVA RESPIMAT) 2.5 MCG/ACT AERS inhaler Inhale 2 puffs into the lungs daily 3 each 3    vitamin B-12 (CYANOCOBALAMIN) 1000 MCG tablet Take 1 tablet by mouth daily      calcium carbonate (OSCAL) 500 MG TABS tablet Take 1 tablet by mouth daily      Phenylephrine-APAP-guaiFENesin (TYLENOL SINUS SEVERE PO) Take by mouth daily as needed      albuterol sulfate HFA (PROAIR HFA) 108 (90 Base) MCG/ACT inhaler Inhale 2 puffs into the lungs every 6 hours as needed for Wheezing 3 each 3    aspirin 81 MG EC tablet Take 1 tablet by mouth daily      Cholecalciferol (VITAMIN D-3) 25 MCG (1000 UT) CAPS Take 1 capsule by mouth daily      acetaminophen (TYLENOL) 500 MG tablet Take 2 tablets by mouth every 4 hours as needed      atorvastatin (LIPITOR)

## 2025-02-11 ENCOUNTER — HOSPITAL ENCOUNTER (OUTPATIENT)
Dept: LAB | Age: 80
Discharge: HOME | End: 2025-02-11
Payer: MEDICARE

## 2025-02-11 DIAGNOSIS — C61 PROSTATE CANCER (HCC): ICD-10-CM

## 2025-02-11 LAB — PSA SERPL-MCNC: <0.1 NG/ML (ref 0–4)

## 2025-02-11 PROCEDURE — 84153 ASSAY OF PSA TOTAL: CPT

## 2025-02-11 PROCEDURE — 36415 COLL VENOUS BLD VENIPUNCTURE: CPT

## 2025-02-11 PROCEDURE — 84403 ASSAY OF TOTAL TESTOSTERONE: CPT

## 2025-02-11 NOTE — PROGRESS NOTES
Urologic Oncology  [facility]  [address] [place] [address]  [phone]        Mr. Darwin Bailey is a 79 y.o. male with a diagnosis of prostate cancer. S/P TRUS fusion prostate bx, PCa, kelvin score 4+4=8 on 11/10/22. Stage IIC (T1c, NO, MO, PSA 6.8, GG 4). 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  He was diagnosed with [place] 4+4 = 8 on 11/10/2022.  He has completed external beam radiation therapy and 2 years of ADT.  His last 6-month Lupron injection was on 7/12/2024.    His only complaint today is new back pain and back spasms.  He had an MRI of his spine last week and is scheduled to meet with a spine specialist this coming week.    He continues on Flomax and feels like he empties okay.    His PSA on 2/11/2025 was less than 0.1.  His testosterone was also less than 3.  Clearly it has not started to rebound yet.      From previous note on 8/5/24:Patient returns today for follow-up evaluation of his GS 4+4=8 prostate cancer status post XRT with 2 years ADT.  He received his final every 6 month Lupron on 7/12/2024.  He does endorse some fatigue though not significantly limiting to his daily activities.  Hot flashes are mild.  He had labs on 7/29/2024 that demonstrated PSA less than 0.1 and a total testosterone less than 3.     He denies any significant lower urinary tract symptoms including dysuria or hematuria.       Past medical, family and social histories, as well as medications and allergies, were reviewed and updated in the medical record as appropriate.    PMH:     Past Medical History:   Diagnosis Date    Abdominal aneurysm without mention of rupture 9/30/2015    [id number], on 5/1/2012 per [facility] screen    Arthritis     Asthma     Benign localized hyperplasia

## 2025-02-12 LAB — TESTOST SERPL-MCNC: <3 NG/DL (ref 264–916)

## 2025-02-14 ENCOUNTER — OFFICE VISIT (OUTPATIENT)
Dept: ONCOLOGY | Age: 80
End: 2025-02-14
Payer: MEDICARE

## 2025-02-14 VITALS
TEMPERATURE: 97.4 F | DIASTOLIC BLOOD PRESSURE: 96 MMHG | OXYGEN SATURATION: 97 % | WEIGHT: 181.8 LBS | HEIGHT: 70 IN | BODY MASS INDEX: 26.03 KG/M2 | RESPIRATION RATE: 16 BRPM | SYSTOLIC BLOOD PRESSURE: 154 MMHG | HEART RATE: 79 BPM

## 2025-02-14 DIAGNOSIS — C61 PROSTATE CANCER (HCC): Primary | ICD-10-CM

## 2025-02-14 PROCEDURE — 1159F MED LIST DOCD IN RCRD: CPT | Performed by: UROLOGY

## 2025-02-14 PROCEDURE — 99213 OFFICE O/P EST LOW 20 MIN: CPT | Performed by: UROLOGY

## 2025-02-14 PROCEDURE — 1036F TOBACCO NON-USER: CPT | Performed by: UROLOGY

## 2025-02-14 PROCEDURE — 1123F ACP DISCUSS/DSCN MKR DOCD: CPT | Performed by: UROLOGY

## 2025-02-14 PROCEDURE — 1160F RVW MEDS BY RX/DR IN RCRD: CPT | Performed by: UROLOGY

## 2025-02-14 PROCEDURE — G8417 CALC BMI ABV UP PARAM F/U: HCPCS | Performed by: UROLOGY

## 2025-02-14 PROCEDURE — G8427 DOCREV CUR MEDS BY ELIG CLIN: HCPCS | Performed by: UROLOGY

## 2025-02-14 PROCEDURE — 1125F AMNT PAIN NOTED PAIN PRSNT: CPT | Performed by: UROLOGY

## 2025-02-14 RX ORDER — FLUOROURACIL 50 MG/G
CREAM TOPICAL
COMMUNITY
Start: 2025-01-08

## 2025-02-14 RX ORDER — TAMSULOSIN HYDROCHLORIDE 0.4 MG/1
0.4 CAPSULE ORAL DAILY
Qty: 30 CAPSULE | Refills: 11 | Status: SHIPPED | OUTPATIENT
Start: 2025-02-14

## 2025-02-14 ASSESSMENT — PATIENT HEALTH QUESTIONNAIRE - PHQ9
SUM OF ALL RESPONSES TO PHQ QUESTIONS 1-9: 0
SUM OF ALL RESPONSES TO PHQ9 QUESTIONS 1 & 2: 0
SUM OF ALL RESPONSES TO PHQ QUESTIONS 1-9: 0
2. FEELING DOWN, DEPRESSED OR HOPELESS: NOT AT ALL
1. LITTLE INTEREST OR PLEASURE IN DOING THINGS: NOT AT ALL

## 2025-02-14 ASSESSMENT — ENCOUNTER SYMPTOMS
GASTROINTESTINAL NEGATIVE: 1
RESPIRATORY NEGATIVE: 1

## 2025-02-14 NOTE — PATIENT INSTRUCTIONS
discharge.    -------------------------------------------------------------------------------------------------------------------  Please call our office at [phone] if you have any  of the following symptoms:   Fever of 100.5 or greater  Chills  Shortness of breath  Swelling or pain in one leg    After office hours an answering service is available and will contact a provider for emergencies or if you are experiencing any of the above symptoms.        Hoa Mansfield MA

## 2025-02-19 ENCOUNTER — OFFICE VISIT (OUTPATIENT)
Age: 80
End: 2025-02-19
Payer: MEDICARE

## 2025-02-19 VITALS — BODY MASS INDEX: 25.91 KG/M2 | WEIGHT: 181 LBS | HEIGHT: 70 IN

## 2025-02-19 DIAGNOSIS — S22.060A CLOSED WEDGE COMPRESSION FRACTURE OF T7 VERTEBRA, INITIAL ENCOUNTER (HCC): Primary | ICD-10-CM

## 2025-02-19 PROCEDURE — 1036F TOBACCO NON-USER: CPT | Performed by: PHYSICIAN ASSISTANT

## 2025-02-19 PROCEDURE — 1123F ACP DISCUSS/DSCN MKR DOCD: CPT | Performed by: PHYSICIAN ASSISTANT

## 2025-02-19 PROCEDURE — G8428 CUR MEDS NOT DOCUMENT: HCPCS | Performed by: PHYSICIAN ASSISTANT

## 2025-02-19 PROCEDURE — G8417 CALC BMI ABV UP PARAM F/U: HCPCS | Performed by: PHYSICIAN ASSISTANT

## 2025-02-19 PROCEDURE — 99214 OFFICE O/P EST MOD 30 MIN: CPT | Performed by: PHYSICIAN ASSISTANT

## 2025-02-19 RX ORDER — HYDROCODONE BITARTRATE AND ACETAMINOPHEN 5; 325 MG/1; MG/1
1 TABLET ORAL EVERY 6 HOURS PRN
Qty: 20 TABLET | Refills: 0 | Status: SHIPPED | OUTPATIENT
Start: 2025-02-19 | End: 2025-02-24

## 2025-02-19 NOTE — PROGRESS NOTES
02/19/25        Name: Darwin Bailey  YOB: 1945  Gender: male  MRN: [id number]        MRI/IMAGING/STUDY FOLLOWUP    CC: Follow-up (MRI results)       HPI: Darwin Bailey is a 79 y.o. male who returns for Follow-up (MRI results)           Patient presents to the office today for follow-up to review MRI results.  He continues to have debilitating mid and upper back pain.  He has been taking the Norco prescribed by his previous doctors and this has helped some.  Still cannot associate any specific injury or trauma at the onset of his symptoms.          Meds/PSH/PMH/FH/SH: This information has been reviewed.      ALLERGIES:   Allergies   Allergen Reactions    Sulfa Antibiotics Other (See Comments)     headaches              Physical Examination:            Imaging:         MRI Result (most recent):  MRI THORACIC SPINE WO CONTRAST 02/10/2025    Narrative  EXAMINATION: MRI THORACIC SPINE WO CONTRAST 2/10/2025 4:08 PM    ACCESSION NUMBER: [id number]    COMPARISON: CT abdomen and pelvis 2/5/2025    INDICATION: Pain in thoracic spine, thoracic spine pain for 3 weeks    TECHNIQUE: Multisequence, multiplanar MR images were obtained of the thoracic  spine without the administration of intravenous contrast.    FINDINGS:    12 thoracic type vertebral bodies. Vertebral body numbering performed beginning  at the C2 level on the spinal localizer images.    There is a dextroconvex thoracic spine curvature with exaggeration of the normal  thoracic kyphosis.    There is an acute compression fracture of the superior endplate of T7, with  approximately 35% vertebral body height loss. There is no significant fracture  retropulsion.    There is grade 1 anterolisthesis of T1 on T2, T2 on T3, T3 on T4, T7 on T8, T8  on T9, T9 on T10, and T10 on T11.    No focal suspicious osseous lesion in the thoracic spine.    There is no definite abnormal thoracic spinal cord signal.    There is a hiatal hernia containing fat

## 2025-02-20 ENCOUNTER — TELEPHONE (OUTPATIENT)
Dept: ORTHOPEDIC SURGERY | Age: 80
End: 2025-02-20

## 2025-02-24 ENCOUNTER — OFFICE VISIT (OUTPATIENT)
Age: 80
End: 2025-02-24
Payer: MEDICARE

## 2025-02-24 ENCOUNTER — CLINICAL DOCUMENTATION (OUTPATIENT)
Dept: ORTHOPEDIC SURGERY | Age: 80
End: 2025-02-24

## 2025-02-24 DIAGNOSIS — S22.060A CLOSED WEDGE COMPRESSION FRACTURE OF T7 VERTEBRA, INITIAL ENCOUNTER (HCC): Primary | ICD-10-CM

## 2025-02-24 PROCEDURE — G8417 CALC BMI ABV UP PARAM F/U: HCPCS | Performed by: ANESTHESIOLOGY

## 2025-02-24 PROCEDURE — 1036F TOBACCO NON-USER: CPT | Performed by: ANESTHESIOLOGY

## 2025-02-24 PROCEDURE — 1159F MED LIST DOCD IN RCRD: CPT | Performed by: ANESTHESIOLOGY

## 2025-02-24 PROCEDURE — 99204 OFFICE O/P NEW MOD 45 MIN: CPT | Performed by: ANESTHESIOLOGY

## 2025-02-24 PROCEDURE — G8427 DOCREV CUR MEDS BY ELIG CLIN: HCPCS | Performed by: ANESTHESIOLOGY

## 2025-02-24 PROCEDURE — 1123F ACP DISCUSS/DSCN MKR DOCD: CPT | Performed by: ANESTHESIOLOGY

## 2025-02-24 RX ORDER — IBUPROFEN 200 MG
400 TABLET ORAL EVERY 6 HOURS PRN
COMMUNITY

## 2025-02-24 NOTE — PROGRESS NOTES
Previous interventions:  Procedure Date Results   ***                                     Previous medication trials: Listed:  Class Medication Results   NSAIDs ***    Oral steroids prednisone    Tylenol 500mg    Antiepileptics     Antidepressants     Relaxants Baclofen  tizanidine    Topicals/transdermaI patches     Narcotics Norco      Previous tests/studies:  Study Date Results   MRI thoracic spine 2/11/25 EXAMINATION: MRI THORACIC SPINE WO CONTRAST 2/10/2025 4:08 PM     ACCESSION NUMBER: [id number]     COMPARISON: CT abdomen and pelvis 2/5/2025     INDICATION: Pain in thoracic spine, thoracic spine pain for 3 weeks     TECHNIQUE: Multisequence, multiplanar MR images were obtained of the thoracic  spine without the administration of intravenous contrast.     FINDINGS:     12 thoracic type vertebral bodies. Vertebral body numbering performed beginning  at the C2 level on the spinal localizer images.     There is a dextroconvex thoracic spine curvature with exaggeration of the normal  thoracic kyphosis.     There is an acute compression fracture of the superior endplate of T7, with  approximately 35% vertebral body height loss. There is no significant fracture  retropulsion.     There is grade 1 anterolisthesis of T1 on T2, T2 on T3, T3 on T4, T7 on T8, T8  on T9, T9 on T10, and T10 on T11.     No focal suspicious osseous lesion in the thoracic spine.     There is no definite abnormal thoracic spinal cord signal.      There is a hiatal hernia containing fat and portions of the stomach.     There is aneurysmal dilation of the infrarenal abdominal aorta, measuring up to  3.7 cm. This is not significantly changed in size in comparison to the abdominal  CT of 2/5/2025 within limits of differences in imaging technique..     LEVEL SPECIFIC FINDINGS:     T1-T2: Grade 1 anterolisthesis of T1 on T2 due to facet arthropathy. There is  uncovering of the disc with severe bilateral neuroforaminal narrowing and mild  spinal

## 2025-02-24 NOTE — PROGRESS NOTES
Chronic Pain Consult Note      Plan:     A comprehensive pain management plan may consist of the following: Testing, Therapy, Medications, Interventions, Consults, and Follow up.    T7 vertebral compression fracture  Reviewed imaging which displays lumbar MRI showing 20 % height loss of T7 with positive STIR findings.  There is no retropulsion.  No compromise of central canal or masses infections.  Discussed with tube augmentation with patient who elected to proceed.  Spent considerable time discussing osteoporosis as patient's imaging is evidence of osteoporosis.  Referral to rheumatology in place.    General Recommendations: The pain condition that the patient suffers from is best treated with a multidisciplinary approach that involves an increase in physical activity to prevent de-conditioning and worsening of the pain cycle, as well as psychological counseling (formal and/or informal) to address the co morbid psychological effects of pain. Treatment will often involve judicious use of pain medications and interventional procedures to decrease the pain, allowing the patient to participate in the physical activity that will ultimately produce long-lasting pain reductions. The goal of the multidisciplinary approach is to return the patient to a higher level of overall function and to restore their ability to perform activities of daily living.      Referring Provider: Ernst Saenz PA  Assessment:      Chief Complaint: No chief complaint on file.      Darwin Bailey is a 79 y.o. male being [facility] for the following diagnoses:    Diagnosis:  No diagnosis found.      Subjective:      HPI:  Mr. Bailey is seen in consultation at the request of Ernst Saenz PA for evaluation and recommendations regarding the above diagnoses and the below HPI.    HPI on02/24/25: 79-year-old male presents for evaluation treatment of compression fracture it has been 3 to 4 weeks present.  Patient is

## 2025-02-24 NOTE — PERIOP NOTE
Patient verified name and .  Order for consent NOT found in EHR at time of PAT visit. Unable to verify case posting against order; surgery verified by patient.    Type 1B surgery, phone assessment complete.  Orders not received.  Labs per surgeon: none ordered  Labs per anesthesia protocol: Hgb 11.6 on 25    Patient answered medical/surgical history questions at their best of ability. All prior to admission medications documented in EPIC.    Patient instructed to continue taking all prescription medications up to the day of surgery but to take only the following medications the day of surgery according to anesthesia guidelines with a small sip of water: hydrocodone-acetaminophen if needed,  albuterol inhaler if needed and bring the inhaler to the hospital, Wixela, Spiriva, atorvastatin, finasteride, tamsulosin, midodrine if needed, famotidine.      Also, patient is requested to take 2 Tylenol in the morning and then again before bed on the day before surgery- if not taking hydrocodone-acetaminophen.  Regular or extra strength may be used.       Patient informed that all vitamins and supplements should be held 7 days prior to surgery and NSAIDS (preventative aspirin 81 mg, ibuprofen) 5 days prior to surgery. Prescription meds to hold: none    Patient instructed on the following:    > Arrive at [facility] Entr[facility], time of arrival to be called the day before by 1700  > No food after midnight, patient may drink clear liquids up until 2 hours prior to arrival. No gum, candy, mints.   > Responsible adult must drive patient to the hospital, stay during surgery, and patient will need supervision 24 hours after anesthesia  > Use non moisturizing soap in shower the night before surgery and on the morning of surgery  > All piercings must be removed prior to arrival.    > Leave all valuables (money and jewelry) at home but bring insurance card and ID on DOS.   > You may be required to pay a deductible or co-pay on the day of

## 2025-02-26 ENCOUNTER — CLINICAL DOCUMENTATION (OUTPATIENT)
Age: 80
End: 2025-02-26

## 2025-02-27 NOTE — PERIOP NOTE
Preop department called to notify patient of arrival time for scheduled procedure. Instructions given to   - Arrive at [facility] Entr[facility] [address].  - Nothing to eat or drink after midnight unless otherwise indicated. No gum, mints, or ice chips. You may have a small sip of water with medications you were instructed to take.   - Have a responsible adult to drive patient to the hospital, stay during surgery, and patient will need supervision 24 hours after anesthesia.   - Use antibacterial soap in shower the night before surgery and on the morning of surgery.       Was patient contacted: yes, pt  Voicemail left: n/a  Numbers contacted: [phone]   Arrival time: 0800

## 2025-02-28 ENCOUNTER — HOSPITAL ENCOUNTER (OUTPATIENT)
Age: 80
Setting detail: OUTPATIENT SURGERY
Discharge: HOME | End: 2025-02-28
Attending: ANESTHESIOLOGY | Admitting: ANESTHESIOLOGY
Payer: MEDICARE

## 2025-02-28 ENCOUNTER — ANESTHESIA (OUTPATIENT)
Dept: SURGERY | Age: 80
End: 2025-02-28
Payer: MEDICARE

## 2025-02-28 ENCOUNTER — APPOINTMENT (OUTPATIENT)
Dept: GENERAL RADIOLOGY | Age: 80
End: 2025-02-28
Attending: ANESTHESIOLOGY
Payer: MEDICARE

## 2025-02-28 ENCOUNTER — ANESTHESIA EVENT (OUTPATIENT)
Dept: SURGERY | Age: 80
End: 2025-02-28
Payer: MEDICARE

## 2025-02-28 VITALS
HEIGHT: 71 IN | HEART RATE: 62 BPM | BODY MASS INDEX: 24.5 KG/M2 | WEIGHT: 175 LBS | RESPIRATION RATE: 18 BRPM | TEMPERATURE: 97.3 F | SYSTOLIC BLOOD PRESSURE: 159 MMHG | DIASTOLIC BLOOD PRESSURE: 99 MMHG | OXYGEN SATURATION: 95 %

## 2025-02-28 PROCEDURE — 6360000002 HC RX W HCPCS: Performed by: STUDENT IN AN ORGANIZED HEALTH CARE EDUCATION/TRAINING PROGRAM

## 2025-02-28 PROCEDURE — 3700000001 HC ADD 15 MINUTES (ANESTHESIA): Performed by: ANESTHESIOLOGY

## 2025-02-28 PROCEDURE — 2580000003 HC RX 258: Performed by: STUDENT IN AN ORGANIZED HEALTH CARE EDUCATION/TRAINING PROGRAM

## 2025-02-28 PROCEDURE — 2709999900 HC NON-CHARGEABLE SUPPLY: Performed by: ANESTHESIOLOGY

## 2025-02-28 PROCEDURE — 3600000014 HC SURGERY LEVEL 4 ADDTL 15MIN: Performed by: ANESTHESIOLOGY

## 2025-02-28 PROCEDURE — 7100000011 HC PHASE II RECOVERY - ADDTL 15 MIN: Performed by: ANESTHESIOLOGY

## 2025-02-28 PROCEDURE — 7100000001 HC PACU RECOVERY - ADDTL 15 MIN: Performed by: ANESTHESIOLOGY

## 2025-02-28 PROCEDURE — 7100000000 HC PACU RECOVERY - FIRST 15 MIN: Performed by: ANESTHESIOLOGY

## 2025-02-28 PROCEDURE — 3600000004 HC SURGERY LEVEL 4 BASE: Performed by: ANESTHESIOLOGY

## 2025-02-28 PROCEDURE — 3700000000 HC ANESTHESIA ATTENDED CARE: Performed by: ANESTHESIOLOGY

## 2025-02-28 PROCEDURE — C1713 ANCHOR/SCREW BN/BN,TIS/BN: HCPCS | Performed by: ANESTHESIOLOGY

## 2025-02-28 PROCEDURE — 22513 PERQ VERTEBRAL AUGMENTATION: CPT | Performed by: ANESTHESIOLOGY

## 2025-02-28 PROCEDURE — 6360000002 HC RX W HCPCS: Performed by: ANESTHESIOLOGY

## 2025-02-28 PROCEDURE — 7100000010 HC PHASE II RECOVERY - FIRST 15 MIN: Performed by: ANESTHESIOLOGY

## 2025-02-28 PROCEDURE — C1894 INTRO/SHEATH, NON-LASER: HCPCS | Performed by: ANESTHESIOLOGY

## 2025-02-28 PROCEDURE — 6360000004 HC RX CONTRAST MEDICATION: Performed by: ANESTHESIOLOGY

## 2025-02-28 PROCEDURE — 2500000003 HC RX 250 WO HCPCS: Performed by: STUDENT IN AN ORGANIZED HEALTH CARE EDUCATION/TRAINING PROGRAM

## 2025-02-28 DEVICE — BONE CEMENT CX01A XPEDE US
Type: IMPLANTABLE DEVICE | Site: BACK | Status: FUNCTIONAL
Brand: KYPHON® XPEDE™ BONE CEMENT

## 2025-02-28 RX ORDER — IOPAMIDOL 612 MG/ML
INJECTION, SOLUTION INTRAVASCULAR PRN
Status: DISCONTINUED | OUTPATIENT
Start: 2025-02-28 | End: 2025-02-28 | Stop reason: ALTCHOICE

## 2025-02-28 RX ORDER — DEXMEDETOMIDINE HYDROCHLORIDE 100 UG/ML
INJECTION, SOLUTION INTRAVENOUS
Status: DISCONTINUED | OUTPATIENT
Start: 2025-02-28 | End: 2025-02-28 | Stop reason: SDUPTHER

## 2025-02-28 RX ORDER — SODIUM CHLORIDE, SODIUM LACTATE, POTASSIUM CHLORIDE, CALCIUM CHLORIDE 600; 310; 30; 20 MG/100ML; MG/100ML; MG/100ML; MG/100ML
INJECTION, SOLUTION INTRAVENOUS CONTINUOUS
Status: DISCONTINUED | OUTPATIENT
Start: 2025-02-28 | End: 2025-02-28 | Stop reason: HOSPADM

## 2025-02-28 RX ORDER — LIDOCAINE HYDROCHLORIDE 10 MG/ML
1 INJECTION, SOLUTION INFILTRATION; PERINEURAL
Status: DISCONTINUED | OUTPATIENT
Start: 2025-02-28 | End: 2025-02-28 | Stop reason: HOSPADM

## 2025-02-28 RX ORDER — PROPOFOL 10 MG/ML
INJECTION, EMULSION INTRAVENOUS
Status: DISCONTINUED | OUTPATIENT
Start: 2025-02-28 | End: 2025-02-28 | Stop reason: SDUPTHER

## 2025-02-28 RX ORDER — KETAMINE HCL IN NACL, ISO-OSM 20 MG/2 ML
SYRINGE (ML) INJECTION
Status: COMPLETED
Start: 2025-02-28 | End: 2025-02-28

## 2025-02-28 RX ORDER — OXYCODONE HYDROCHLORIDE 5 MG/1
5 TABLET ORAL
Status: DISCONTINUED | OUTPATIENT
Start: 2025-02-28 | End: 2025-02-28 | Stop reason: HOSPADM

## 2025-02-28 RX ORDER — SODIUM CHLORIDE, SODIUM LACTATE, POTASSIUM CHLORIDE, CALCIUM CHLORIDE 600; 310; 30; 20 MG/100ML; MG/100ML; MG/100ML; MG/100ML
INJECTION, SOLUTION INTRAVENOUS
Status: DISCONTINUED | OUTPATIENT
Start: 2025-02-28 | End: 2025-02-28 | Stop reason: SDUPTHER

## 2025-02-28 RX ORDER — NALOXONE HYDROCHLORIDE 0.4 MG/ML
INJECTION, SOLUTION INTRAMUSCULAR; INTRAVENOUS; SUBCUTANEOUS PRN
Status: DISCONTINUED | OUTPATIENT
Start: 2025-02-28 | End: 2025-02-28 | Stop reason: HOSPADM

## 2025-02-28 RX ORDER — ONDANSETRON 2 MG/ML
4 INJECTION INTRAMUSCULAR; INTRAVENOUS
Status: DISCONTINUED | OUTPATIENT
Start: 2025-02-28 | End: 2025-02-28 | Stop reason: HOSPADM

## 2025-02-28 RX ORDER — SODIUM CHLORIDE 0.9 % (FLUSH) 0.9 %
5-40 SYRINGE (ML) INJECTION PRN
Status: DISCONTINUED | OUTPATIENT
Start: 2025-02-28 | End: 2025-02-28 | Stop reason: HOSPADM

## 2025-02-28 RX ORDER — KETAMINE HCL IN NACL, ISO-OSM 20 MG/2 ML
SYRINGE (ML) INJECTION
Status: DISCONTINUED | OUTPATIENT
Start: 2025-02-28 | End: 2025-02-28 | Stop reason: SDUPTHER

## 2025-02-28 RX ORDER — SODIUM CHLORIDE 0.9 % (FLUSH) 0.9 %
5-40 SYRINGE (ML) INJECTION EVERY 12 HOURS SCHEDULED
Status: DISCONTINUED | OUTPATIENT
Start: 2025-02-28 | End: 2025-02-28 | Stop reason: HOSPADM

## 2025-02-28 RX ORDER — LIDOCAINE HYDROCHLORIDE 20 MG/ML
INJECTION, SOLUTION EPIDURAL; INFILTRATION; INTRACAUDAL; PERINEURAL
Status: DISCONTINUED | OUTPATIENT
Start: 2025-02-28 | End: 2025-02-28 | Stop reason: SDUPTHER

## 2025-02-28 RX ORDER — BUPIVACAINE HYDROCHLORIDE 5 MG/ML
INJECTION, SOLUTION EPIDURAL; INTRACAUDAL PRN
Status: DISCONTINUED | OUTPATIENT
Start: 2025-02-28 | End: 2025-02-28 | Stop reason: ALTCHOICE

## 2025-02-28 RX ORDER — SODIUM CHLORIDE 9 MG/ML
INJECTION, SOLUTION INTRAVENOUS PRN
Status: DISCONTINUED | OUTPATIENT
Start: 2025-02-28 | End: 2025-02-28 | Stop reason: HOSPADM

## 2025-02-28 RX ADMIN — DEXMEDETOMIDINE 8 MCG: 100 INJECTION, SOLUTION, CONCENTRATE INTRAVENOUS at 09:47

## 2025-02-28 RX ADMIN — SODIUM CHLORIDE, SODIUM LACTATE, POTASSIUM CHLORIDE, AND CALCIUM CHLORIDE: 600; 310; 30; 20 INJECTION, SOLUTION INTRAVENOUS at 09:39

## 2025-02-28 RX ADMIN — PROPOFOL 75 MCG/KG/MIN: 10 INJECTION, EMULSION INTRAVENOUS at 09:48

## 2025-02-28 RX ADMIN — DEXMEDETOMIDINE 4 MCG: 100 INJECTION, SOLUTION, CONCENTRATE INTRAVENOUS at 09:56

## 2025-02-28 RX ADMIN — Medication 2000 MG: at 09:51

## 2025-02-28 RX ADMIN — LIDOCAINE HYDROCHLORIDE 100 MG: 20 INJECTION, SOLUTION EPIDURAL; INFILTRATION; INTRACAUDAL; PERINEURAL at 09:47

## 2025-02-28 RX ADMIN — Medication 10 MG: at 09:41

## 2025-02-28 RX ADMIN — PROPOFOL 10 MG: 10 INJECTION, EMULSION INTRAVENOUS at 09:56

## 2025-02-28 RX ADMIN — Medication 10 MG: at 09:44

## 2025-02-28 RX ADMIN — PROPOFOL 20 MG: 10 INJECTION, EMULSION INTRAVENOUS at 09:50

## 2025-02-28 RX ADMIN — DEXMEDETOMIDINE 8 MCG: 100 INJECTION, SOLUTION, CONCENTRATE INTRAVENOUS at 09:50

## 2025-02-28 RX ADMIN — PROPOFOL 30 MG: 10 INJECTION, EMULSION INTRAVENOUS at 09:47

## 2025-02-28 RX ADMIN — HYDROMORPHONE HYDROCHLORIDE 0.5 MG: 1 INJECTION, SOLUTION INTRAMUSCULAR; INTRAVENOUS; SUBCUTANEOUS at 10:27

## 2025-02-28 ASSESSMENT — PAIN DESCRIPTION - ORIENTATION: ORIENTATION: RIGHT;LEFT;POSTERIOR

## 2025-02-28 ASSESSMENT — LIFESTYLE VARIABLES: SMOKING_STATUS: 1

## 2025-02-28 ASSESSMENT — PAIN SCALES - GENERAL
PAINLEVEL_OUTOF10: 5
PAINLEVEL_OUTOF10: 7
PAINLEVEL_OUTOF10: 3
PAINLEVEL_OUTOF10: 0

## 2025-02-28 ASSESSMENT — COPD QUESTIONNAIRES: CAT_SEVERITY: MILD

## 2025-02-28 ASSESSMENT — PAIN DESCRIPTION - LOCATION: LOCATION: BACK;ARM

## 2025-02-28 ASSESSMENT — PAIN DESCRIPTION - DESCRIPTORS: DESCRIPTORS: ACHING;DISCOMFORT

## 2025-02-28 ASSESSMENT — PAIN - FUNCTIONAL ASSESSMENT: PAIN_FUNCTIONAL_ASSESSMENT: 0-10

## 2025-02-28 NOTE — H&P
Chronic Pain Consult Note      Plan:     A comprehensive pain management plan may consist of the following: Testing, Therapy, Medications, Interventions, Consults, and Follow up.    T7 vertebral compression fracture  Reviewed imaging which displays lumbar MRI showing 20 % height loss of T7 with positive STIR findings.  There is no retropulsion.  No compromise of central canal or masses infections.  Discussed with tube augmentation with patient who elected to proceed.  Spent considerable time discussing osteoporosis as patient's imaging is evidence of osteoporosis.  Referral to rheumatology in place.  Discussed kypoplasty in depth, patient requested to proceed .       General Recommendations: The pain condition that the patient suffers from is best treated with a multidisciplinary approach that involves an increase in physical activity to prevent de-conditioning and worsening of the pain cycle, as well as psychological counseling (formal and/or informal) to address the co morbid psychological effects of pain. Treatment will often involve judicious use of pain medications and interventional procedures to decrease the pain, allowing the patient to participate in the physical activity that will ultimately produce long-lasting pain reductions. The goal of the multidisciplinary approach is to return the patient to a higher level of overall function and to restore their ability to perform activities of daily living.      Referring Provider: Ernst Saenz PA  Assessment:      Chief Complaint: No chief complaint on file.      Darwin Bailey is a 79 y.o. male [facility] the following diagnoses:    Diagnosis:  No diagnosis found.      Subjective:      HPI:  Mr. Bailey is seen in consultation at the request of Ernst Saenz PA for evaluation and recommendations regarding the above diagnoses and the below HPI.    HPI on02/24/25: 79-year-old male presents for evaluation treatment of  Imaging  Navigator in order to communicate to the referring provider or his/her designee  as outlined in Section II.C.2.a.ii-iii of the ACR Practice Guideline for  Communication of Diagnostic Imaging Findings.   XR thoracic spine 1/27/25 EXAM: XR THORACIC SPINE (3 VIEWS)  INDICATION: Pain in thoracic spine  COMPARISON: None.     TECHNIQUE: Thoracic Spine AP/LAT imaging obtained.     FINDINGS:  Alignment is within normal limits. Vertebral body heights are preserved. No  radiographic evidence of fracture. Intervertebral disc spaces are maintained.  There is a moderate size hiatal hernia.  IMPRESSION:  No acute radiographic abnormality.                         Previous therapies:  Type of Therapy Date Results   Massage therapy  Exacerbates pain   Home exercise sheets Currently Exacerbates pain                              Opioid Monitoring:  Last UDS (results): N/A (N/A) Opioid agreement signed: N/A  [facility] database: evaluated today, appropriate Compliance issues: N/A  Last opioid dose change: N/A    Opioid Risk Tool Score (low/mod/high}:    Opioid Risk Tool Female I Male   Family history of substance abuse     Alcohol 1 3   Illegal drugs 2 3   Prescription drugs 4 4   Personal history of substance abuse     Alcohol 3 3   Illegal drugs 4 4   Prescription drugs 5 5   Age between 16-45 years 1 1   History of preadolescent sexual abuse 3 0   Psychological disease     ADD, OCD, bipolar, schizophrenia 2 2   Depression 1 1   Total: 3 or less = low, 8 or higher= high    Controlled Substance Agreement signed on the following date(s}: Workmans Comp:  Lawsuit:    Past Medical History:   Diagnosis Date    Abdominal aneurysm without mention of rupture 9/30/2015    [id number], on 5/1/2012 per [employer] screen    Arthritis     Asthma     Benign localized hyperplasia of prostate without urinary obstruction and other lower urinary tract symptoms (LUTS) 9/30/2015    [facility]. urologist    Bladder neck obstruction 9/30/2015    Cancer (HCC) 02/2017

## 2025-02-28 NOTE — ANESTHESIA POSTPROCEDURE EVALUATION
Department of Anesthesiology  Postprocedure Note    Patient: Darwin Bailey  MRN: [id number]  YOB: 1945  Date of evaluation: 2/28/2025    Procedure Summary       Date: 02/28/25 Room / Location: [facility] OP OR 05 / [facility]    Anesthesia Start: 0939 Anesthesia Stop: 1026    Procedure: KYPHOPLASTY (Back) Diagnosis:       Closed wedge compression fracture of T7 vertebra, initial encounter ([facility])      (Closed wedge compression fracture of T7 vertebra, initial encounter ([facility]) [S22.060A])    Surgeons: Minh Gonzalez MD Responsible Provider: Ronny Quigley Jr., MD    Anesthesia Type: MAC, TIVA ASA Status: 3            Anesthesia Type: MAC, TIVA    Leon Phase I: Leon Score: 10    Leon Phase II: Leon Score: 10    Anesthesia Post Evaluation    Patient location during evaluation: PACU  Patient participation: complete - patient participated  Level of consciousness: awake  Pain score: 0  Airway patency: patent  Nausea & Vomiting: no nausea and no vomiting  Cardiovascular status: blood pressure returned to baseline and hemodynamically stable  Respiratory status: acceptable, spontaneous ventilation and nonlabored ventilation  Hydration status: euvolemic  Multimodal analgesia pain management approach  Pain management: adequate    No notable events documented.

## 2025-02-28 NOTE — ANESTHESIA PRE PROCEDURE
Department of Anesthesiology  Preprocedure Note       Name:  Darwin Bailey   Age:  79 y.o.  :  1945                                          MRN:  [id number]         Date:  2025      Surgeon: Surgeon(s):  Minh Gonzalez MD    Procedure: Procedure(s):  KYPHOPLASTY    Medications prior to admission:   Prior to Admission medications    Medication Sig Start Date End Date Taking? Authorizing Provider   ibuprofen (ADVIL;MOTRIN) 200 MG tablet Take 2 tablets by mouth every 6 hours as needed for Pain   Yes Alice Childs MD   fluorouracil (EFUDEX) 5 % cream APPLY TO AFFECTED AREAS OF NOSE, EARS, AND SCALP AT BEDTIME 2 WEEKS ON X 2 WEEKS OFF X 2 WEEKS ON 25   Alice Childs MD   tamsulosin (FLOMAX) 0.4 MG capsule Take 1 capsule by mouth daily 25   Ronny Richardson MD   baclofen (LIORESAL) 10 MG tablet Take 1 tablet by mouth 3 times daily as needed (for muscle spasms)  Patient not taking: Reported on 2025   Ernst Saenz PA   tiZANidine (ZANAFLEX) 4 MG tablet Take 1 tablet by mouth every 12 hours as needed (muscle spasm)  Patient not taking: Reported on 2025   Kiesha Bedolla APRN - CNP   midodrine (PROAMATINE) 2.5 MG tablet Take 1 tablet by mouth 3 times daily  Patient taking differently: Take 1 tablet by mouth 3 times daily as needed Per patient - takes for SBP less than 100 24   Ken Carrera MD   fluticasone-salmeterol (WIXELA INHUB) 250-50 MCG/ACT AEPB diskus inhaler Inhale 1 puff into the lungs in the morning and 1 puff in the evening. 24   Regi Mckeon MD   tiotropium (SPIRIVA RESPIMAT) 2.5 MCG/ACT AERS inhaler Inhale 2 puffs into the lungs daily 24   Regi Mckeon MD   vitamin B-12 (CYANOCOBALAMIN) 1000 MCG tablet Take 1 tablet by mouth daily    Alice Childs MD   calcium carbonate (OSCAL) 500 MG TABS tablet Take 1 tablet by mouth daily    Alice Childs MD   Phenylephrine-APAP-guaiFENesin

## 2025-02-28 NOTE — DISCHARGE INSTRUCTIONS
Postoperative  Instructions:      Weightbearing or Lifting:  You  are  not  allowed  to  lift  any  weight  or  bear  any  weight above 15lbs for 24 hours following surgery. Avoid twisting and bending beyond 60 degrees for 24 hours. No restriction for walking.       Dressing  instructions:    Keep  your  dressing  clean  and  dry postoperatively for the first 48 hours. It can be removed following this timeline.    Showering  Instructions:  May shower BUT keep surgical dressing clean and dry until removed as explained above.      Pain Control:  Take home based therapy approved or prescribed by your doctor, which can potentially include tylenol or NSAIDs such as ibuprofen.         Please  call  [phone]  with any concern and ask to speak with medical team   Concerning problems include:      -  Excessive  redness  of  the  incisions      -  Drainage  for  more  than  2  Days after surgery or any foul smelling drainage  -  Fever  of  more  than  101.5  F      Follow up appointment will be made 10-14  days  after  your  Surgery with the physician.    Minh Gonzalez MD    MEDICATION INTERACTION:    During your procedure you potentially received a medication or medications which may reduce the effectiveness of oral contraceptives. Please consider other forms of contraception for 1 month following your procedure if you are currently using oral contraceptives as your primary form of birth control. In addition to this, we recommend continuing your oral contraceptive as prescribed, unless otherwise instructed by your physician, during this time.    ACTIVITY  As tolerated and as directed by your doctor.   You may shower in 24 hours. Do not take a bath until cleared by MD.     DIET  Clear liquids until no nausea or vomiting, then light diet for the first day.  Advance to regular diet on second day, unless your doctor orders otherwise.   If nausea and vomiting continues, call your doctor.     PAIN  Take pain medication as

## 2025-02-28 NOTE — OP NOTE
Operative Note      Patient: Darwin Bailey  YOB: 1945  MRN: [id number]    Date of Procedure: 2/28/2025    Pre-Op Diagnosis Codes:      * Closed wedge compression fracture of T7 vertebra, initial encounter ([facility]) [S22.060A]    Post-Op Diagnosis: Same       Procedure(s):  KYPHOPLASTY    Surgeon(s):  Minh Gonzalez MD    Assistant:   * No surgical staff found *    Anesthesia: General    Estimated Blood Loss (mL): Minimal    Complications: None    Specimens:   * No specimens in log *    Implants:  Implant Name Type Inv. Item Serial No.  Lot No. LRB No. Used Action   CEMENT BNE KYPHON XPEDE - [id number]  CEMENT BNE KYPHON XPEDE  [employer] SOFAMOR DANEK-WD [id number] N/A 1 Implanted         Drains: * No LDAs found *    Findings:  Infection Present At Time Of Surgery (PATOS) (choose all levels that have infection present):  No infection present  Other Findings: none    Detailed Description of Procedure:   After confirming written and informed consent and discussing the risk, benefits and alternatives for the procedure, the patient had the correct site marked by the physician performing the procedure. The specific risks of bleeding, infection, vascular puncture, nerve injury and cement embolization were discussed. The patient was taken to the operating room.    General anesthesia was initiated by the anesthesia team.    A hat and mask were worn, and the hands were cleaned with an alcohol-containing solution. Sterile gloves were then worn.    The patient was then placed in the prone position. The skin overlying the thoracolumbar spine was then prepped with Duraprep. Sterile towels were placed to frame the operative field. A sterile down drape was placed over the lower extremities. A laparotomy drape was placed with the opening over the operative site. loban was placed over the opening. A time out was then performed involving the physician, registered nurse, radiology technician.    Fluoroscopy  was obtained., with a total balloon volume of3 ml.    The balloons were deflated and removed. Cement was placed using frequent fluoroscopic guidance. 4 ml of cement was placed through the right cannula; 4 ml of cement was placed through the left cannula. No evidence of vascular uptake was noted. The cement cannula was removed, no evidence of cement tracking to the working cannula was noted. The cannula was removed. Dermabond was used to close the incision.      Electronically signed by Minh Gonzalez MD on 2/28/2025 at 10:29 AM

## 2025-03-03 NOTE — TELEPHONE ENCOUNTER
Pt stated that his whole back hurts, not where the incision is but his whole back.  Pt stated that it feels like a muscle has been pulled and that he's sore.  I did inform the pt that that should ease up, in a few days.  He stated that he has some Norco leftover from an old rx that he will take.    GUY

## 2025-03-03 NOTE — TELEPHONE ENCOUNTER
Pt's wife lvm on 2/28 stating that pt was experiencing a great deal of back pain following his kyphoplasty. She requested a RC to further discuss.

## 2025-03-04 RX ORDER — CYCLOBENZAPRINE HCL 10 MG
10 TABLET ORAL 2 TIMES DAILY PRN
Qty: 60 TABLET | Refills: 0 | Status: SHIPPED | OUTPATIENT
Start: 2025-03-04

## 2025-03-13 ENCOUNTER — OFFICE VISIT (OUTPATIENT)
Age: 80
End: 2025-03-13

## 2025-03-13 DIAGNOSIS — S22.060A CLOSED WEDGE COMPRESSION FRACTURE OF T7 VERTEBRA, INITIAL ENCOUNTER (HCC): Primary | ICD-10-CM

## 2025-03-13 PROCEDURE — 99024 POSTOP FOLLOW-UP VISIT: CPT | Performed by: ANESTHESIOLOGY

## 2025-03-13 NOTE — PROGRESS NOTES
Chronic Pain Consult Note      Plan:     A comprehensive pain management plan may consist of the following: Testing, Therapy, Medications, Interventions, Consults, and Follow up.    T7 vertebral compression fracture  Reviewed imaging which displays lumbar MRI showing 20 % height loss of T7 with positive STIR findings.  There is no retropulsion.  No compromise of central canal or masses infections.  Status post vertebral augmentation  Spent considerable time discussing osteoporosis as patient's imaging is evidence of osteoporosis.  Referral to rheumatology in place.    General Recommendations: The pain condition that the patient suffers from is best treated with a multidisciplinary approach that involves an increase in physical activity to prevent de-conditioning and worsening of the pain cycle, as well as psychological counseling (formal and/or informal) to address the co morbid psychological effects of pain. Treatment will often involve judicious use of pain medications and interventional procedures to decrease the pain, allowing the patient to participate in the physical activity that will ultimately produce long-lasting pain reductions. The goal of the multidisciplinary approach is to return the patient to a higher level of overall function and to restore their ability to perform activities of daily living.      Referring Provider: No ref. provider found  Assessment:      Chief Complaint: No chief complaint on file.      Darwin Baiely is a 79 y.o. male being [facility] for the following diagnoses:    Diagnosis:  No diagnosis found.      Subjective:      HPI:  Mr. Bailey is seen in consultation at the request of No ref. provider found for evaluation and recommendations regarding the above diagnoses and the below HPI.    HPI on03/13/25: Patient returns after undergoing T7 vertebral augmentation.  He reports significant improvement, 100% of pain that was coursing through his upper back

## 2025-03-19 ENCOUNTER — TELEPHONE (OUTPATIENT)
Dept: INTERNAL MEDICINE CLINIC | Facility: CLINIC | Age: 80
End: 2025-03-19

## 2025-03-19 ENCOUNTER — OFFICE VISIT (OUTPATIENT)
Age: 80
End: 2025-03-19
Payer: MEDICARE

## 2025-03-19 VITALS
BODY MASS INDEX: 24.36 KG/M2 | HEART RATE: 84 BPM | DIASTOLIC BLOOD PRESSURE: 62 MMHG | WEIGHT: 174 LBS | HEIGHT: 71 IN | SYSTOLIC BLOOD PRESSURE: 110 MMHG

## 2025-03-19 DIAGNOSIS — R42 ORTHOSTATIC DIZZINESS: ICD-10-CM

## 2025-03-19 DIAGNOSIS — R06.02 SHORTNESS OF BREATH: ICD-10-CM

## 2025-03-19 DIAGNOSIS — S22.000A COMPRESSION FRACTURE OF BODY OF THORACIC VERTEBRA (HCC): Primary | ICD-10-CM

## 2025-03-19 DIAGNOSIS — E78.00 PURE HYPERCHOLESTEROLEMIA: Primary | ICD-10-CM

## 2025-03-19 PROCEDURE — 1036F TOBACCO NON-USER: CPT | Performed by: INTERNAL MEDICINE

## 2025-03-19 PROCEDURE — G8420 CALC BMI NORM PARAMETERS: HCPCS | Performed by: INTERNAL MEDICINE

## 2025-03-19 PROCEDURE — G8428 CUR MEDS NOT DOCUMENT: HCPCS | Performed by: INTERNAL MEDICINE

## 2025-03-19 PROCEDURE — 1123F ACP DISCUSS/DSCN MKR DOCD: CPT | Performed by: INTERNAL MEDICINE

## 2025-03-19 PROCEDURE — 99214 OFFICE O/P EST MOD 30 MIN: CPT | Performed by: INTERNAL MEDICINE

## 2025-03-19 NOTE — TELEPHONE ENCOUNTER
Please advise. Patient recently broke his back and had to have surgery. Dr. Gonzalez recommended he get a DEXA bone density scan done, since they are unsure how he broke his back. Patient is asking if you would order this for him?

## 2025-03-19 NOTE — PROGRESS NOTES
atorvastatin (LIPITOR) 80 MG tablet Take 1 tablet by mouth daily      famotidine (PEPCID) 20 MG tablet Take 2 tablets by mouth daily      finasteride (PROSCAR) 5 MG tablet Take 1 tablet by mouth daily      cyclobenzaprine (FLEXERIL) 10 MG tablet Take 1 tablet by mouth 2 times daily as needed for Muscle spasms (Patient not taking: Reported on 3/19/2025) 60 tablet 0    fluorouracil (EFUDEX) 5 % cream APPLY TO AFFECTED AREAS OF NOSE, EARS, AND SCALP AT BEDTIME 2 WEEKS ON X 2 WEEKS OFF X 2 WEEKS ON      baclofen (LIORESAL) 10 MG tablet Take 1 tablet by mouth 3 times daily as needed (for muscle spasms) (Patient not taking: Reported on 3/19/2025) 60 tablet 2    tiZANidine (ZANAFLEX) 4 MG tablet Take 1 tablet by mouth every 12 hours as needed (muscle spasm) (Patient not taking: Reported on 3/19/2025) 20 tablet 0     No current facility-administered medications for this visit.               Social History     Tobacco Use    Smoking status: Former     Current packs/day: 0.00     Average packs/day: 0.3 packs/day for 54.0 years (13.5 ttl pk-yrs)     Types: Cigarettes     Start date:      Quit date:      Years since quittin.2     Passive exposure: Past    Smokeless tobacco: Never    Tobacco comments:     Has not smoked for 6-7 weeks   Substance Use Topics    Alcohol use: Yes     Alcohol/week: 10.0 - 14.0 standard drinks of alcohol     Types: 10 - 14 Drinks containing 0.5 oz of alcohol per week              PHYSICAL EXAM:   /62   Pulse 84   Ht 1.803 m (5' 11\")   Wt 78.9 kg (174 lb)   BMI 24.27 kg/m²    Constitutional: Oriented to person, place, and time. Appears well-developed and well-nourished.   Head: Normocephalic and atraumatic.   Neck: Neck supple.   Cardiovascular: Normal rate and regular rhythm with no murmur -No JVP  Pulmonary/Chest: Breath sounds normal.   Abdominal: Soft.   Musculoskeletal: No edema.   Neurological: Alert and oriented to person, place, and time.   Skin: Skin is warm and dry.

## 2025-03-20 DIAGNOSIS — M48.062 SPINAL STENOSIS OF LUMBAR REGION WITH NEUROGENIC CLAUDICATION: ICD-10-CM

## 2025-03-20 DIAGNOSIS — M54.16 LUMBAR RADICULOPATHY: Primary | ICD-10-CM

## 2025-03-20 NOTE — TELEPHONE ENCOUNTER
Patient notified DEXA has been ordered and he will need an office visit afterwards to discuss results. Patient verbalized understanding.

## 2025-03-24 ENCOUNTER — OFFICE VISIT (OUTPATIENT)
Dept: ORTHOPEDIC SURGERY | Age: 80
End: 2025-03-24
Payer: MEDICARE

## 2025-03-24 DIAGNOSIS — M48.062 SPINAL STENOSIS OF LUMBAR REGION WITH NEUROGENIC CLAUDICATION: ICD-10-CM

## 2025-03-24 DIAGNOSIS — M25.512 BILATERAL SHOULDER PAIN, UNSPECIFIED CHRONICITY: ICD-10-CM

## 2025-03-24 DIAGNOSIS — M54.16 LUMBAR RADICULOPATHY: Primary | ICD-10-CM

## 2025-03-24 DIAGNOSIS — M25.511 BILATERAL SHOULDER PAIN, UNSPECIFIED CHRONICITY: ICD-10-CM

## 2025-03-24 DIAGNOSIS — M47.816 LUMBAR SPONDYLOSIS: ICD-10-CM

## 2025-03-24 PROCEDURE — 64483 NJX AA&/STRD TFRM EPI L/S 1: CPT | Performed by: PHYSICAL MEDICINE & REHABILITATION

## 2025-03-24 RX ORDER — TRIAMCINOLONE ACETONIDE 40 MG/ML
40 INJECTION, SUSPENSION INTRA-ARTICULAR; INTRAMUSCULAR ONCE
Status: COMPLETED | OUTPATIENT
Start: 2025-03-24 | End: 2025-03-24

## 2025-03-24 RX ADMIN — TRIAMCINOLONE ACETONIDE 40 MG: 40 INJECTION, SUSPENSION INTRA-ARTICULAR; INTRAMUSCULAR at 15:43

## 2025-03-24 NOTE — PROGRESS NOTES
Name: Darwin Bailey  YOB: 1945  Gender: male  MRN: [id number]        Transforaminal RELL Procedure Note    Procedure: Right  L4-L5 transforaminal epidural steroid injections     Precautions: Darwin Bailey denies prior sensitivity to steroid, local anesthetic, iodine, or shellfish.       Consent:  Consent was obtained prior to the procedure. The procedure was discussed at length with Darwin Bailey. He was given the opportunity to ask questions regarding the procedure and its associated risks.  In addition to the potential risks associated with the procedure itself, the patient was informed both verbally and in writing of potential side effects of the use glucocorticoids.  The patient appeared to comprehend the informed consent and desired to have the procedure performed, and informed consent was signed.     He was placed in a prone position on the fluoroscopy table and the skin was prepped and draped in a routine sterile fashion. The areas to be injected were each anesthetized with 1 ml of 1% Lidocaine. A 22 gauge 3.5 inch spinal needle was carefully advanced under fluoroscopic guidance to the right L4-L5 transforaminal space  0.5 ml of 70% of Omnipaque was injected to confirm proper needle placement and absence of subdural or vascular flow Once proper placement was confirmed, 0.5ml of 0.25 marcaine and 40 mg kenalog were injected through the spinal needle.     Fluoroscopic guidance was used intermittently over a 10-minute period to insure proper needle placement and his safety. A hard copy of the fluoroscopic image has been placed in his chart and is saved on the C-arm hard drive. He was monitored for 30 minutes after the procedure and discharged home in a stable fashion with a routine follow up.    Procedural Diagnosis:     ICD-10-CM    1. Lumbar radiculopathy  M54.16 FL NERVE BLOCK LUMBOSACRAL 1ST     triamcinolone acetonide (KENALOG-40) injection 40 mg      2. Spinal

## 2025-04-01 ENCOUNTER — HOSPITAL ENCOUNTER (OUTPATIENT)
Dept: MAMMOGRAPHY | Age: 80
Discharge: HOME | End: 2025-04-04
Payer: MEDICARE

## 2025-04-01 DIAGNOSIS — S22.000A COMPRESSION FRACTURE OF BODY OF THORACIC VERTEBRA (HCC): ICD-10-CM

## 2025-04-01 PROCEDURE — 77080 DXA BONE DENSITY AXIAL: CPT

## 2025-04-03 ENCOUNTER — RESULTS FOLLOW-UP (OUTPATIENT)
Dept: INTERNAL MEDICINE CLINIC | Facility: CLINIC | Age: 80
End: 2025-04-03

## 2025-04-11 ENCOUNTER — HOSPITAL ENCOUNTER (OUTPATIENT)
Dept: PHYSICAL THERAPY | Age: 80
Setting detail: RECURRING SERIES
Discharge: HOME | End: 2025-04-14
Payer: MEDICARE

## 2025-04-11 DIAGNOSIS — M54.59 OTHER LOW BACK PAIN: Primary | ICD-10-CM

## 2025-04-11 DIAGNOSIS — G89.29 CHRONIC LEFT SHOULDER PAIN: ICD-10-CM

## 2025-04-11 DIAGNOSIS — M25.511 CHRONIC RIGHT SHOULDER PAIN: ICD-10-CM

## 2025-04-11 DIAGNOSIS — M25.512 CHRONIC LEFT SHOULDER PAIN: ICD-10-CM

## 2025-04-11 DIAGNOSIS — G89.29 CHRONIC RIGHT SHOULDER PAIN: ICD-10-CM

## 2025-04-11 PROCEDURE — 97110 THERAPEUTIC EXERCISES: CPT

## 2025-04-11 PROCEDURE — 97162 PT EVAL MOD COMPLEX 30 MIN: CPT

## 2025-04-11 ASSESSMENT — PAIN SCALES - GENERAL: PAINLEVEL_OUTOF10: 3

## 2025-04-11 ASSESSMENT — PAIN DESCRIPTION - ORIENTATION: ORIENTATION: LOWER;RIGHT

## 2025-04-11 ASSESSMENT — PAIN DESCRIPTION - LOCATION: LOCATION: BACK;ARM

## 2025-04-11 NOTE — THERAPY EVALUATION
Darwin Bailey  : 1945  Primary: Medicare Part A And B (Medicare)  Secondary: [employer] [place] MEDIC[employer] SUPP [facility] @ [address] [place]  [facility] [address]  Phone: [phone]  Fax: [phone] Plan Frequency: 2 times per week for 90 days    Plan of Care/Certification Expiration Date: 07/10/25        Plan of Care/Certification Expiration Date:  Plan of Care/Certification Expiration Date: 07/10/25    Frequency/Duration: Plan Frequency: 2 times per week for 90 days      Time In/Out:   Time In: 1345  Time Out: 1425      PT Visit Info:    Progress Note Due Date: 25  Total # of Visits to Date: 1  Progress Note Counter: 1      Visit Count:  1                OUTPATIENT PHYSICAL THERAPY:             Initial Assessment 2025               Episode (PT: Lumbar radiculopathy/arm pain)         Treatment Diagnosis:     Other low back pain  Chronic right shoulder pain  Chronic left shoulder pain  Medical/Referring Diagnosis:    Lumbar radiculopathy [M54.16]  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  He reports a recent fracture in his thoracic spine that was fixed with surgery.  He reports his lower back has been helped mostly with injections.  He reports that he will still have pain that goes down his leg on occasion.  He reports that he will take pain medication as needed.  He reports he also has shoulder pain that started after he tried adding weights during

## 2025-04-15 ENCOUNTER — HOSPITAL ENCOUNTER (OUTPATIENT)
Dept: PHYSICAL THERAPY | Age: 80
Setting detail: RECURRING SERIES
Discharge: HOME | End: 2025-04-18
Payer: MEDICARE

## 2025-04-15 PROCEDURE — 97110 THERAPEUTIC EXERCISES: CPT

## 2025-04-15 PROCEDURE — 97140 MANUAL THERAPY 1/> REGIONS: CPT

## 2025-04-15 ASSESSMENT — PAIN SCALES - GENERAL: PAINLEVEL_OUTOF10: 4

## 2025-04-15 NOTE — PROGRESS NOTES
proper body posture, promote proper body mechanics, and promote proper body breathing techniques.  Progressed resistance, range, repetitions, and complexity of movement as indicated.    MANUAL:  10 minutes:  Myofascial and trigger point areas to low back and paraspinals to decrease tightness, increase flexibility.       Date:  4/15/2025   Activity/Exercise Parameters   BACK EXERCISES / STRETCHES      Bridging 10 reps to tolerance  HEP   Pelvic tilts 10 reps to tolerance  HEP   SKTC 10 second hold  3 reps  B LE  HEP       Lower Trunk Rotation X 10 reps bilaterally                SHOULDER EXERCISES      UBE X 6 minutes   Thera band Rows  Thera band Extensions  Thera band ER/IR Green x 15 B  Green x 15 B  Red x 15 B   Pulleys Flexion x 20 reps      Treatment/Session Summary:    Treatment Assessment:  Patient did well with added exercises today and manual to low back.  Communication/Consultation:  None today  Equipment provided today:  HEP  Recommendations/Intent for next treatment session: Next visit will focus on improving overall mobility with daily activities.    >Total Treatment Billable Duration:    Time In: 0815  Time Out: 0855      WALLY CARLTON PTA         Charge Capture  Events  [employer] Portal  Appt Desk  Attendance Report     Future Appointments   Date Time Provider Department Center   4/18/2025  1:00 PM Oksana Brenner, PT [facility]   4/22/2025  8:15 AM Wally Carlton PTA [facility]   4/25/2025  9:00 AM Oksana Brenner, PT [facility]   7/9/2025  8:30 AM Ernst Saenz PA [facility]   7/31/2025  9:00 AM Kiesha Bedolla, APRN - CNP [facility]   8/8/2025  9:00 AM [facility] ULTRASOUND 2 [facility]   8/8/2025  9:45 AM Rohan Kearney MD [facility]   9/18/2025 12:00 PM PERIPHERAL [facility]   9/25/2025  9:00 AM Lon Mota MD [facility]   9/26/2025  9:00 AM Ken Carrera MD UCD[facility]   10/22/2025  9:00 AM PERIPHERAL [facility]   10/29/2025  9:00 AM Miladys

## 2025-04-18 ENCOUNTER — HOSPITAL ENCOUNTER (OUTPATIENT)
Dept: PHYSICAL THERAPY | Age: 80
Setting detail: RECURRING SERIES
End: 2025-04-18
Payer: MEDICARE

## 2025-04-22 ENCOUNTER — HOSPITAL ENCOUNTER (OUTPATIENT)
Dept: PHYSICAL THERAPY | Age: 80
Setting detail: RECURRING SERIES
Discharge: HOME | End: 2025-04-25
Payer: MEDICARE

## 2025-04-22 PROCEDURE — 97140 MANUAL THERAPY 1/> REGIONS: CPT

## 2025-04-22 PROCEDURE — 97110 THERAPEUTIC EXERCISES: CPT

## 2025-04-22 ASSESSMENT — PAIN SCALES - GENERAL: PAINLEVEL_OUTOF10: 5

## 2025-04-22 NOTE — PROGRESS NOTES
Darwin Bailey  : 1945  Primary: Medicare Part A And B (Medicare)  Secondary: MUTUAL [place] MEDICARE SUPP [facility] @ [address]  [facility] [address]  Phone: [phone]  Fax: [phone] Plan Frequency: 2 times per week for 90 days  Plan of Care/Certification Expiration Date: 07/10/25        Plan of Care/Certification Expiration Date:  Plan of Care/Certification Expiration Date: 07/10/25    Frequency/Duration: Plan Frequency: 2 times per week for 90 days      Time In/Out:   Time In: 0815  Time Out: 08      PT Visit Info:    Progress Note Due Date: 25  Total # of Visits to Date: 3  Progress Note Counter: 3      Visit Count:  3    OUTPATIENT PHYSICAL THERAPY:   Treatment Note 2025       Episode  (PT: Lumbar radiculopathy/arm pain)               Treatment Diagnosis:    Other low back pain  Chronic right shoulder pain  Chronic left shoulder pain  Medical/Referring Diagnosis:    Lumbar radiculopathy [M54.16]  Spinal stenosis of lumbar region with neurogenic claudication [M48.062]  Lumbar spondylosis [M47.816]  Bilateral shoulder pain, unspecified chronicity [M25.511, M25.512]    Referring Physician:  Oksana Orr MD MD Orders:  PT Eval and Treat   Return MD Appt:  TBD   Date of Onset:  Onset Date:  (Chronic)     Allergies:   Sulfa antibiotics  Restrictions/Precautions:   None      Interventions Planned (Treatment may consist of any combination of the following):     See Assessment Note    Subjective Comments:   \"I am doing ok\"  Initial Pain Level:     5/10  Post Session Pain Level:      4/10  Medications Last Reviewed: 2025  Updated Objective Findings:  None Today  Treatment   THERAPEUTIC EXERCISE: (30 minutes):    Exercises per grid below to improve mobility and strength.  Required minimal visual, verbal, and manual cues to promote proper body alignment, promote proper body posture, promote proper body mechanics, and promote proper

## 2025-04-25 ENCOUNTER — HOSPITAL ENCOUNTER (OUTPATIENT)
Dept: PHYSICAL THERAPY | Age: 80
Setting detail: RECURRING SERIES
Discharge: HOME | End: 2025-04-28
Payer: MEDICARE

## 2025-04-25 PROCEDURE — 97110 THERAPEUTIC EXERCISES: CPT

## 2025-04-25 PROCEDURE — 97140 MANUAL THERAPY 1/> REGIONS: CPT

## 2025-04-25 ASSESSMENT — PAIN DESCRIPTION - LOCATION: LOCATION: BACK;ARM

## 2025-04-25 ASSESSMENT — PAIN DESCRIPTION - ORIENTATION: ORIENTATION: LOWER;RIGHT

## 2025-04-25 ASSESSMENT — PAIN SCALES - GENERAL: PAINLEVEL_OUTOF10: 4

## 2025-04-25 NOTE — PROGRESS NOTES
Darwin Bailey  : 1945  Primary: Medicare Part A And B (Medicare)  Secondary: MUTUAL [place] MEDICARE SUPP [facility] @ [address]  [facility] [address]  Phone: [phone]  Fax: [phone] Plan Frequency: 2 times per week for 90 days  Plan of Care/Certification Expiration Date: 07/10/25        Plan of Care/Certification Expiration Date:  Plan of Care/Certification Expiration Date: 07/10/25    Frequency/Duration: Plan Frequency: 2 times per week for 90 days      Time In/Out:   Time In: 0900  Time Out: 0940      PT Visit Info:    Progress Note Due Date: 25  Total # of Visits to Date: 4  Progress Note Counter: 4      Visit Count:  4    OUTPATIENT PHYSICAL THERAPY:   Treatment Note 2025       Episode  (PT: Lumbar radiculopathy/arm pain)               Treatment Diagnosis:    Other low back pain  Chronic right shoulder pain  Chronic left shoulder pain  Medical/Referring Diagnosis:    Lumbar radiculopathy [M54.16]  Spinal stenosis of lumbar region with neurogenic claudication [M48.062]  Lumbar spondylosis [M47.816]  Bilateral shoulder pain, unspecified chronicity [M25.511, M25.512]    Referring Physician:  Oksana Orr MD MD Orders:  PT Eval and Treat   Return MD Appt:  TBD   Date of Onset:  Onset Date:  (Chronic)     Allergies:   Sulfa antibiotics  Restrictions/Precautions:   None      Interventions Planned (Treatment may consist of any combination of the following):     See Assessment Note    Subjective Comments:   Patient reports he is doing ok today with some pain in his back and right arm.    Initial Pain Level: Lower, Right Back, Arm 4/10  Post Session Pain Level: Lower, Right  Back, Arm 2/10  Medications Last Reviewed: 2025  Updated Objective Findings:  None Today  Treatment   THERAPEUTIC EXERCISE: (30 minutes):    Exercises per grid below to improve mobility and strength.  Required minimal visual, verbal, and manual cues to

## 2025-04-30 ENCOUNTER — HOSPITAL ENCOUNTER (OUTPATIENT)
Dept: PHYSICAL THERAPY | Age: 80
Setting detail: RECURRING SERIES
Discharge: HOME | End: 2025-05-03
Payer: MEDICARE

## 2025-04-30 PROCEDURE — 97140 MANUAL THERAPY 1/> REGIONS: CPT

## 2025-04-30 PROCEDURE — 97110 THERAPEUTIC EXERCISES: CPT

## 2025-04-30 ASSESSMENT — PAIN DESCRIPTION - LOCATION: LOCATION: BACK;ARM

## 2025-04-30 ASSESSMENT — PAIN SCALES - GENERAL: PAINLEVEL_OUTOF10: 4

## 2025-04-30 NOTE — PROGRESS NOTES
Darwin Bailey  : 1945  Primary: Medicare Part A And B (Medicare)  Secondary: MUTUAL [place] MEDICARE SUPP [facility] @ [address]  [facility] [address]  Phone: [phone]  Fax: [phone] Plan Frequency: 2 times per week for 90 days  Plan of Care/Certification Expiration Date: 07/10/25        Plan of Care/Certification Expiration Date:  Plan of Care/Certification Expiration Date: 07/10/25    Frequency/Duration: Plan Frequency: 2 times per week for 90 days      Time In/Out:   Time In: 1430  Time Out: 1510      PT Visit Info:    Progress Note Due Date: 25  Total # of Visits to Date: 5  Progress Note Counter: 5      Visit Count:  5    OUTPATIENT PHYSICAL THERAPY:   Treatment Note 2025       Episode  (PT: Lumbar radiculopathy/arm pain)               Treatment Diagnosis:    Other low back pain  Chronic right shoulder pain  Chronic left shoulder pain  Medical/Referring Diagnosis:    Lumbar radiculopathy [M54.16]  Spinal stenosis of lumbar region with neurogenic claudication [M48.062]  Lumbar spondylosis [M47.816]  Bilateral shoulder pain, unspecified chronicity [M25.511, M25.512]    Referring Physician:  Oksana Orr MD MD Orders:  PT Eval and Treat   Return MD Appt:  TBD   Date of Onset:  Onset Date:  (Chronic)     Allergies:   Sulfa antibiotics  Restrictions/Precautions:   None      Interventions Planned (Treatment may consist of any combination of the following):     See Assessment Note    Subjective Comments:   Patient reports his back has been bothering him more today.    Initial Pain Level:   Back, Arm 4/10  Post Session Pain Level:    Back, Arm 2/10  Medications Last Reviewed: 2025  Updated Objective Findings:  None Today  Treatment   THERAPEUTIC EXERCISE: (30 minutes):    Exercises per grid below to improve mobility and strength.  Required minimal visual, verbal, and manual cues to promote proper body alignment, promote proper

## 2025-05-06 ENCOUNTER — HOSPITAL ENCOUNTER (OUTPATIENT)
Dept: PHYSICAL THERAPY | Age: 80
Setting detail: RECURRING SERIES
Discharge: HOME | End: 2025-05-09
Payer: MEDICARE

## 2025-05-06 PROCEDURE — 97110 THERAPEUTIC EXERCISES: CPT

## 2025-05-06 PROCEDURE — 97140 MANUAL THERAPY 1/> REGIONS: CPT

## 2025-05-06 NOTE — PROGRESS NOTES
Darwin Bailey  : 1945  Primary: Medicare Part A And B (Medicare)  Secondary: MUTUAL [place] MEDICARE SUPP [facility] @ [address]  [facility] [address]  Phone: [phone]  Fax: [phone] Plan Frequency: 2 times per week for 90 days  Plan of Care/Certification Expiration Date: 07/10/25        Plan of Care/Certification Expiration Date:  Plan of Care/Certification Expiration Date: 07/10/25    Frequency/Duration: Plan Frequency: 2 times per week for 90 days      Time In/Out:   Time In: 0945  Time Out: 1025      PT Visit Info:    Progress Note Due Date: 25  Total # of Visits to Date: 6  Progress Note Counter: 6      Visit Count:  6    OUTPATIENT PHYSICAL THERAPY:   Treatment Note 2025       Episode  (PT: Lumbar radiculopathy/arm pain)               Treatment Diagnosis:    Other low back pain  Chronic right shoulder pain  Chronic left shoulder pain  Medical/Referring Diagnosis:    Lumbar radiculopathy [M54.16]  Spinal stenosis of lumbar region with neurogenic claudication [M48.062]  Lumbar spondylosis [M47.816]  Bilateral shoulder pain, unspecified chronicity [M25.511, M25.512]    Referring Physician:  Oksana Orr MD MD Orders:  PT Eval and Treat   Return MD Appt:  TBD   Date of Onset:  Onset Date:  (Chronic)     Allergies:   Sulfa antibiotics  Restrictions/Precautions:   None      Interventions Planned (Treatment may consist of any combination of the following):     See Assessment Note    Subjective Comments:   \"I am having no pain right now, just have a hard time breathing because of COPD\"    Initial Pain Level:   0   /10  Post Session Pain Level:    0   /10  Medications Last Reviewed: 2025  Updated Objective Findings:  None Today  Treatment   THERAPEUTIC EXERCISE: (30 minutes):    Exercises per grid below to improve mobility and strength.  Required minimal visual, verbal, and manual cues to promote proper body alignment, promote

## 2025-05-07 RX ORDER — MIDODRINE HYDROCHLORIDE 2.5 MG/1
2.5 TABLET ORAL 2 TIMES DAILY
Qty: 180 TABLET | Refills: 3 | Status: SHIPPED | OUTPATIENT
Start: 2025-05-07

## 2025-05-07 NOTE — TELEPHONE ENCOUNTER
Requested Prescriptions     Pending Prescriptions Disp Refills    midodrine (PROAMATINE) 2.5 MG tablet [Pharmacy Med Name: MIDODRINE HCL 2.5 MG TABLET] 180 tablet 3     Sig: Take 1 tablet by mouth 2 times daily           Last ov 3/19/25 states, Stable.Continue midodrine.  Will increase to at least twice daily dosing     Medication verified

## 2025-05-09 ENCOUNTER — HOSPITAL ENCOUNTER (OUTPATIENT)
Dept: PHYSICAL THERAPY | Age: 80
Setting detail: RECURRING SERIES
End: 2025-05-09
Payer: MEDICARE

## 2025-05-12 ENCOUNTER — HOSPITAL ENCOUNTER (OUTPATIENT)
Dept: PHYSICAL THERAPY | Age: 80
Setting detail: RECURRING SERIES
End: 2025-05-12
Payer: MEDICARE

## 2025-05-15 ENCOUNTER — HOSPITAL ENCOUNTER (OUTPATIENT)
Dept: PHYSICAL THERAPY | Age: 80
Setting detail: RECURRING SERIES
Discharge: HOME | End: 2025-05-18
Payer: MEDICARE

## 2025-05-15 PROCEDURE — 97140 MANUAL THERAPY 1/> REGIONS: CPT

## 2025-05-15 PROCEDURE — 97110 THERAPEUTIC EXERCISES: CPT

## 2025-05-15 ASSESSMENT — PAIN DESCRIPTION - ORIENTATION: ORIENTATION: RIGHT

## 2025-05-15 ASSESSMENT — PAIN SCALES - GENERAL: PAINLEVEL_OUTOF10: 3

## 2025-05-15 ASSESSMENT — PAIN DESCRIPTION - LOCATION: LOCATION: ARM;BACK

## 2025-05-15 NOTE — PROGRESS NOTES
Darwin Bailey  : 1945  Primary: Medicare Part A And B (Medicare)  Secondary: MUTUAL [place] MEDICARE SUPP [facility] @ [address]  [facility] [address]  Phone: [phone]  Fax: [phone] Plan Frequency: 2 times per week for 90 days    Plan of Care/Certification Expiration Date: 07/10/25        Plan of Care/Certification Expiration Date:  Plan of Care/Certification Expiration Date: 07/10/25    Frequency/Duration: Plan Frequency: 2 times per week for 90 days      Time In/Out:   Time In: 0945  Time Out: 1025      PT Visit Info:    Progress Note Due Date: 25  Total # of Visits to Date: 7  Progress Note Counter: 1      Visit Count:  7                OUTPATIENT PHYSICAL THERAPY:             Progress Report 5/15/2025               Episode (PT: Lumbar radiculopathy/arm pain)         Treatment Diagnosis:     Other low back pain  Chronic right shoulder pain  Chronic left shoulder pain  Medical/Referring Diagnosis:    Lumbar radiculopathy [M54.16]  Spinal stenosis of lumbar region with neurogenic claudication [M48.062]  Lumbar spondylosis [M47.816]  Bilateral shoulder pain, unspecified chronicity [M25.511, M25.512]    Referring Physician:  Oksana Orr MD MD Orders:  PT Eval and Treat   Return MD Appt:  TBD  Date of Onset:  Onset Date:  (Chronic)  Allergies:  Sulfa antibiotics  Restrictions/Precautions:    None      Medications Last Reviewed: 5/15/2025     SUBJECTIVE   History of Injury/Illness (Reason for Referral):  Patient reports that he has had back pain for several years.  He reports a recent fracture in his thoracic spine that was fixed with surgery.  He reports his lower back has been helped mostly with injections.  He reports that he will still have pain that goes down his leg on occasion.  He reports that he will take pain medication as needed.  He reports he also has shoulder pain that started after he tried adding weights during

## 2025-05-15 NOTE — PROGRESS NOTES
Darwin Bailey  : 1945  Primary: Medicare Part A [place] B (Medicare)  Secondary: MUTUAL [place] MEDICARE SUPP [facility] @ [address]  [facility] [address]  Phone: [phone]  Fax: [phone] Plan Frequency: 2 times per week for 90 days  Plan of Care/Certification Expiration Date: 07/10/25        Plan of Care/Certification Expiration Date:  Plan of Care/Certification Expiration Date: 07/10/25    Frequency/Duration: Plan Frequency: 2 times per week for 90 days      Time In/Out:   Time In: 0945  Time Out: 1025      PT Visit Info:    Progress Note Due Date: 25  Total # of Visits to Date: 7  Progress Note Counter: 1      Visit Count:  7    OUTPATIENT PHYSICAL THERAPY:   Treatment Note 5/15/2025       Episode  (PT: Lumbar radiculopathy/arm pain)               Treatment Diagnosis:    Other low back pain  Chronic right shoulder pain  Chronic left shoulder pain  Medical/Referring Diagnosis:    Lumbar radiculopathy [M54.16]  Spinal stenosis of lumbar region with neurogenic claudication [M48.062]  Lumbar spondylosis [M47.816]  Bilateral shoulder pain, unspecified chronicity [M25.511, M25.512]    Referring Physician:  Oksana Orr MD MD Orders:  PT Eval and Treat   Return MD Appt:  TBD   Date of Onset:  Onset Date:  (Chronic)     Allergies:   Sulfa antibiotics  Restrictions/Precautions:   None      Interventions Planned (Treatment may consist of any combination of the following):     See Assessment Note    Subjective Comments:   Patient reports his blood pressure has been low lately.  He reports his shoulder has been bothering him more lately.    Initial Pain Level: Right 0Arm, Back 3/10  Post Session Pain Level: Right  0Arm, Back 3/10  Medications Last Reviewed: 5/15/2025  Updated Objective Findings:  None Today  Treatment   THERAPEUTIC EXERCISE: (30 minutes):    Exercises per grid below to improve mobility and strength.  Required minimal visual, verbal,

## 2025-05-20 ENCOUNTER — APPOINTMENT (OUTPATIENT)
Dept: PHYSICAL THERAPY | Age: 80
End: 2025-05-20
Payer: MEDICARE

## 2025-05-22 ENCOUNTER — HOSPITAL ENCOUNTER (OUTPATIENT)
Dept: PHYSICAL THERAPY | Age: 80
Setting detail: RECURRING SERIES
Discharge: HOME | End: 2025-05-25
Payer: MEDICARE

## 2025-05-22 PROCEDURE — 97140 MANUAL THERAPY 1/> REGIONS: CPT

## 2025-05-22 PROCEDURE — 97110 THERAPEUTIC EXERCISES: CPT

## 2025-05-22 NOTE — PROGRESS NOTES
Darwin Bailey  : 1945  Primary: Medicare Part A [place] B (Medicare)  Secondary: MUTUAL [place] MEDICARE SUPP [facility] @ [address]  [facility] [address]  Phone: [phone]  Fax: [phone] Plan Frequency: 2 times per week for 90 days  Plan of Care/Certification Expiration Date: 07/10/25        Plan of Care/Certification Expiration Date:  Plan of Care/Certification Expiration Date: 07/10/25    Frequency/Duration: Plan Frequency: 2 times per week for 90 days      Time In/Out:   Time In: 1305  Time Out: 1345      PT Visit Info:    Progress Note Due Date: 25  Total # of Visits to Date: 8  Progress Note Counter: 2      Visit Count:  8    OUTPATIENT PHYSICAL THERAPY:   Treatment Note 2025       Episode  (PT: Lumbar radiculopathy/arm pain)               Treatment Diagnosis:    Other low back pain  Chronic right shoulder pain  Chronic left shoulder pain  Medical/Referring Diagnosis:    Lumbar radiculopathy [M54.16]  Spinal stenosis of lumbar region with neurogenic claudication [M48.062]  Lumbar spondylosis [M47.816]  Bilateral shoulder pain, unspecified chronicity [M25.511, M25.512]    Referring Physician:  Oksana Orr MD MD Orders:  PT Eval and Treat   Return MD Appt:  TBD   Date of Onset:  Onset Date:  (Chronic)     Allergies:   Sulfa antibiotics  Restrictions/Precautions:   None      Interventions Planned (Treatment may consist of any combination of the following):     See Assessment Note    Subjective Comments:   Patient rates his current back pain 5/10, R shoulder pain 2-3/10.    Initial Pain Level:      5/10 low back, 2-3/10 R shoulder  Post Session Pain Level:       5/10 low back, 2-3/10 R shoulder  Medications Last Reviewed: 2025  Updated Objective Findings:  None Today  Treatment   THERAPEUTIC EXERCISE: (30 minutes):    Exercises per grid below to improve mobility and strength.  Required minimal visual, verbal, and manual cues to

## 2025-05-28 ENCOUNTER — HOSPITAL ENCOUNTER (OUTPATIENT)
Dept: PHYSICAL THERAPY | Age: 80
Setting detail: RECURRING SERIES
Discharge: HOME | End: 2025-05-31
Payer: MEDICARE

## 2025-05-28 NOTE — PROGRESS NOTES
Patient came into clinic - he had been struggling with vertigo the past few days, yesterday was bad he states, today better.   He stated, \"He was not feeling well, his stomach\"  We took his vitals, blood pressure, HR and O2 were good.  Patient decided to go ahead and go home since he was not feeling too well.  We will follow up with him at next visit.   Komal Bray PTA

## 2025-06-10 ENCOUNTER — CLINICAL DOCUMENTATION (OUTPATIENT)
Dept: ONCOLOGY | Age: 80
End: 2025-06-10

## 2025-06-10 NOTE — PROGRESS NOTES
[facility] Form [id number] Request for Services Form completed and faxed to [facility] at #[phone] along with the following medical records:    Hematology office visit progress note dated 10/18/24  Lab results from specimens collected 10/11/24     [facility] Transaction ID [id number]       Anxiety related to pending procedure

## 2025-06-24 NOTE — PROGRESS NOTES
Patient Name:  Darwin Bailey                               YOB: 1945  MRN: [id number]                                                Office Visit 6/26/2025    ASSESSMENT AND PLAN:  (Medical Decision Making)  Mr. Bailey is a really pleasant 77 yo gentleman with dyspnea which is severe, but no exertional hypoxemia.    1. Dyspnea on exertion  Continue emphysema medications, and recommend trial of nebulizer treatment pre-exercise to see if makes an impact on the dyspnea.  Continue spiriva and wixela, adjusting times to twice daily use morning and night with spiriva in am.  Will check cpfts with MIP/MEP to assess for muscular weakness like is seen in diaphragmatic weakness.    He did a stair climb and ambulating oximetry today and his sat never dropped, which was surprising because he was clearly winded.    2. Centrilobular emphysema (HCC)  [x]  Smoking cessation-quit  [x]  Pneumococcal vaccination utd.  [x]  Supplemental oxygen and its benefits for those with hypoxemia were discussed.   [x]  Alpha-1 antitrypsin testing was recommended and was performed.  [x]  Pulmonary rehab and its benefits improving exercise capacity were discussed. Completed.  [x]  Bronchodilator therapy- Educated the patient about the role of  bronchodilators for long-term improvement in symptoms, exercise capacity and airflow limitation.  The specific uses of short-term & long-term bronchodilators were reviewed.    - albuterol sulfate HFA (PROAIR HFA) 108 (90 Base) MCG/ACT inhaler; Inhale 2 puffs into the lungs every 6 hours as needed for Wheezing  Dispense: 3 each; Refill: 3  - fluticasone-salmeterol (WIXELA INHUB) 250-50 MCG/ACT AEPB diskus inhaler; Inhale 1 puff into the lungs in the morning and 1 puff in the evening.  Dispense: 3 each; Refill: 3  - tiotropium (SPIRIVA RESPIMAT) 2.5 MCG/ACT AERS inhaler; Inhale 2 puffs into the lungs daily  Dispense: 3 each; Refill: 3  - DME Order for Home Oxygen as OP  - Pulse

## 2025-06-25 ENCOUNTER — OFFICE VISIT (OUTPATIENT)
Dept: PULMONOLOGY | Age: 80
End: 2025-06-25
Payer: OTHER GOVERNMENT

## 2025-06-25 VITALS
RESPIRATION RATE: 17 BRPM | OXYGEN SATURATION: 98 % | HEART RATE: 71 BPM | WEIGHT: 180 LBS | BODY MASS INDEX: 25.2 KG/M2 | DIASTOLIC BLOOD PRESSURE: 76 MMHG | HEIGHT: 71 IN | SYSTOLIC BLOOD PRESSURE: 122 MMHG

## 2025-06-25 DIAGNOSIS — J43.2 CENTRILOBULAR EMPHYSEMA (HCC): ICD-10-CM

## 2025-06-25 DIAGNOSIS — R06.09 DYSPNEA ON EXERTION: Primary | ICD-10-CM

## 2025-06-25 PROCEDURE — 99214 OFFICE O/P EST MOD 30 MIN: CPT | Performed by: INTERNAL MEDICINE

## 2025-06-25 PROCEDURE — 1159F MED LIST DOCD IN RCRD: CPT | Performed by: INTERNAL MEDICINE

## 2025-06-25 PROCEDURE — 1160F RVW MEDS BY RX/DR IN RCRD: CPT | Performed by: INTERNAL MEDICINE

## 2025-06-25 PROCEDURE — 1123F ACP DISCUSS/DSCN MKR DOCD: CPT | Performed by: INTERNAL MEDICINE

## 2025-06-25 RX ORDER — FLUTICASONE PROPIONATE AND SALMETEROL 250; 50 UG/1; UG/1
1 POWDER RESPIRATORY (INHALATION) EVERY 12 HOURS
Qty: 3 EACH | Refills: 3 | Status: SHIPPED | OUTPATIENT
Start: 2025-06-25

## 2025-06-25 RX ORDER — ALBUTEROL SULFATE 90 UG/1
2 INHALANT RESPIRATORY (INHALATION) EVERY 6 HOURS PRN
Qty: 3 EACH | Refills: 3 | Status: SHIPPED | OUTPATIENT
Start: 2025-06-25

## 2025-07-09 ENCOUNTER — OFFICE VISIT (OUTPATIENT)
Dept: ORTHOPEDIC SURGERY | Age: 80
End: 2025-07-09
Payer: MEDICARE

## 2025-07-09 VITALS — HEIGHT: 71 IN | BODY MASS INDEX: 25.2 KG/M2 | WEIGHT: 180 LBS

## 2025-07-09 DIAGNOSIS — M48.062 SPINAL STENOSIS OF LUMBAR REGION WITH NEUROGENIC CLAUDICATION: Primary | ICD-10-CM

## 2025-07-09 DIAGNOSIS — M54.16 LUMBAR RADICULOPATHY: ICD-10-CM

## 2025-07-09 PROCEDURE — G8417 CALC BMI ABV UP PARAM F/U: HCPCS | Performed by: PHYSICIAN ASSISTANT

## 2025-07-09 PROCEDURE — 1123F ACP DISCUSS/DSCN MKR DOCD: CPT | Performed by: PHYSICIAN ASSISTANT

## 2025-07-09 PROCEDURE — 99214 OFFICE O/P EST MOD 30 MIN: CPT | Performed by: PHYSICIAN ASSISTANT

## 2025-07-09 PROCEDURE — 1036F TOBACCO NON-USER: CPT | Performed by: PHYSICIAN ASSISTANT

## 2025-07-09 PROCEDURE — G8428 CUR MEDS NOT DOCUMENT: HCPCS | Performed by: PHYSICIAN ASSISTANT

## 2025-07-09 PROCEDURE — G2211 COMPLEX E/M VISIT ADD ON: HCPCS | Performed by: PHYSICIAN ASSISTANT

## 2025-07-09 NOTE — PROGRESS NOTES
Name: Darwin Bailey  YOB: 1945  Gender: male  MRN: [id number]         *ANNUAL VISIT *        CC:   Chief Complaint   Patient presents with    Follow-up     Lumbar spine          HPI:   History of Present Illness  The patient is a 79-year-old male, an established patient, who presents for evaluation of lower back pain.    His lower back pain has been managed with a home exercise program and lumbar injection therapy. He last underwent a right L4 selective nerve root block with Dr. Orr on 03/24/2025. He also saw Dr. Gonzalez a few months ago and underwent kyphoplasty for a fracture at T7, which provided immediate relief. He reports that his back and leg are in good condition following the procedure. He believes another injection in his lower back may be beneficial because he is beginning to have right sided lower back pain in particular, worse with standing and walking.. He reports no new health changes over the past year. He is not experiencing any pain or numbness in his legs. He is currently on baby aspirin.    He has COPD, which has been gradually worsening. He uses an inhaler daily. His mobility is limited due to breathing difficulties, particularly when walking uphill.  And exercise in general                  Past Medical History Includes:   Past Medical History:   Diagnosis Date    Abdominal aneurysm without mention of rupture 9/30/2015    followed by Dr. Kearney, vascular 2/7/25 office note 4.1 cm AAA    Arthritis     Asthma     Benign localized hyperplasia of prostate without urinary obstruction and other lower urinary tract symptoms (LUTS) 9/30/2015    V.A. urologist    Bladder neck obstruction 9/30/2015    Cancer (HCC) 02/2017    melanoma, rt ear    Cigarette smoker     quit 2023    COPD (chronic obstructive pulmonary disease) ([facility])     inhalers    GERD (gastroesophageal reflux disease)     medication    H/O testicular mass 9/30/2015    V.A. Working up     HLD (hyperlipidemia)

## 2025-07-16 ENCOUNTER — CLINICAL SUPPORT (OUTPATIENT)
Age: 80
End: 2025-07-16
Payer: OTHER GOVERNMENT

## 2025-07-16 DIAGNOSIS — R06.09 DYSPNEA ON EXERTION: Primary | ICD-10-CM

## 2025-07-16 LAB
FEF25-27, POC: 0.84 L/S
FET, POC: NORMAL
FEV 1 , POC: 2.38 L
FEV1/FVC, POC: NORMAL
FVC, POC: NORMAL
LUNG AGE, POC: NORMAL
PEF, POC: 7.54 L/S

## 2025-07-16 PROCEDURE — 94729 DIFFUSING CAPACITY: CPT | Performed by: INTERNAL MEDICINE

## 2025-07-16 PROCEDURE — 94726 PLETHYSMOGRAPHY LUNG VOLUMES: CPT | Performed by: INTERNAL MEDICINE

## 2025-07-16 PROCEDURE — 94010 BREATHING CAPACITY TEST: CPT | Performed by: INTERNAL MEDICINE

## 2025-07-21 ENCOUNTER — OFFICE VISIT (OUTPATIENT)
Dept: ORTHOPEDIC SURGERY | Age: 80
End: 2025-07-21
Payer: MEDICARE

## 2025-07-21 DIAGNOSIS — M54.16 LUMBAR RADICULOPATHY: Primary | ICD-10-CM

## 2025-07-21 DIAGNOSIS — M48.062 SPINAL STENOSIS OF LUMBAR REGION WITH NEUROGENIC CLAUDICATION: ICD-10-CM

## 2025-07-21 DIAGNOSIS — M47.816 LUMBAR SPONDYLOSIS: ICD-10-CM

## 2025-07-21 PROCEDURE — 64483 NJX AA&/STRD TFRM EPI L/S 1: CPT | Performed by: PHYSICAL MEDICINE & REHABILITATION

## 2025-07-21 RX ORDER — TRIAMCINOLONE ACETONIDE 40 MG/ML
40 INJECTION, SUSPENSION INTRA-ARTICULAR; INTRAMUSCULAR ONCE
Status: COMPLETED | OUTPATIENT
Start: 2025-07-21 | End: 2025-07-21

## 2025-07-21 RX ADMIN — TRIAMCINOLONE ACETONIDE 40 MG: 40 INJECTION, SUSPENSION INTRA-ARTICULAR; INTRAMUSCULAR at 14:26

## 2025-07-21 NOTE — PROGRESS NOTES
Name: Darwin Bailey  YOB: 1945  Gender: male  MRN: [id number]        Transforaminal RELL Procedure Note    Procedure: Right  L4-L5 transforaminal epidural steroid injections     Precautions: Darwin Bailey denies prior sensitivity to steroid, local anesthetic, iodine, or shellfish.       Consent:  Consent was obtained prior to the procedure. The procedure was discussed at length with Darwin Bailey. He was given the opportunity to ask questions regarding the procedure and its associated risks.  In addition to the potential risks associated with the procedure itself, the patient was informed both verbally and in writing of potential side effects of the use glucocorticoids.  The patient appeared to comprehend the informed consent and desired to have the procedure performed, and informed consent was signed.     He was placed in a prone position on the fluoroscopy table and the skin was prepped and draped in a routine sterile fashion. The areas to be injected were each anesthetized with 1 ml of 1% Lidocaine. A 22 gauge 3.5 inch spinal needle was carefully advanced under fluoroscopic guidance to the right L4-L5 transforaminal space  0.5 ml of 70% of Omnipaque was injected to confirm proper needle placement and absence of subdural or vascular flow Once proper placement was confirmed, 0.5ml of sterile water and 40 mg kenalog were injected through the spinal needle.     Fluoroscopic guidance was used intermittently over a 10-minute period to insure proper needle placement and his safety. A hard copy of the fluoroscopic image has been placed in his chart and is saved on the C-arm hard drive. He was monitored after the procedure and discharged home in a stable fashion with a routine follow up.    Procedural Diagnosis:     ICD-10-CM    1. Lumbar radiculopathy  M54.16 FL NERVE BLOCK LUMBOSACRAL 1ST     triamcinolone acetonide (KENALOG-40) injection 40 mg      2. Spinal stenosis of lumbar

## 2025-07-31 ENCOUNTER — OFFICE VISIT (OUTPATIENT)
Dept: INTERNAL MEDICINE CLINIC | Facility: CLINIC | Age: 80
End: 2025-07-31

## 2025-07-31 VITALS
BODY MASS INDEX: 24.89 KG/M2 | WEIGHT: 177.8 LBS | SYSTOLIC BLOOD PRESSURE: 116 MMHG | HEIGHT: 71 IN | DIASTOLIC BLOOD PRESSURE: 74 MMHG

## 2025-07-31 DIAGNOSIS — R53.1 WEAKNESS: ICD-10-CM

## 2025-07-31 DIAGNOSIS — E53.8 B12 DEFICIENCY: ICD-10-CM

## 2025-07-31 DIAGNOSIS — E78.00 PURE HYPERCHOLESTEROLEMIA: ICD-10-CM

## 2025-07-31 DIAGNOSIS — R26.81 GAIT INSTABILITY: ICD-10-CM

## 2025-07-31 DIAGNOSIS — Z85.828 HISTORY OF BASAL CELL CARCINOMA (BCC): ICD-10-CM

## 2025-07-31 DIAGNOSIS — N18.32 STAGE 3B CHRONIC KIDNEY DISEASE (HCC): ICD-10-CM

## 2025-07-31 DIAGNOSIS — Z00.00 MEDICARE ANNUAL WELLNESS VISIT, SUBSEQUENT: Primary | ICD-10-CM

## 2025-07-31 DIAGNOSIS — J44.9 CHRONIC OBSTRUCTIVE PULMONARY DISEASE, UNSPECIFIED COPD TYPE (HCC): ICD-10-CM

## 2025-07-31 DIAGNOSIS — Z85.89 HISTORY OF SQUAMOUS CELL CARCINOMA: ICD-10-CM

## 2025-07-31 DIAGNOSIS — S22.060S CLOSED WEDGE COMPRESSION FRACTURE OF T7 VERTEBRA, SEQUELA: ICD-10-CM

## 2025-07-31 DIAGNOSIS — Z85.820 HISTORY OF MELANOMA: ICD-10-CM

## 2025-07-31 LAB
ALBUMIN SERPL-MCNC: 4.1 G/DL (ref 3.2–4.6)
ALBUMIN/GLOB SERPL: 1 (ref 1–1.9)
ALP SERPL-CCNC: 77 U/L (ref 40–129)
ALT SERPL-CCNC: 21 U/L (ref 8–55)
ANION GAP SERPL CALC-SCNC: 14 MMOL/L (ref 7–16)
AST SERPL-CCNC: 23 U/L (ref 15–37)
BASOPHILS # BLD: 0 K/UL (ref 0–0.2)
BASOPHILS NFR BLD: 0 % (ref 0–2)
BILIRUB SERPL-MCNC: 0.9 MG/DL (ref 0–1.2)
BUN SERPL-MCNC: 21 MG/DL (ref 8–23)
CALCIUM SERPL-MCNC: 9.8 MG/DL (ref 8.8–10.2)
CHLORIDE SERPL-SCNC: 100 MMOL/L (ref 98–107)
CHOLEST SERPL-MCNC: 173 MG/DL (ref 0–200)
CO2 SERPL-SCNC: 22 MMOL/L (ref 20–29)
CREAT SERPL-MCNC: 1.45 MG/DL (ref 0.8–1.3)
DIFFERENTIAL METHOD BLD: ABNORMAL
EOSINOPHIL # BLD: 0.02 K/UL (ref 0–0.8)
EOSINOPHIL NFR BLD: 0.4 % (ref 0.5–7.8)
ERYTHROCYTE [DISTWIDTH] IN BLOOD BY AUTOMATED COUNT: 17.4 % (ref 11.9–14.6)
GLOBULIN SER CALC-MCNC: 4 G/DL (ref 2.3–3.5)
GLUCOSE SERPL-MCNC: 103 MG/DL (ref 70–99)
HCT VFR BLD AUTO: 37.9 % (ref 41.1–50.3)
HDLC SERPL-MCNC: 73 MG/DL (ref 40–60)
HDLC SERPL: 2.4 (ref 0–5)
HGB BLD-MCNC: 12.3 G/DL (ref 13.6–17.2)
IMM GRANULOCYTES # BLD AUTO: 0.04 K/UL (ref 0–0.5)
IMM GRANULOCYTES NFR BLD AUTO: 0.8 % (ref 0–5)
LDLC SERPL CALC-MCNC: 81 MG/DL (ref 0–100)
LYMPHOCYTES # BLD: 1.87 K/UL (ref 0.5–4.6)
LYMPHOCYTES NFR BLD: 37.3 % (ref 13–44)
MCH RBC QN AUTO: 32.5 PG (ref 26.1–32.9)
MCHC RBC AUTO-ENTMCNC: 32.5 G/DL (ref 31.4–35)
MCV RBC AUTO: 100.3 FL (ref 82–102)
MONOCYTES # BLD: 0.42 K/UL (ref 0.1–1.3)
MONOCYTES NFR BLD: 8.4 % (ref 4–12)
NEUTS SEG # BLD: 2.67 K/UL (ref 1.7–8.2)
NEUTS SEG NFR BLD: 53.1 % (ref 43–78)
NRBC # BLD: 0 K/UL (ref 0–0.2)
PLATELET # BLD AUTO: 192 K/UL (ref 150–450)
PMV BLD AUTO: 10.7 FL (ref 9.4–12.3)
POTASSIUM SERPL-SCNC: 4.7 MMOL/L (ref 3.5–5.1)
PROT SERPL-MCNC: 8.1 G/DL (ref 6.3–8.2)
RBC # BLD AUTO: 3.78 M/UL (ref 4.23–5.6)
SODIUM SERPL-SCNC: 135 MMOL/L (ref 136–145)
TRIGL SERPL-MCNC: 97 MG/DL (ref 0–150)
VIT B12 SERPL-MCNC: 526 PG/ML (ref 193–986)
VLDLC SERPL CALC-MCNC: 19 MG/DL (ref 6–23)
WBC # BLD AUTO: 5 K/UL (ref 4.3–11.1)

## 2025-07-31 ASSESSMENT — ENCOUNTER SYMPTOMS
CONSTIPATION: 0
EYE REDNESS: 0
COUGH: 0
SHORTNESS OF BREATH: 1
EYE PAIN: 0
NAUSEA: 0
BLOOD IN STOOL: 0
VOICE CHANGE: 0
DIARRHEA: 0
COLOR CHANGE: 0
ABDOMINAL PAIN: 0

## 2025-07-31 ASSESSMENT — PATIENT HEALTH QUESTIONNAIRE - PHQ9
SUM OF ALL RESPONSES TO PHQ QUESTIONS 1-9: 1
2. FEELING DOWN, DEPRESSED OR HOPELESS: NOT AT ALL
1. LITTLE INTEREST OR PLEASURE IN DOING THINGS: SEVERAL DAYS
SUM OF ALL RESPONSES TO PHQ QUESTIONS 1-9: 1

## 2025-07-31 ASSESSMENT — LIFESTYLE VARIABLES
HOW OFTEN DURING THE LAST YEAR HAVE YOU NEEDED AN ALCOHOLIC DRINK FIRST THING IN THE MORNING TO GET YOURSELF GOING AFTER A NIGHT OF HEAVY DRINKING: NEVER
HOW OFTEN DO YOU HAVE A DRINK CONTAINING ALCOHOL: 4 OR MORE TIMES A WEEK
HOW MANY STANDARD DRINKS CONTAINING ALCOHOL DO YOU HAVE ON A TYPICAL DAY: 1 OR 2
HOW OFTEN DURING THE LAST YEAR HAVE YOU BEEN UNABLE TO REMEMBER WHAT HAPPENED THE NIGHT BEFORE BECAUSE YOU HAD BEEN DRINKING: NEVER
HAVE YOU OR SOMEONE ELSE BEEN INJURED AS A RESULT OF YOUR DRINKING: NO
HOW OFTEN DURING THE LAST YEAR HAVE YOU HAD A FEELING OF GUILT OR REMORSE AFTER DRINKING: NEVER
HOW OFTEN DURING THE LAST YEAR HAVE YOU FOUND THAT YOU WERE NOT ABLE TO STOP DRINKING ONCE YOU HAD STARTED: NEVER
HOW OFTEN DURING THE LAST YEAR HAVE YOU FAILED TO DO WHAT WAS NORMALLY EXPECTED FROM YOU BECAUSE OF DRINKING: NEVER
HAS A RELATIVE, FRIEND, DOCTOR, OR ANOTHER HEALTH PROFESSIONAL EXPRESSED CONCERN ABOUT YOUR DRINKING OR SUGGESTED YOU CUT DOWN: NO

## 2025-07-31 NOTE — PROGRESS NOTES
PROGRESS NOTE      Chief Complaint   Patient presents with    Medicare AWV       Assessment & Plan  Health maintenance:  - Due for Tdap vaccine, last recorded dose in 2013  - RSV vaccine recommended given age over 75 years  - Hemoglobin levels within normal range  - Fasting blood work ordered today to assess cholesterol levels, metabolic panel, kidney function, B12 level, and hemoglobin    Dizziness and lightheadedness:  - Reports dizziness and lightheadedness, leading to falls after taking approximately four steps  - No falls since starting midodrine prescribed by cardiology  - Mentions slight weakness in legs and arms  - Referral for physical therapy to address balance issues and leg weakness    Lower back pain:  - Recently had an epidural injection for lower back pain by Dr. Orr on 07/21/2025  - Recalls ER visit for back pain, x-ray or CT scan showed no issues  - MRI scheduled by Dr. Orr's PA showed a compression fracture with 35% height loss  - T7 compression fracture managed by Dr. Gonzalez    Chronic obstructive pulmonary disease (COPD):  - Pulmonary function test on 07/16/2025 showed slight improvement from previous year  - Follow-up with Dr. Mckeon in 2 to 3 weeks  - Uses Wixela 250/50, Spiriva, and albuterol as needed; home oxygen overnight but inconsistent due to no dyspnea during sleep  - Experiences dyspnea easily and quickly, limiting ability to play golf; referred to cardiology, heart found to be in good condition for age    Vitamin B12 deficiency:  - Currently seeing Dr. Richardson and Dr. Terrazas, prescribed B12 supplements due to low levels  - Has not had B12 levels tested since starting supplement  - Follow-up with Dr. Richardson next month and with Dr. Terrazas at end of October 2025    History of melanoma, squamous cell carcinoma, and basal cell carcinoma:  - History of melanoma, squamous cell carcinoma, and basal cell carcinoma; malignant basal cell carcinoma removed last year  - Sees dermatology

## 2025-08-01 DIAGNOSIS — R77.1 ELEVATED SERUM GLOBULIN LEVEL: ICD-10-CM

## 2025-08-05 LAB
Lab: NORMAL
Lab: NORMAL
REFERENCE LAB: NORMAL

## 2025-08-08 ENCOUNTER — OFFICE VISIT (OUTPATIENT)
Dept: VASCULAR SURGERY | Age: 80
End: 2025-08-08
Payer: MEDICARE

## 2025-08-08 VITALS
OXYGEN SATURATION: 94 % | HEIGHT: 71 IN | WEIGHT: 178 LBS | BODY MASS INDEX: 24.92 KG/M2 | SYSTOLIC BLOOD PRESSURE: 123 MMHG | DIASTOLIC BLOOD PRESSURE: 81 MMHG | HEART RATE: 72 BPM

## 2025-08-08 DIAGNOSIS — I73.9 PVD (PERIPHERAL VASCULAR DISEASE) WITH CLAUDICATION: Primary | ICD-10-CM

## 2025-08-08 PROCEDURE — 1036F TOBACCO NON-USER: CPT | Performed by: SURGERY

## 2025-08-08 PROCEDURE — G8428 CUR MEDS NOT DOCUMENT: HCPCS | Performed by: SURGERY

## 2025-08-08 PROCEDURE — 1123F ACP DISCUSS/DSCN MKR DOCD: CPT | Performed by: SURGERY

## 2025-08-08 PROCEDURE — 99213 OFFICE O/P EST LOW 20 MIN: CPT | Performed by: SURGERY

## 2025-08-08 PROCEDURE — G8420 CALC BMI NORM PARAMETERS: HCPCS | Performed by: SURGERY

## 2025-08-12 ENCOUNTER — HOSPITAL ENCOUNTER (OUTPATIENT)
Dept: PHYSICAL THERAPY | Age: 80
Setting detail: RECURRING SERIES
Discharge: HOME | End: 2025-08-15
Payer: MEDICARE

## 2025-08-12 DIAGNOSIS — R26.89 OTHER ABNORMALITIES OF GAIT AND MOBILITY: Primary | ICD-10-CM

## 2025-08-12 DIAGNOSIS — M62.81 MUSCLE WEAKNESS (GENERALIZED): ICD-10-CM

## 2025-08-12 PROCEDURE — 97162 PT EVAL MOD COMPLEX 30 MIN: CPT

## 2025-08-12 PROCEDURE — 97110 THERAPEUTIC EXERCISES: CPT

## 2025-08-12 ASSESSMENT — PAIN SCALES - GENERAL: PAINLEVEL_OUTOF10: 0

## (undated) DEVICE — DRAPE C ARM W/ POLY STRP W42XL72IN FOR MOB XR

## (undated) DEVICE — GLOVE SURG SZ 75 L12IN FNGR THK79MIL GRN LTX FREE

## (undated) DEVICE — PAD,NON-ADHERENT,3X8,STERILE,LF,1/PK: Brand: MEDLINE

## (undated) DEVICE — SUTURE MONOCRYL SZ 3-0 L18IN ABSRB UD L19MM PS-2 3/8 CIR PRIM Y497G

## (undated) DEVICE — STERILE PACKED. BORE DIAMETER 1.6 MM; ANGLE OF INSERTION 19° TO THE LONG AXIS OF THE TRANSDUCER: Brand: SINGLE-USE BIPLANE BIOPSY GUIDE

## (undated) DEVICE — JACKSON TABLE POSITIONER KIT: Brand: MEDLINE INDUSTRIES, INC.

## (undated) DEVICE — HOLDER PRB URONAV

## (undated) DEVICE — INTENDED FOR TISSUE SEPARATION, AND OTHER PROCEDURES THAT REQUIRE A SHARP SURGICAL BLADE TO PUNCTURE OR CUT.: Brand: BARD-PARKER ® STAINLESS STEEL BLADES

## (undated) DEVICE — 3M™ TEGADERM™ TRANSPARENT FILM DRESSING FRAME STYLE, 1624W, 2-3/8 IN X 2-3/4 IN (6 CM X 7 CM), 100/CT 4CT/CASE: Brand: 3M™ TEGADERM™

## (undated) DEVICE — PREMIUM WET SKIN PREP TRAY: Brand: MEDLINE INDUSTRIES, INC.

## (undated) DEVICE — SHEET,DRAPE,53X77,STERILE: Brand: MEDLINE

## (undated) DEVICE — Device

## (undated) DEVICE — MULTI-TAP ADAPTER A22 SIZE 2 AAC: Brand: KYPHON® MULTI-TAP™ ADAPTER

## (undated) DEVICE — MAX-CORE® DISPOSABLE CORE BIOPSY INSTRUMENT, 18G X 25CM: Brand: MAX-CORE

## (undated) DEVICE — 3M™ IOBAN™ 2 ANTIMICROBIAL INCISE DRAPE 6650EZ: Brand: IOBAN™ 2

## (undated) DEVICE — BONE TAMP KIT KEX152EB FF E2 15/2 OI: Brand: KYPHON EXPRESS II KYPHOPAK TRAY

## (undated) DEVICE — SUTURE MONOCRYL N ABSRB MONOFILAMENT 3-0 PS-2 18 IN NDL MONOCRYL PLUS

## (undated) DEVICE — APPLICATOR MEDICATED 26 CC SOLUTION HI LT ORNG CHLORAPREP

## (undated) DEVICE — LIQUIBAND RAPID ADHESIVE 36/CS 0.8ML: Brand: MEDLINE

## (undated) DEVICE — SHEET, T, LAPAROTOMY, STERILE: Brand: MEDLINE

## (undated) DEVICE — BONE CEMENT CX01B KYPHON XPEDE W MXR US: Brand: KYPHON® XPEDE™ BONE CEMENT AND KYPHON® MIXER PACK

## (undated) DEVICE — DRAPE TWL SURG 16X26IN BLU ORB04] ALLCARE INC]

## (undated) DEVICE — COVER PRB W1XL11.8IN ENDOCAVITY CLR E BND NEOGUARD